# Patient Record
Sex: MALE | Race: BLACK OR AFRICAN AMERICAN | Employment: FULL TIME | URBAN - METROPOLITAN AREA
[De-identification: names, ages, dates, MRNs, and addresses within clinical notes are randomized per-mention and may not be internally consistent; named-entity substitution may affect disease eponyms.]

---

## 2017-02-03 RX ORDER — DILTIAZEM HYDROCHLORIDE 180 MG/1
CAPSULE, COATED, EXTENDED RELEASE ORAL
Qty: 90 CAP | Refills: 1 | Status: SHIPPED | OUTPATIENT
Start: 2017-02-03 | End: 2017-02-08 | Stop reason: SDUPTHER

## 2017-02-08 ENCOUNTER — TELEPHONE (OUTPATIENT)
Dept: INTERNAL MEDICINE CLINIC | Age: 67
End: 2017-02-08

## 2017-02-08 ENCOUNTER — HOSPITAL ENCOUNTER (INPATIENT)
Age: 67
LOS: 2 days | Discharge: HOME | DRG: 378 | End: 2017-02-11
Attending: EMERGENCY MEDICINE | Admitting: HOSPITALIST
Payer: COMMERCIAL

## 2017-02-08 DIAGNOSIS — K92.2 UPPER GI BLEED: Primary | ICD-10-CM

## 2017-02-08 DIAGNOSIS — K92.2 LOWER GI BLEED: ICD-10-CM

## 2017-02-08 LAB
ALBUMIN SERPL BCP-MCNC: 3.7 G/DL (ref 3.5–5)
ALBUMIN/GLOB SERPL: 0.9 {RATIO} (ref 1.1–2.2)
ALP SERPL-CCNC: 43 U/L (ref 45–117)
ALT SERPL-CCNC: 61 U/L (ref 12–78)
ANION GAP BLD CALC-SCNC: 6 MMOL/L (ref 5–15)
APTT PPP: 28.1 SEC (ref 22.1–32.5)
AST SERPL W P-5'-P-CCNC: 72 U/L (ref 15–37)
ATRIAL RATE: 112 BPM
BASOPHILS # BLD AUTO: 0 K/UL (ref 0–0.1)
BASOPHILS # BLD: 0 % (ref 0–1)
BILIRUB SERPL-MCNC: 0.5 MG/DL (ref 0.2–1)
BUN SERPL-MCNC: 26 MG/DL (ref 6–20)
BUN/CREAT SERPL: 29 (ref 12–20)
CALCIUM SERPL-MCNC: 9.1 MG/DL (ref 8.5–10.1)
CALCULATED P AXIS, ECG09: 49 DEGREES
CALCULATED R AXIS, ECG10: -21 DEGREES
CALCULATED T AXIS, ECG11: 35 DEGREES
CHLORIDE SERPL-SCNC: 102 MMOL/L (ref 97–108)
CO2 SERPL-SCNC: 30 MMOL/L (ref 21–32)
CREAT SERPL-MCNC: 0.91 MG/DL (ref 0.7–1.3)
DIAGNOSIS, 93000: NORMAL
EOSINOPHIL # BLD: 0.2 K/UL (ref 0–0.4)
EOSINOPHIL NFR BLD: 3 % (ref 0–7)
ERYTHROCYTE [DISTWIDTH] IN BLOOD BY AUTOMATED COUNT: 13 % (ref 11.5–14.5)
GLOBULIN SER CALC-MCNC: 4.2 G/DL (ref 2–4)
GLUCOSE BLD STRIP.AUTO-MCNC: 99 MG/DL (ref 65–100)
GLUCOSE SERPL-MCNC: 90 MG/DL (ref 65–100)
HCT VFR BLD AUTO: 39.9 % (ref 36.6–50.3)
HEMOCCULT STL QL: POSITIVE
HGB BLD-MCNC: 10.8 G/DL (ref 12.1–17)
HGB BLD-MCNC: 13.8 G/DL (ref 12.1–17)
HGB BLD-MCNC: 9.9 G/DL (ref 12.1–17)
INR PPP: 1.1 (ref 0.9–1.1)
LYMPHOCYTES # BLD AUTO: 27 % (ref 12–49)
LYMPHOCYTES # BLD: 1.3 K/UL (ref 0.8–3.5)
MCH RBC QN AUTO: 33.8 PG (ref 26–34)
MCHC RBC AUTO-ENTMCNC: 34.6 G/DL (ref 30–36.5)
MCV RBC AUTO: 97.8 FL (ref 80–99)
MONOCYTES # BLD: 0.6 K/UL (ref 0–1)
MONOCYTES NFR BLD AUTO: 13 % (ref 5–13)
NEUTS SEG # BLD: 2.8 K/UL (ref 1.8–8)
NEUTS SEG NFR BLD AUTO: 57 % (ref 32–75)
P-R INTERVAL, ECG05: 196 MS
PLATELET # BLD AUTO: 189 K/UL (ref 150–400)
POTASSIUM SERPL-SCNC: 4.2 MMOL/L (ref 3.5–5.1)
PROT SERPL-MCNC: 7.9 G/DL (ref 6.4–8.2)
PROTHROMBIN TIME: 11 SEC (ref 9–11.1)
Q-T INTERVAL, ECG07: 306 MS
QRS DURATION, ECG06: 82 MS
QTC CALCULATION (BEZET), ECG08: 417 MS
RBC # BLD AUTO: 4.08 M/UL (ref 4.1–5.7)
SERVICE CMNT-IMP: NORMAL
SODIUM SERPL-SCNC: 138 MMOL/L (ref 136–145)
THERAPEUTIC RANGE,PTTT: NORMAL SECS (ref 58–77)
TROPONIN I SERPL-MCNC: <0.04 NG/ML
VENTRICULAR RATE, ECG03: 112 BPM
WBC # BLD AUTO: 5 K/UL (ref 4.1–11.1)

## 2017-02-08 PROCEDURE — 85025 COMPLETE CBC W/AUTO DIFF WBC: CPT | Performed by: EMERGENCY MEDICINE

## 2017-02-08 PROCEDURE — 94762 N-INVAS EAR/PLS OXIMTRY CONT: CPT

## 2017-02-08 PROCEDURE — C9113 INJ PANTOPRAZOLE SODIUM, VIA: HCPCS | Performed by: EMERGENCY MEDICINE

## 2017-02-08 PROCEDURE — 74011250637 HC RX REV CODE- 250/637: Performed by: HOSPITALIST

## 2017-02-08 PROCEDURE — 74011000250 HC RX REV CODE- 250: Performed by: HOSPITALIST

## 2017-02-08 PROCEDURE — 74011250636 HC RX REV CODE- 250/636: Performed by: INTERNAL MEDICINE

## 2017-02-08 PROCEDURE — 74011250636 HC RX REV CODE- 250/636: Performed by: HOSPITALIST

## 2017-02-08 PROCEDURE — 96366 THER/PROPH/DIAG IV INF ADDON: CPT

## 2017-02-08 PROCEDURE — 99218 HC RM OBSERVATION: CPT

## 2017-02-08 PROCEDURE — 3E0G8GC INTRODUCTION OF OTHER THERAPEUTIC SUBSTANCE INTO UPPER GI, VIA NATURAL OR ARTIFICIAL OPENING ENDOSCOPIC: ICD-10-PCS | Performed by: INTERNAL MEDICINE

## 2017-02-08 PROCEDURE — 74011000258 HC RX REV CODE- 258: Performed by: EMERGENCY MEDICINE

## 2017-02-08 PROCEDURE — 96365 THER/PROPH/DIAG IV INF INIT: CPT

## 2017-02-08 PROCEDURE — 80053 COMPREHEN METABOLIC PANEL: CPT | Performed by: EMERGENCY MEDICINE

## 2017-02-08 PROCEDURE — 84484 ASSAY OF TROPONIN QUANT: CPT | Performed by: EMERGENCY MEDICINE

## 2017-02-08 PROCEDURE — 93005 ELECTROCARDIOGRAM TRACING: CPT

## 2017-02-08 PROCEDURE — 85610 PROTHROMBIN TIME: CPT | Performed by: EMERGENCY MEDICINE

## 2017-02-08 PROCEDURE — 74011000250 HC RX REV CODE- 250: Performed by: EMERGENCY MEDICINE

## 2017-02-08 PROCEDURE — 77030010936 HC CLP LIG BSC -C: Performed by: INTERNAL MEDICINE

## 2017-02-08 PROCEDURE — 82270 OCCULT BLOOD FECES: CPT

## 2017-02-08 PROCEDURE — 85018 HEMOGLOBIN: CPT | Performed by: HOSPITALIST

## 2017-02-08 PROCEDURE — 0W3P8ZZ CONTROL BLEEDING IN GASTROINTESTINAL TRACT, VIA NATURAL OR ARTIFICIAL OPENING ENDOSCOPIC: ICD-10-PCS | Performed by: INTERNAL MEDICINE

## 2017-02-08 PROCEDURE — 77030020268 HC MISC GENERAL SUPPLY: Performed by: INTERNAL MEDICINE

## 2017-02-08 PROCEDURE — 76040000019: Performed by: INTERNAL MEDICINE

## 2017-02-08 PROCEDURE — 96374 THER/PROPH/DIAG INJ IV PUSH: CPT

## 2017-02-08 PROCEDURE — 74011250636 HC RX REV CODE- 250/636: Performed by: EMERGENCY MEDICINE

## 2017-02-08 PROCEDURE — 99285 EMERGENCY DEPT VISIT HI MDM: CPT

## 2017-02-08 PROCEDURE — 82962 GLUCOSE BLOOD TEST: CPT

## 2017-02-08 PROCEDURE — 96361 HYDRATE IV INFUSION ADD-ON: CPT

## 2017-02-08 PROCEDURE — 77030003657 HC NDL SCLER BSC -B: Performed by: INTERNAL MEDICINE

## 2017-02-08 PROCEDURE — 36415 COLL VENOUS BLD VENIPUNCTURE: CPT | Performed by: HOSPITALIST

## 2017-02-08 PROCEDURE — 74011000258 HC RX REV CODE- 258: Performed by: HOSPITALIST

## 2017-02-08 PROCEDURE — 0DC78ZZ EXTIRPATION OF MATTER FROM STOMACH, PYLORUS, VIA NATURAL OR ARTIFICIAL OPENING ENDOSCOPIC: ICD-10-PCS | Performed by: INTERNAL MEDICINE

## 2017-02-08 PROCEDURE — 74011250636 HC RX REV CODE- 250/636: Performed by: NURSE PRACTITIONER

## 2017-02-08 PROCEDURE — 85730 THROMBOPLASTIN TIME PARTIAL: CPT | Performed by: EMERGENCY MEDICINE

## 2017-02-08 RX ORDER — SODIUM CHLORIDE 0.9 % (FLUSH) 0.9 %
5-10 SYRINGE (ML) INJECTION EVERY 8 HOURS
Status: ACTIVE | OUTPATIENT
Start: 2017-02-08 | End: 2017-02-08

## 2017-02-08 RX ORDER — LORAZEPAM 2 MG/ML
2 INJECTION INTRAMUSCULAR
Status: DISCONTINUED | OUTPATIENT
Start: 2017-02-08 | End: 2017-02-11 | Stop reason: HOSPADM

## 2017-02-08 RX ORDER — ERGOCALCIFEROL 1.25 MG/1
50000 CAPSULE ORAL
Status: CANCELLED | OUTPATIENT
Start: 2017-02-08

## 2017-02-08 RX ORDER — SODIUM CHLORIDE 0.9 % (FLUSH) 0.9 %
5-10 SYRINGE (ML) INJECTION AS NEEDED
Status: ACTIVE | OUTPATIENT
Start: 2017-02-08 | End: 2017-02-08

## 2017-02-08 RX ORDER — ONDANSETRON 2 MG/ML
4 INJECTION INTRAMUSCULAR; INTRAVENOUS
Status: DISCONTINUED | OUTPATIENT
Start: 2017-02-08 | End: 2017-02-11 | Stop reason: HOSPADM

## 2017-02-08 RX ORDER — ATROPINE SULFATE 0.1 MG/ML
0.5 INJECTION INTRAVENOUS
Status: DISCONTINUED | OUTPATIENT
Start: 2017-02-08 | End: 2017-02-08 | Stop reason: HOSPADM

## 2017-02-08 RX ORDER — MIDAZOLAM HYDROCHLORIDE 1 MG/ML
.25-1 INJECTION, SOLUTION INTRAMUSCULAR; INTRAVENOUS
Status: DISCONTINUED | OUTPATIENT
Start: 2017-02-08 | End: 2017-02-08 | Stop reason: HOSPADM

## 2017-02-08 RX ORDER — BUPRENORPHINE HYDROCHLORIDE AND NALOXONE HYDROCHLORIDE DIHYDRATE 8; 2 MG/1; MG/1
1.5 TABLET SUBLINGUAL DAILY
Status: DISCONTINUED | OUTPATIENT
Start: 2017-02-08 | End: 2017-02-11 | Stop reason: HOSPADM

## 2017-02-08 RX ORDER — SODIUM CHLORIDE 0.9 % (FLUSH) 0.9 %
5-10 SYRINGE (ML) INJECTION AS NEEDED
Status: DISCONTINUED | OUTPATIENT
Start: 2017-02-08 | End: 2017-02-11 | Stop reason: HOSPADM

## 2017-02-08 RX ORDER — SODIUM CHLORIDE 0.9 % (FLUSH) 0.9 %
5-10 SYRINGE (ML) INJECTION EVERY 8 HOURS
Status: DISCONTINUED | OUTPATIENT
Start: 2017-02-08 | End: 2017-02-08

## 2017-02-08 RX ORDER — SODIUM CHLORIDE 9 MG/ML
50 INJECTION, SOLUTION INTRAVENOUS CONTINUOUS
Status: DISPENSED | OUTPATIENT
Start: 2017-02-08 | End: 2017-02-08

## 2017-02-08 RX ORDER — DEXTROMETHORPHAN/PSEUDOEPHED 2.5-7.5/.8
1.2 DROPS ORAL
Status: DISCONTINUED | OUTPATIENT
Start: 2017-02-08 | End: 2017-02-08 | Stop reason: HOSPADM

## 2017-02-08 RX ORDER — FLUTICASONE PROPIONATE 50 MCG
2 SPRAY, SUSPENSION (ML) NASAL
Status: DISCONTINUED | OUTPATIENT
Start: 2017-02-08 | End: 2017-02-11 | Stop reason: HOSPADM

## 2017-02-08 RX ORDER — EPINEPHRINE 0.1 MG/ML
1 INJECTION INTRACARDIAC; INTRAVENOUS
Status: DISCONTINUED | OUTPATIENT
Start: 2017-02-08 | End: 2017-02-08 | Stop reason: HOSPADM

## 2017-02-08 RX ORDER — DILTIAZEM HYDROCHLORIDE 180 MG/1
180 CAPSULE, COATED, EXTENDED RELEASE ORAL DAILY
Status: DISCONTINUED | OUTPATIENT
Start: 2017-02-08 | End: 2017-02-11 | Stop reason: HOSPADM

## 2017-02-08 RX ORDER — SODIUM CHLORIDE 450 MG/100ML
75 INJECTION, SOLUTION INTRAVENOUS CONTINUOUS
Status: DISCONTINUED | OUTPATIENT
Start: 2017-02-08 | End: 2017-02-11

## 2017-02-08 RX ORDER — FENTANYL CITRATE 50 UG/ML
100 INJECTION, SOLUTION INTRAMUSCULAR; INTRAVENOUS
Status: DISCONTINUED | OUTPATIENT
Start: 2017-02-08 | End: 2017-02-08 | Stop reason: HOSPADM

## 2017-02-08 RX ORDER — FLUMAZENIL 0.1 MG/ML
0.2 INJECTION INTRAVENOUS
Status: DISCONTINUED | OUTPATIENT
Start: 2017-02-08 | End: 2017-02-08 | Stop reason: HOSPADM

## 2017-02-08 RX ORDER — FLUTICASONE PROPIONATE 50 MCG
2 SPRAY, SUSPENSION (ML) NASAL
COMMUNITY
End: 2019-03-18 | Stop reason: SDUPTHER

## 2017-02-08 RX ORDER — DIPHENHYDRAMINE HYDROCHLORIDE 50 MG/ML
50 INJECTION, SOLUTION INTRAMUSCULAR; INTRAVENOUS ONCE
Status: COMPLETED | OUTPATIENT
Start: 2017-02-08 | End: 2017-02-08

## 2017-02-08 RX ORDER — SODIUM CHLORIDE 0.9 % (FLUSH) 0.9 %
10 SYRINGE (ML) INJECTION EVERY 24 HOURS
Status: DISCONTINUED | OUTPATIENT
Start: 2017-02-08 | End: 2017-02-11 | Stop reason: HOSPADM

## 2017-02-08 RX ORDER — NALOXONE HYDROCHLORIDE 0.4 MG/ML
0.4 INJECTION, SOLUTION INTRAMUSCULAR; INTRAVENOUS; SUBCUTANEOUS
Status: DISCONTINUED | OUTPATIENT
Start: 2017-02-08 | End: 2017-02-08 | Stop reason: HOSPADM

## 2017-02-08 RX ADMIN — SODIUM CHLORIDE 8 MG/HR: 900 INJECTION, SOLUTION INTRAVENOUS at 15:18

## 2017-02-08 RX ADMIN — FOLIC ACID: 5 INJECTION, SOLUTION INTRAMUSCULAR; INTRAVENOUS; SUBCUTANEOUS at 13:56

## 2017-02-08 RX ADMIN — EPINEPHRINE 0.4 MG: 0.1 INJECTION INTRACARDIAC; INTRAVENOUS at 17:33

## 2017-02-08 RX ADMIN — SODIUM CHLORIDE 8 MG/HR: 900 INJECTION, SOLUTION INTRAVENOUS at 22:53

## 2017-02-08 RX ADMIN — MIDAZOLAM HYDROCHLORIDE 2 MG: 1 INJECTION, SOLUTION INTRAMUSCULAR; INTRAVENOUS at 17:21

## 2017-02-08 RX ADMIN — MIDAZOLAM HYDROCHLORIDE 1 MG: 1 INJECTION, SOLUTION INTRAMUSCULAR; INTRAVENOUS at 17:24

## 2017-02-08 RX ADMIN — BUPRENORPHINE AND NALOXONE 2 TABLET: 8; 2 TABLET SUBLINGUAL at 11:15

## 2017-02-08 RX ADMIN — ONDANSETRON 4 MG: 2 INJECTION INTRAMUSCULAR; INTRAVENOUS at 15:12

## 2017-02-08 RX ADMIN — SODIUM CHLORIDE 80 MG: 9 INJECTION, SOLUTION INTRAMUSCULAR; INTRAVENOUS; SUBCUTANEOUS at 06:04

## 2017-02-08 RX ADMIN — DILTIAZEM HYDROCHLORIDE 180 MG: 180 CAPSULE, COATED, EXTENDED RELEASE ORAL at 11:18

## 2017-02-08 RX ADMIN — Medication 10 ML: at 15:13

## 2017-02-08 RX ADMIN — SODIUM CHLORIDE 8 MG/HR: 900 INJECTION, SOLUTION INTRAVENOUS at 06:07

## 2017-02-08 RX ADMIN — SODIUM CHLORIDE 1000 ML: 900 INJECTION, SOLUTION INTRAVENOUS at 05:36

## 2017-02-08 RX ADMIN — FENTANYL CITRATE 50 MCG: 50 INJECTION, SOLUTION INTRAMUSCULAR; INTRAVENOUS at 17:21

## 2017-02-08 RX ADMIN — FENTANYL CITRATE 25 MCG: 50 INJECTION, SOLUTION INTRAMUSCULAR; INTRAVENOUS at 17:39

## 2017-02-08 RX ADMIN — MIDAZOLAM HYDROCHLORIDE 1 MG: 1 INJECTION, SOLUTION INTRAMUSCULAR; INTRAVENOUS at 18:34

## 2017-02-08 RX ADMIN — SODIUM CHLORIDE 75 ML/HR: 450 INJECTION, SOLUTION INTRAVENOUS at 10:39

## 2017-02-08 RX ADMIN — DIPHENHYDRAMINE HYDROCHLORIDE 50 MG: 50 INJECTION, SOLUTION INTRAMUSCULAR; INTRAVENOUS at 17:32

## 2017-02-08 RX ADMIN — FENTANYL CITRATE 25 MCG: 50 INJECTION, SOLUTION INTRAMUSCULAR; INTRAVENOUS at 17:25

## 2017-02-08 NOTE — H&P
[address]  174 [facility], [address] [place] [address]          Pre-procedure History and Physical       NAME:  Arturo Mccray. :   1950   MRN:   [id number]     CHIEF COMPLAINT/HPI: See procedure note    PMH:  Past Medical History   Diagnosis Date    Hepatitis C     Hepatitis C 2010    Hypertension     PUD (peptic ulcer disease)     Seizures ([address]      as a child-none since ~       PSH:  Past Surgical History   Procedure Laterality Date    Hx appendectomy      Hx heent       T&A    Hx gi       surgical repair of bleeding ulcer    Hx gi       colon resection    Hx orthopaedic   &      left knee    Hx knee arthroscopy       right knee       Allergies:  No Known Allergies    Home Medications:  Prior to Admission Medications   Prescriptions Last Dose Informant Patient Reported? Taking?   aspirin-acetaminophen-caffeine (EXCEDRIN ES) 250-250-65 mg per tablet   Yes Yes   Sig: Take 1-2 Tabs by mouth every eight (8) hours as needed for Headache.   buprenorphine-naloxone (SUBOXONE) 8-2 mg Subl 2017 at Unknown time  No Yes   Si Tabs by SubLINGual route daily. Patient taking differently: 1.5 Tabs by SubLINGual route daily. dilTIAZem CD (CARDIZEM CD) 180 mg ER capsule 2017 at Unknown time  No Yes   Sig: Take 1 Cap by mouth daily. fluticasone (FLONASE) 50 mcg/actuation nasal spray   Yes Yes   Si Sprays by Both Nostrils route daily as needed for Rhinitis or Allergies. Indications: ALLERGIC RHINITIS   multivitamin (ONE A DAY) tablet 2017 at Unknown time  Yes Yes   Sig: Take 1 Tab by mouth daily.       Facility-Administered Medications: None       Hospital Medications:  Current Facility-Administered Medications   Medication Dose Route Frequency    pantoprazole (PROTONIX) 40 mg in 0.9% sodium chloride (MBP/ADV) 50 mL  8 mg/hr IntraVENous CONTINUOUS    buprenorphine-naloxone (SUBOXONE) 8-2mg SL tablet  2 Tab SubLINGual DAILY    fluticasone (FLONASE) 50 mcg/actuation nasal spray 2 Spray  2 Spray Both Nostrils DAILY PRN    dilTIAZem CD (CARDIZEM CD) capsule 180 mg  180 mg Oral DAILY    sodium chloride (NS) flush 5-10 mL  5-10 mL IntraVENous PRN    0.9% sodium chloride 1,000 mL with mvi, adult no. 4 with vit K 10 mL, thiamine 976 mg, folic acid 1 mg infusion   IntraVENous Q24H    0.45% sodium chloride infusion  75 mL/hr IntraVENous CONTINUOUS    LORazepam (ATIVAN) injection 2 mg  2 mg IntraVENous Q1H PRN    sodium chloride (NS) flush 10 mL  10 mL IntraVENous Q24H    ondansetron (ZOFRAN) injection 4 mg  4 mg IntraVENous Q6H PRN    0.9% sodium chloride infusion  50 mL/hr IntraVENous CONTINUOUS    sodium chloride (NS) flush 5-10 mL  5-10 mL IntraVENous Q8H    sodium chloride (NS) flush 5-10 mL  5-10 mL IntraVENous PRN    midazolam (VERSED) injection 0.25-10 mg  0.25-10 mg IntraVENous Multiple    fentaNYL citrate (PF) injection 100 mcg  100 mcg IntraVENous Multiple    naloxone (NARCAN) injection 0.4 mg  0.4 mg IntraVENous Multiple    flumazenil (ROMAZICON) 0.1 mg/mL injection 0.2 mg  0.2 mg IntraVENous Multiple    benzocaine (HURRICANE) 20 % spray   Mucous Membrane ONCE    simethicone (MYLICON) 18PN/2.3YT oral drops 80 mg  1.2 mL Oral Multiple    diphenhydrAMINE (BENADRYL) injection 50 mg  50 mg IntraVENous ONCE    atropine injection 0.5 mg  0.5 mg IntraVENous ONCE PRN    EPINEPHrine (ADRENALIN) 0.1 mg/mL syringe 1 mg  1 mg Endoscopically ONCE PRN       Family History:  Family History   Problem Relation Age of Onset    Heart Disease Mother     Diabetes Father     Diabetes Sister     Diabetes Paternal Grandmother        Social History:  Social History   Substance Use Topics    Smoking status: Former Smoker    Smokeless tobacco: Never Used      Comment: quit May 2010    Alcohol use 0.5 oz/week     1 Cans of beer per week      Comment: occasional beer.           PHYSICAL EXAM PRIOR TO SEDATION:  General: Alert, in no acute distress Lungs:            CTA bilaterally  Heart:  Normal S1, S2    Abdomen: Soft, Non distended, Non tender. Normoactive bowel sounds. Assessment:   Stable for sedation administration.     Plan:   · Endoscopic procedure with sedation     Signed By: Lalo Monae MD     2/8/2017  5:20 PM

## 2017-02-08 NOTE — ED PROVIDER NOTES
HPI Comments: 71-year-old [facility] American male presents to the emergency department with palpitations and melena. Patient states that over the last 24 hours he has noticed blood in his room and dark stools several times. He also has vomited blood one time. He states that the vomit and the stools have been very dark almost black appearing over the past 12 hours. Patient states he has a history of stomach ulcers. Patient denies abdominal pain. He denies chest pain or shortness of breath, but he does feel palpitations. Patient reports that he feels his heart racing on and off. Patient admits to smoking cigarettes one half pack a day. He also drinks 4 beers per day. Patient is not on blood thinners. The history is provided by the patient. Past Medical History:   Diagnosis Date    Hepatitis C     Hepatitis C 8/27/2010    Hypertension     PUD (peptic ulcer disease)     Seizures ([address]      as a child-none since ~1976       Past Surgical History:   Procedure Laterality Date    Hx appendectomy      Hx heent       T&A    Hx gi       surgical repair of bleeding ulcer    Hx gi       colon resection    Hx orthopaedic  2003 & 2005     left knee    Hx knee arthroscopy  1996     right knee         Family History:   Problem Relation Age of Onset    Heart Disease Mother     Diabetes Father     Diabetes Sister     Diabetes Paternal Grandmother        Social History     Social History    Marital status: SINGLE     Spouse name: N/A    Number of children: N/A    Years of education: N/A     Occupational History    Not on file. Social History Main Topics    Smoking status: Former Smoker    Smokeless tobacco: Never Used      Comment: quit May 2010    Alcohol use 0.5 oz/week     1 Cans of beer per week      Comment: occasional beer.      Drug use: No      Comment: quit 2007    Sexual activity: Not Currently     Other Topics Concern    Not on file     Social History Narrative         ALLERGIES: Review of patient's allergies indicates no known allergies. Review of Systems   Constitutional: Negative for activity change and fever. HENT: Negative for congestion. Eyes: Negative for pain. Respiratory: Positive for chest tightness. Negative for cough and shortness of breath. Cardiovascular: Positive for palpitations. Negative for chest pain and leg swelling. Gastrointestinal: Positive for blood in stool, nausea and vomiting. Negative for abdominal pain. Endocrine: Negative for polyuria. Genitourinary: Negative for flank pain and hematuria. Musculoskeletal: Negative for gait problem, neck pain and neck stiffness. Skin: Negative for color change. Allergic/Immunologic: Negative for immunocompromised state. Neurological: Positive for light-headedness. Negative for speech difficulty and headaches. Hematological: Does not bruise/bleed easily. Psychiatric/Behavioral: Negative for confusion. All other systems reviewed and are negative. Vitals:    02/08/17 0511   BP: (!) 129/92   Pulse: (!) 115   Resp: 16   Temp: 99 °F (37.2 °C)   SpO2: 96%   Weight: 77.1 kg (170 lb)   Height: 5' 10\" (1.778 m)            Physical Exam   Constitutional: He is oriented to person, place, and time. He appears well-developed and well-nourished. No distress. No distress   HENT:   Head: Normocephalic and atraumatic. Right Ear: External ear normal.   Left Ear: External ear normal.   Eyes: EOM are normal. Pupils are equal, round, and reactive to light. Neck: Normal range of motion. Neck supple. No JVD present. No tracheal deviation present. Cardiovascular: Normal rate, regular rhythm and normal heart sounds. Exam reveals no gallop and no friction rub. No murmur heard. HR is 110 and regular   Pulmonary/Chest: Effort normal and breath sounds normal. No stridor. No respiratory distress. He has no wheezes. He has no rales. Abdominal: Soft. Bowel sounds are normal. He exhibits no distension.  There is no tenderness. There is no rebound and no guarding. No abd tenderness in all 4 quadrants   Genitourinary:   Genitourinary Comments: Dark stool, strongly heme positive for blood   Musculoskeletal: Normal range of motion. He exhibits no edema, tenderness or deformity. Neurological: He is alert and oriented to person, place, and time. He has normal reflexes. No cranial nerve deficit. He exhibits normal muscle tone. Coordination normal.   Skin: Skin is warm and dry. No rash noted. He is not diaphoretic. No erythema. Psychiatric: He has a normal mood and affect. His behavior is normal. Judgment and thought content normal.   Nursing note and vitals reviewed. MDM  Number of Diagnoses or Management Options  Diagnosis management comments: Patient has dark stools are heme positive. Type and cross. Will check H&H. Will also start Protonix drip since he is having coffee-ground emesis. Patient will need to be admitted. IVF are running. Amount and/or Complexity of Data Reviewed  Clinical lab tests: ordered and reviewed  Tests in the medicine section of CPT®: ordered and reviewed  Discussion of test results with the performing providers: yes  Decide to obtain previous medical records or to obtain history from someone other than the patient: yes  Obtain history from someone other than the patient: yes  Review and summarize past medical records: yes  Discuss the patient with other providers: yes  Independent visualization of images, tracings, or specimens: yes    Risk of Complications, Morbidity, and/or Mortality  Presenting problems: high  Diagnostic procedures: high  Management options: high      ED Course       Procedures    EKG: Sinus tachycardia, , normal axis, normal intervals, no ST elevations or depressions  Ondina Wolf MD    HGB is 13    Pt is on Protonix drip for upper and lower GI bleeds    Pt is stable at this time.     Admission pending for GI bleed    6:36 AM  CONSULT  I spoke to Dr Mika Servin who will admit

## 2017-02-08 NOTE — PROGRESS NOTES
Admission Medication Reconciliation:    Information obtained from:  Patient/Rx Query    Significant PMH/Disease States:   Past Medical History   Diagnosis Date    Hepatitis C     Hepatitis C 2010    Hypertension     PUD (peptic ulcer disease)     Se[address] [address]      as a child-none since ~     Chief Complaint for this Admission:  Updated PTA meds/reviewed patient's allergies. Allergies:  Review of patient's allergies indicates no known allergies. Prior to Admission Medications:   Prior to Admission Medications   Prescriptions Last Dose Informant Patient Reported? Taking?   aspirin-acetaminophen-caffeine (EXCEDRIN ES) 250-250-65 mg per tablet   Yes Yes   Sig: Take 1-2 Tabs by mouth every eight (8) hours as needed for Headache.   buprenorphine-naloxone (SUBOXONE) 8-2 mg Subl 2017 at Unknown time  No Yes   Si Tabs by SubLINGual route daily. Patient taking differently: 1.5 Tabs by SubLINGual route daily. dilTIAZem CD (CARDIZEM CD) 180 mg ER capsule 2017 at Unknown time  No Yes   Sig: Take 1 Cap by mouth daily. fluticasone (FLONASE) 50 mcg/actuation nasal spray   Yes Yes   Si Sprays by Both Nostrils route daily as needed for Rhinitis or Allergies. Indications: ALLERGIC RHINITIS   multivitamin (ONE A DAY) tablet 2017 at Unknown time  Yes Yes   Sig: Take 1 Tab by mouth daily. Facility-Administered Medications: None     Comments/Recommendations: Updated PTA meds/reviewed patient's allergies.   1)  Suboxone dose is now 1.5 tabs daily (vs 2 tabs)  2)  Removed duplicate diltiazem and viagra (patient no longer takes)  3)  Patient has not yet started ergocalciferol (removed)  4)  Added prn Excedrin for headaches  5)  Confirmed NKDA status

## 2017-02-08 NOTE — PROGRESS NOTES
Bedside and Verbal shift change report given to candice (oncoming nurse) by Lee Ivan Mini nurse). Report included the following information SBAR.

## 2017-02-08 NOTE — PROCEDURES
[address] [place] [address] Sayda Longoria M.D.  174 [facility], [address] [address]  [phone]               Esophagogastroduodenoscopy (EGD) Procedure Note    NAME: Rick Mobley :  1950  MRN:  [id number]    Indications:  Hematemesis; melena    : Berenice Fuentes MD    Referring Provider:  Winnie Whitfield MD    Medicine:  Benadryl 50mg IV; Fentanyl 100 mcg IV, Versed 4 mg IV      Procedure Details:  After informed consent was obtained with all risks and benefits of the procedure explained and preprocedure exam completed, the patient was placed in the left lateral decubitus position. Universal protocol for patient identification was performed and documented in the nursing notes. Throughout the procedure, the patient's blood pressure was monitored at least every five minutes; pulse, and oxygen saturations were monitored continuously. All vital signs were documented in the nursing notes. The endoscope was inserted into the mouth and advanced under direct vision to second portion of the duodenum. A careful inspection was made as the gastroscope was withdrawn, including a retroflexed view of the proximal stomach; findings and interventions are described below. Findings:   Esophagus: normal  Stomach: small hiatal hernia, unable to visualize the fundus and some of the gastric body due to old blood clots which could not be suctioned through the scope, pyloric 14 mm crater ulcer with adherent clot s/p clot removal with 1 mL epinephrine injection at 4 quadrants around the ulcer and placement of 4 hemoclips. Duodenum: normal    Interventions:    injection of 4 cc of epinephrine and 4 hemoclips    Specimens:   * No specimens in log *     EBL: None          Complications:     No immediate complications        Impression:  -Pyloric gastric ulcer with adherent clot s/p epinephrine injection and 4 hemoclips. Likely cause of hematemesis and melena. Recommendations:  -CLD  -PPI gtt  -Hgb q 8 hrs, transfuse PRN  -Check H. Pylori Ab, treat if positive  -Avoid ASA/NSAIDs  -If recurrent active bleeding, STAT IR consultation for possible embolization  -Pt will be admitted-tele floor if possible. Discussed with Dr. Cat Huff. Signed by:  Russ García MD         2/8/2017 6:01 PM

## 2017-02-08 NOTE — ED NOTES
Patient left department with transport for transportation inpatient bed. Patient's VS at the time of transfer were /85, 97% on RA, denies pain at this time, 98.6 orally, HR 91 sinus rhythm on the monitor. Patient was alert and oriented x 4 and denies further needs at time of transfer. .      TRANSFER - OUT REPORT:    Verbal report given to Esthela Ortega RN(name) on [employer].  being transferred to Observation(unit) for routine progression of care       Report consisted of patients Situation, Background, Assessment and   Recommendations(SBAR). Information from the following report(s) SBAR, Kardex, ED Summary, [facility] and Recent Results was reviewed with the receiving nurse. Opportunity for questions and clarification was provided.

## 2017-02-08 NOTE — PROGRESS NOTES
Patient given conscious sedation for EGD with Dr Frank Guerra. TRANSFER - OUT REPORT:    Verbal report given to Pierce LAGUNAS RN(name) on [employer].  being transferred to [facility](unit) for routine post - op       Report consisted of patients Situation, Background, Assessment and   Recommendations(SBAR). Information from the following report(s) SBAR, Procedure Summary, MAR and Recent Results was reviewed with the receiving nurse. Lines:   Peripheral IV 02/08/17 Right Antecubital (Active)   Site Assessment Clean, dry, & intact 2/8/2017  4:55 PM   Phlebitis Assessment 0 2/8/2017  4:55 PM   Infiltration Assessment 0 2/8/2017  4:55 PM   Dressing Status Clean, dry, & intact 2/8/2017  4:55 PM   Dressing Type Tape;Transparent 2/8/2017  4:55 PM   Hub Color/Line Status Pink 2/8/2017  4:55 PM   Action Taken Open ports on tubing capped 2/8/2017  8:52 AM   Alcohol Cap Used Yes 2/8/2017  8:52 AM        Opportunity for questions and clarification was provided.       Patient transported with:   O2 @ 3 liters  Registered Nurse

## 2017-02-08 NOTE — PROGRESS NOTES
CM met with patient to discuss discharge planning. Patient is  and lives in a two story home with his wife. Wifes name is Fatemeh Barnes [phone]. There is 1 step to inside and 13 to bedroom. He does not use any DME equipment. Patient last saw his PCP in Dec. 2016. I called the office and spoke with Jose Atwood to let them know that patient is being admitted to [facility] [place]. He gets his prescriptions from [facility] on [address]  Patient arrived to ER by car and will go home by car when discharged. Per MD patient hashistory of peptic ulcer disease,alcohol abuse, chronic hepatitis C, comes over here because of black stools. Patient does not have an advanced directive and does not wish to see pastoral care at this time. Baljinder Alfaro RN CRM  Care Management Interventions  PCP Verified by CM: Yes (Dr. Anisa Gamble)  Last Visit to PCP: 12/13/16  Mode of Transport at Discharge:  Other (see comment) (Car)  Transition of Care Consult (CM Consult): Discharge Planning  MyChart Signup: No  Discharge Durable Medical Equipment: No  Physical Therapy Consult: No  Occupational Therapy Consult: No  Speech Therapy Consult: No  Current Support Network: Lives with Spouse (2 story home with 1 step to inside and 13 to upstairs.)  Confirm Follow Up Transport: Family  Plan discussed with Pt/Family/Caregiver: Yes (Patient)  Discharge Location  Discharge Placement: Home

## 2017-02-08 NOTE — ED TRIAGE NOTES
Patient presents to the emergency department reporting palpitations and melena. Patient noticed both sometime over the night. Patient has a history of ulcers. Patient denies any lethargy or melena prior to tonight. Patient denies pain. Patient reports having one episode of vomiting and it was the same color as the stool. Patient denies nausea now.

## 2017-02-08 NOTE — H&P
295 [facility], [address]   HISTORY AND PHYSICAL       Name:  Millie Perez   MR#:  [id number]   :  1950   Account #:  [de-identified]        Date of Adm:  2017       ADMITTING ATTENDING: Jimy Loco MD    PRIMARY CARE PHYSICIAN: Ruben Johnson MD    CHIEF COMPLAINT: Black stools, vomiting since yesterday. HISTORY OF PRESENT ILLNESS: This is a 60-year-old [facility]   American male with a past medical history of peptic ulcer disease,   alcohol abuse, chronic hepatitis C, comes over here because of black   stools. He claims that yesterday morning he started having black stools   and since he has a history of peptic ulcer disease he knows what it   meant but anyway he thought of going to his job. After he returned   from his job, he had slight pain in his lower abdomen and then he went   to move his bowels. Again he had black stools. This morning, when he   woke up, again he was having black stools so at that time he decided   to come to the hospital. When he was getting ready to come to the ER,   he vomited once and he saw some clotted blood and whatever he was   trying to eat or drink. He claims that he drinks about 2 to 4 beers per   day and he was not taking any NSAIDs or any blood thinners recently. He also notes that since yesterday he was noting that he was having   some palpitations as well. No chest pain, shortness of breath,   headache or dizziness. REVIEW OF SYSTEMS: Ten review of systems were done; they were   all negative except for above. PAST MEDICAL HISTORY   1. Chronic hepatitis C.   2. Hypertension. 3. Peptic ulcer disease. PAST SURGERIES: Colon resection in , appendectomy, surgical   repair of bleeding ulcer. FAMILY HISTORY: Significant for coronary artery disease. SOCIAL HISTORY: The patient drinks about 2 to 4 beers every day. The patient claims that he is not smoking and does not use drugs as   well.  He used to do IV drugs in the past but now is on Suboxone   treatment. ALLERGIES: THE PATIENT IS NOT ALLERGIC TO ANY   MEDICATIONS. HOME MEDICATIONS   The patient is on the following medications:   1. Suboxone 8/2 mg 1-1/2 tablets sublingually daily. 2. Cardizem- mg p.o. daily. 3. Flonase 50 mcg 2 sprays both nostrils daily. 4. Multivitamin 1 p.o. daily. 5. Viagra 100 mg as needed. PHYSICAL EXAMINATION   VITAL SIGNS: At the time the patient was seen, the patient's vitals   were as follows: Blood pressure 117/85, pulse 91, respiratory rate 16,   saturating 97% on room air. GENERAL: Not in acute distress, comfortable lying in bed. HEENT: Head normocephalic, atraumatic. EYES: PERRLA, EOMI. EARS: Bilateral hearing normal. No growth. NOSE: No polyps, no bleeding. MOUTH: Decent oral hygiene. NECK: Supple, no JVD, no thyromegaly. RESPIRATORY: Clear to auscultation bilaterally. No adventitious   breath sounds. CARDIOVASCULAR: S1, S2 normal. No murmurs, rubs, or gallops. GASTROINTESTINAL: Bowel sounds present. Soft, nontender,   nondistended. NEUROLOGICAL: Cranial nerves 2 through 12 intact. Motor 5/5   bilaterally upper limbs and lower limbs. Sensory normal.   PSYCHIATRIC: Mood and affect appropriate. Alert, awake, oriented   x3. LABORATORY AND RADIOLOGICAL RESULTS: WBC 5.2,   hemoglobin 13.8, hematocrit 39, and a platelet count of 902. INR 1.1,   PT 11.0. Sodium 138, potassium 4.2, chloride 102, bicarbonate 30,   BUN 26, creatinine 0.91. ALT 61, AST 72. EKG was done which shows sinus tachycardia, no ST-T wave changes   suggestive of ischemia. ASSESSMENT AND PLAN: This is a 62-year-old [facility] American   male with a past medical history of peptic ulcer disease who comes   over here with melena, as well as hematemesis. 1. Hematemesis likely secondary to upper GI bleed. Will admit the   patient under observation (the patient has already been informed of the   patient's status). Will put the patient on Protonix and the ER physician   has already spoken to Dr. . I feel that the patient would require an   EGD, but I would leave it up to the gastroenterologist. If and when the   patient is cleared from the GI standpoint, we should be able to   discharge the patient either today or tomorrow. 2. Hypertension. We will continue the patient's home medications. 3. Alcohol abuse. I have counseled the patient extensively. We will put   the patient on CIWA protocol. I spent about 45 minutes in taking care of the patient.         Brenna Lr MD      PG / BA   D:  02/08/2017   08:16   T:  02/08/2017   08:43   Job #:  [id number]

## 2017-02-08 NOTE — CONSULTS
[address] [address]        GASTROENTEROLOGY CONSULTATION NOTE  Arley ScruggsBRAIN  [phone] office  [phone] NP in-hospital cell phone M-F until 4:30  After 5pm or on weekends, please call  for physician on call        NAME:  Staci Kay. :   1950   MRN:   [id number]       Referring Physician: Migdalia Ray NP    Consult Date: 2017 3:30 PM    Chief Complaint: epigastric pain     History of Present Illness:  Patient is a 74 y.o. who is seen in consultation at the request of Migdalia Ray NP for heme positive stool. Pt PMH as below. Pt presented to ER after multiple black bowel movements and epigastric pain. One episode of vomiting prior to arrival (coffee-ground). Pt admits to 3-5 beers/day. Occasional NSAID use. Epigastric pain described as burning. Some improvement since PPI. Had 3 more black stools today. Pt denies any dizziness, dyspnea. Pt colon resection was about , he states from a \"stricture. \" EGD/last GI bleed around . I have reviewed the emergency room note, hospital admission note, notes by all other physicians who have seen the patient during this hospitalization to date. I have reviewed the problem list and the reason for this hospitalization. I have reviewed the allergies and the medications the patient was taking at home prior to this hospitalization.     PMH:  Past Medical History   Diagnosis Date    Hepatitis C     Hepatitis C 2010    Hypertension     PUD (peptic ulcer disease)     Seizures ([facility] [address]      as a child-none since ~       PSH:  Past Surgical History   Procedure Laterality Date    Hx appendectomy      Hx heent       T&A    Hx gi       surgical repair of bleeding ulcer    Hx gi       colon resection    Hx orthopaedic   &      left knee    Hx knee arthroscopy       right knee       Allergies:  No Known Allergies    Home Medications:  Prior to Admission Medications   Prescriptions Last Dose Informant Patient Reported? Taking?   aspirin-acetaminophen-caffeine (EXCEDRIN ES) 250-250-65 mg per tablet   Yes Yes   Sig: Take 1-2 Tabs by mouth every eight (8) hours as needed for Headache.   buprenorphine-naloxone (SUBOXONE) 8-2 mg Subl 2017 at Unknown time  No Yes   Si Tabs by SubLINGual route daily. Patient taking differently: 1.5 Tabs by SubLINGual route daily. dilTIAZem CD (CARDIZEM CD) 180 mg ER capsule 2017 at Unknown time  No Yes   Sig: Take 1 Cap by mouth daily. fluticasone (FLONASE) 50 mcg/actuation nasal spray   Yes Yes   Si Sprays by Both Nostrils route daily as needed for Rhinitis or Allergies. Indications: ALLERGIC RHINITIS   multivitamin (ONE A DAY) tablet 2017 at Unknown time  Yes Yes   Sig: Take 1 Tab by mouth daily. Facility-Administered Medications: None       Hospital Medications:  Current Facility-Administered Medications   Medication Dose Route Frequency    pantoprazole (PROTONIX) 40 mg in 0.9% sodium chloride (MBP/ADV) 50 mL  8 mg/hr IntraVENous CONTINUOUS    buprenorphine-naloxone (SUBOXONE) 8-2mg SL tablet  2 Tab SubLINGual DAILY    fluticasone (FLONASE) 50 mcg/actuation nasal spray 2 Spray  2 Spray Both Nostrils DAILY PRN    dilTIAZem CD (CARDIZEM CD) capsule 180 mg  180 mg Oral DAILY    sodium chloride (NS) flush 5-10 mL  5-10 mL IntraVENous PRN    0.9% sodium chloride 1,000 mL with mvi, adult no. 4 with vit K 10 mL, thiamine 956 mg, folic acid 1 mg infusion   IntraVENous Q24H    0.45% sodium chloride infusion  75 mL/hr IntraVENous CONTINUOUS    LORazepam (ATIVAN) injection 2 mg  2 mg IntraVENous Q1H PRN    sodium chloride (NS) flush 10 mL  10 mL IntraVENous Q24H    ondansetron (ZOFRAN) injection 4 mg  4 mg IntraVENous Q6H PRN       Social History:  Social History   Substance Use Topics    Smoking status: Former Smoker    Smokeless tobacco: Never Used      Comment: quit May 2010    Alcohol use 0.5 oz/week 1 Cans of beer per week      Comment: occasional beer.         Family History:  Family History   Problem Relation Age of Onset    Heart Disease Mother     Diabetes Father     Diabetes Sister     Diabetes Paternal Grandmother        Review of Systems:  Constitutional: negative fever, negative chills, negative weight loss  Eyes:   negative visual changes  ENT:   negative sore throat, tongue or lip swelling  Respiratory:  negative cough, negative dyspnea  Cards:  + chest \"fullness\", negative palpitations, lower extremity edema  GI:   See HPI  :  negative for frequency, dysuria  Integument:  negative for rash and pruritus  Heme:  negative for easy bruising and gum/nose bleeding  Musculoskel: negative for myalgias, back pain and muscle weakness  Neuro: negative for headaches, dizziness, vertigo  Psych:  negative for feelings of anxiety, depression     Objective:   Patient Vitals for the past 8 hrs:   BP Temp Pulse Resp SpO2   02/08/17 1451 98/71 98.6 °F (37 °C) (!) 104 16 97 %   02/08/17 1159 96/68 - 99 16 100 %   02/08/17 1117 104/71 - (!) 106 16 98 %   02/08/17 0852 100/61 98.9 °F (37.2 °C) 88 16 94 %   02/08/17 0800 117/85 98.6 °F (37 °C) 91 16 97 %     02/08 0701 - 02/08 1900  In: 27.5 [I.V.:27.5]  Out: -        EXAM:     CONST:  Pleasant male in bed with family members   NEURO:  alert and oriented x 3   HEENT: EOMI, no scleral icterus   LUNGS: clear to ausculation, (-) wheeze   CARD:  regular rate and rhythm, S1 S2   ABD:  soft, no tenderness, no rebound, bowel sounds (+) all 4 quadrants, no masses, non distended   EXT:  no edema, warm   PSYCH: full, not anxious     Data Review     Recent Labs      02/08/17   0534   WBC  5.0   HGB  13.8   HCT  39.9   PLT  189     Recent Labs      02/08/17 0534   NA  138   K  4.2   CL  102   CO2  30   BUN  26*   CREA  0.91   GLU  90   CA  9.1     Recent Labs      02/08/17   0534   SGOT  72*   AP  43*   TP  7.9   ALB  3.7   GLOB  4.2*     Recent Labs      02/08/17   0534 INR  1.1   PTP  11.0   APTT  28.1          Assessment:   · Likely upper GI bleed: black stool, likely hematemesis with hemoccult positive stool. Patient Active Problem List   Diagnosis Code    Stricture of colon ([facility] K56.69    Hypertension I10    Hepatitis C B19.20    Opiate dependence ([facility] F11.20    Cervical pain (neck) M54.2    Vitamin D deficiency E55.9     Plan:   · EGD when spot available  · NPO until decision. If tomorrow, can have clears until midnight. · Agree with BID PPI  · Trend H/H  · Transfuse as necessary  · Thank you for allowing me to participate in care of [employer]. Signed By: Frederick Lee. Maria D Box NP     2/8/2017  3:30 PM       GI attending note:    Gen: NAD; Cardiac: nml S1, S2; Pulm: CTA B; Abd: normoactive BS, nt, nd, no rebound/guarding. Vitals, labs, and imaging reviewed. Agree with the history, physical, and plan of the NP. EGD today to evaluate for PUD. Thank you for this consultation.     Dr. Israel Cadena

## 2017-02-08 NOTE — Clinical Note
Patient Class[de-identified] Observation [319] Type of Bed: Clinical Observation Unit [36] Reason for Observation: Gi bleed Admitting Physician: Marcelino Alatorre [[id number]] Attending Physician: Farooq Tucker Diagnosis: GI bleed

## 2017-02-08 NOTE — IP AVS SNAPSHOT
[address] 
[phone] Patient: Sherin Castaneda. MRN: [id number] You are allergic to the following No active allergies Recent Documentation Height Weight BMI Smoking Status 1.778 m 77.1 kg 24.39 kg/m2 Former Smoker Unresulted Labs Order Current Status SAMPLE TO BLOOD BANK In process Emergency Contacts Name Discharge Info Relation Home Work Mobile Sasha Larson  Other Relative [6] [phone] About your hospitalization You were admitted on:  February 8, 2017 You last received care in the:  [facility] 5 OBSERVATION You were discharged on:  February 11, 2017 Unit phone number:  [phone] Why you were hospitalized Your primary diagnosis was:  Not on File Your diagnoses also included:  Peptic Ulcer With Hemorrhage Providers Seen During Your Hospitalizations Provider Role Specialty Primary office phone Aria Levy MD Attending Provider Emergency Medicine [phone] Isaias Cummings MD Attending Provider Internal Medicine [phone] Chai Whitmore MD Attending Provider Internal Medicine [phone] Your Primary Care Physician (PCP) Primary Care Physician Office Phone Office Fax [address] [phone] Follow-up Information Follow up With Details Comments Contact Info Tania Clark MD In 3 days Hospital follow up [address] 
[phone] Jakie Severe., MD  Call to schedule a follow up in 2-3 weeks  [address] 
[phone] Current Discharge Medication List  
  
START taking these medications Dose & Instructions Dispensing Information Comments Morning Noon Evening Bedtime  
 bismuth subsalicylate 543 mg Tab Your next dose is: Today, Tomorrow Other:  _________ Dose:  2 Tab Take 2 Tabs by mouth four (4) times daily for 10 days. Quantity:  80 Tab Refills:  0  
     
   
   
   
  
 doxycycline 100 mg tablet Commonly known as:  ADOXA Your next dose is: Today, Tomorrow Other:  _________ Dose:  100 mg Take 1 Tab by mouth two (2) times a day for 10 days. Indications: h pyolri Quantity:  20 Tab Refills:  0  
     
   
   
   
  
 metroNIDAZOLE 500 mg tablet Commonly known as:  FLAGYL Your next dose is: Today, Tomorrow Other:  _________ Dose:  500 mg Take 1 Tab by mouth three (3) times daily for 10 days. Indications: HELICOBACTER PYLORI GASTRITIS Quantity:  30 Tab Refills:  0  
     
   
   
   
  
 pantoprazole 40 mg tablet Commonly known as:  PROTONIX Your next dose is: Today, Tomorrow Other:  _________ Dose:  40 mg Take 1 Tab by mouth Before breakfast and dinner. Script on chart from GI 40 mg twice daily for a total of 10 days then decrease to 40 mg daily Quantity:  60 Tab Refills:  0  
     
   
   
   
  
 sucralfate 100 mg/mL suspension Commonly known as:  [place] Bach Your next dose is: Today, Tomorrow Other:  _________ Dose:  1 g Take 10 mL by mouth Before breakfast, lunch, dinner and at bedtime for 14 days. Indications: Duodenal Ulcer Quantity:  560 mL Refills:  0 CONTINUE these medications which have CHANGED Dose & Instructions Dispensing Information Comments Morning Noon Evening Bedtime  
 buprenorphine-naloxone 8-2 mg Subl Commonly known as:  SUBOXONE What changed:  how much to take Your next dose is: Today, Tomorrow Other:  _________ Dose:  2 Tab  
2 Tabs by SubLINGual route daily. Quantity:  60 Tab Refills:  2  
     
   
   
   
  
 dilTIAZem  mg ER capsule Commonly known as:  CARDIZEM CD What changed:  Another medication with the same name was removed. Continue taking this medication, and follow the directions you see here. Your next dose is: Today, Tomorrow Other:  _________ Dose:  180 mg Take 1 Cap by mouth daily. Quantity:  90 Cap Refills:  1 FLONASE 50 mcg/actuation nasal spray Generic drug:  fluticasone What changed:  Another medication with the same name was removed. Continue taking this medication, and follow the directions you see here. Your next dose is: Today, Tomorrow Other:  _________ Dose:  2 Spray 2 Sprays by Both Nostrils route daily as needed for Rhinitis or Allergies. Indications: ALLERGIC RHINITIS Refills:  0 CONTINUE these medications which have NOT CHANGED Dose & Instructions Dispensing Information Comments Morning Noon Evening Bedtime  
 aspirin-acetaminophen-caffeine 250-250-65 mg per tablet Commonly known as:  EXCEDRIN ES Your next dose is: Today, Tomorrow Other:  _________ Dose:  1-2 Tab Take 1-2 Tabs by mouth every eight (8) hours as needed for Headache. Refills:  0  
     
   
   
   
  
 multivitamin tablet Commonly known as:  ONE A DAY Your next dose is: Today, Tomorrow Other:  _________ Dose:  1 Tab Take 1 Tab by mouth daily. Refills:  0 Where to Get Your Medications Information on where to get these meds will be given to you by the nurse or doctor. ! Ask your nurse or doctor about these medications  
  bismuth subsalicylate 140 mg Tab  
 doxycycline 100 mg tablet  
 metroNIDAZOLE 500 mg tablet  
 pantoprazole 40 mg tablet  
 sucralfate 100 mg/mL suspension Discharge Instructions Discharge Instructions PATIENT ID: Arturo Mccray. MRN: [id number] YOB: 1950 DATE OF ADMISSION: 2/8/2017  5:13 AM   
DATE OF DISCHARGE: 2/11/2017 PRIMARY CARE PROVIDER: Osiel Ayoub MD  
 
ATTENDING PHYSICIAN: Laura Vaz* DISCHARGING PROVIDER: Geni Deluca NP To contact this individual call [phone] and ask the  to page. If unavailable ask to be transferred the Adult Hospitalist Department. DISCHARGE DIAGNOSES Gi bleed, Gastric Ulcer CONSULTATIONS: IP CONSULT TO GASTROENTEROLOGY 
IP CONSULT TO GASTROENTEROLOGY 
IP CONSULT TO HOSPITALIST 
 
PROCEDURES/SURGERIES: Procedure(s): ESOPHAGOGASTRODUODENOSCOPY (EGD) RESOLUTION CLIP INJECTION PENDING TEST RESULTS:  
At the time of discharge the following test results are still pending: none FOLLOW UP APPOINTMENTS:  
Follow-up Information Follow up With Details Comments Contact Info Osiel Ayoub MD In 3 days Hospital follow up [address] [place] [address] 
[phone] Gissel Owen MD  Call to schedule a follow up in 2-3 weeks  [address] [place] [address] [address] [facility] [phone] ADDITIONAL CARE RECOMMENDATIONS:  
1. We have prescribed you the following new medications: 
-Carafate and Protonix for your stomach ulcer. See below for more information to include common side effects.  
-Flagyl, Doxycycline, and Bismuth for the H.Pylori infection. See below for more information to include common side effects. 2. Make sure to call to schedule a follow up with your GI doctor Dr Gatito Ayala in 2-3 weeks. You will need to follow up with him as you will need a repeat EGD 3. Avoid alcohol use. 4. Avoid NSAID medications such as Ibuprofen, Aspirin, Aleve, Naproxen. 5. Follow up with your pcp on Monday to have your labs rechecked (hemoglobin). 6. If you have any blood in your stool or increase in the darkness of your stool please return to the ER. DIET: Gi lite diet ACTIVITY: as tolerated WOUND CARE: none EQUIPMENT needed: none DISCHARGE MEDICATIONS: 
Doxycycline (By mouth) Doxycycline (cfw-d-XYK-kleen) Treats and prevents infections. Also used to prevent malaria and treat rosacea or severe acne. This medicine is a tetracycline antibiotic. Brand Name(s):Acticlate, Adoxa, Adoxa Lucas 1/150, Avidoxy, Avidoxy DK, BenzoDox 30 Kit, BenzoDox 60 Kit, Doryx, Monodox, Morgidox [id number], Morgidox [id number] Kit, Morgidox [id number], Morgidox 8x755GF Kit, NutriDox Convenience, [address] There may be other brand names for this medicine. When This Medicine Should Not Be Used: This medicine is not right for everyone. Do not use it if you had an allergic reaction to doxycycline or another tetracycline antibiotic, or if you are pregnant or breastfeeding. How to Use This Medicine:  
Capsule, Delayed Release Capsule, Long Acting Capsule, Liquid, Tablet, Delayed Release Tablet · Your doctor will tell you how much medicine to use. Do not use more than directed. · Ask your pharmacist or doctor if you need to take this medicine with or without food. Some forms can be taken with food or milk, but others must be taken on an empty stomach. · Tablets: You may take this medicine with food or milk to avoid stomach irritation. To break a tablet, hold the tablet between your thumb and index fingers close to the appropriate scored line. Then, apply enough pressure to snap the tablet segments apart. Do not use the tablet if it does not break on the scored lines. · Delayed-release tablets: You may also take this medicine by sprinkling the broken tablets onto room-temperature applesauce. Swallow this mixture right away; do not chew. Do not store the mixture for later use. · Oracea® capsules: This medicine must be taken on an empty stomach, at least 1 hour before or 2 hours after a meal. 
· Capsule: Swallow whole. Do not break, crush, chew, or open it. · Oral liquid: Shake the bottle well just before each use.  Measure the oral liquid medicine with a marked measuring spoon, oral syringe, or medicine cup. · Take all of the medicine in your prescription to clear up your infection, even if you feel better after the first few doses. · Drink plenty of fluids to avoid throat problems, if you take the capsule or tablet form. · Malaria prevention: Start taking the medicine 1 or 2 days before you travel. Take the medicine every day during your trip. Keep taking it for 4 weeks after you return. However, do not use the medicine for longer than 4 months. · Do not use this medicine for more than 9 months if you are using it for rosacea. · Use only the brand of medicine your doctor prescribed. Other brands may not work the same way. · Read and follow the patient instructions that come with this medicine. Talk to your doctor or pharmacist if you have any questions. · Missed dose: Take a dose as soon as you remember. If it is almost time for your next dose, wait until then and take a regular dose. Do not take extra medicine to make up for a missed dose. · Store the medicine in a closed container at room temperature, away from heat, moisture, and direct light. Do not freeze the oral liquid. Drugs and Foods to Avoid: Ask your doctor or pharmacist before using any other medicine, including over-the-counter medicines, vitamins, and herbal products. · Some foods and medicines can affect how doxycycline works. Tell your doctor if you are using any of the following: ¨ Bismuth subsalicylate, isotretinoin or other acne medicines, acitretin or other medicine to treat psoriasis ¨ Penicillin antibiotic, birth control pills, medicine for seizures (such as carbamazepine, phenobarbital, phenytoin), stomach medicine, a blood thinner (such as warfarin), or any medicine that contains aluminum, calcium, or iron (such an antacid or vitamin supplement) Warnings While Using This Medicine: · This medicine may cause birth defects if either partner is using it during conception or pregnancy. Tell your doctor right away if you or your partner becomes pregnant. Birth control pills may not work as well when used with this medicine. Use a second form of birth control to keep from getting pregnant. · Tell your doctor if you have kidney disease, liver disease, asthma, or an allergy to sulfites. Tell your doctor if you had surgery on your stomach, or if you have a history of yeast infections. · This medicine may cause the following problems: ¨ Permanent change in tooth color (in children younger than 6years old) ¨ Increased pressure inside the head ¨ Yeast infection ¨ Immune system problems · This medicine can cause diarrhea. Call your doctor if the diarrhea becomes severe, does not stop, or is bloody. Do not take any medicine to stop diarrhea until you have talked to your doctor. Diarrhea can occur 2 months or more after you stop taking this medicine. · This medicine may make your skin more sensitive to sunlight. Wear sunscreen. Do not use sunlamps or tanning beds. · Tell any doctor or dentist who treats you that you are using this medicine. This medicine may affect certain medical test results. · Call your doctor if your symptoms do not improve or if they get worse. · Keep all medicine out of the reach of children. Never share your medicine with anyone. Possible Side Effects While Using This Medicine:  
Call your doctor right away if you notice any of these side effects: · Allergic reaction: Itching or hives, swelling in your face or hands, swelling or tingling in your mouth or throat, chest tightness, trouble breathing · Blistering, peeling, red skin rash · Burning, pain, or irritation in your upper stomach or throat · Diarrhea that may contain blood · Fever, chills, cough, runny or stuffy nose, sore throat, and body aches · Joint pain, fever, rash, and unusual tiredness or weakness · Severe headache, dizziness, or vision changes If you notice these less serious side effects, talk with your doctor: · Darkening of your skin, scars, teeth, or gums · Sores or white patches on your lips, mouth, or throat If you notice other side effects that you think are caused by this medicine, tell your doctor. Call your doctor for medical advice about side effects. You may report side effects to FDA at 7-364-SCR-7661 © 2016 [address] is for End User's use only and may not be sold, redistributed or otherwise used for commercial purposes. The above information is an  only. It is not intended as medical advice for individual conditions or treatments. Talk to your doctor, nurse or pharmacist before following any medical regimen to see if it is safe and effective for you. Metronidazole (By mouth) Metronidazole (met-esmer-LOIDA-da-zole) Treats bacterial infections. Brand Name(s):Flagyl, Flagyl 375, Flagyl ER There may be other brand names for this medicine. When This Medicine Should Not Be Used: This medicine is not right for everyone. Do not use if you had an allergic reaction to metronidazole or similar medicines. Do not use this medicine to treat trichomoniasis if you are in the first 3 months of pregnancy. How to Use This Medicine:  
Capsule, Tablet, Long Acting Tablet · Take this medicine as directed, and take it at the same time each day. · Capsule or Tablet: Take with food or milk to avoid stomach upset. · Extended-release tablet: Take it on an empty stomach, 1 hour before or 2 hours after a meal. 
· Swallow the extended-release tablet whole. Do not crush, break, or chew it. · Take all of the medicine in your prescription to clear up your infection, even if you feel better after the first few doses. · Missed dose: Take a dose as soon as you remember. If it is almost time for your next dose, wait until then and take a regular dose.  Do not take extra medicine to make up for a missed dose. · Store the medicine in a closed container at room temperature, away from heat, moisture, and direct light. Drugs and Foods to Avoid: Ask your doctor or pharmacist before using any other medicine, including over-the-counter medicines, vitamins, and herbal products. · Do not use this medicine if you have taken disulfiram within the last 2 weeks. Do not drink alcohol or use medicine that contains alcohol or propylene glycol while using this medicine and for at least 3 days after you have finished metronidazole treatment. · Some foods and medicines can affect how metronidazole works. Tell your doctor if you are using busulfan, cimetidine, lithium, phenobarbital, phenytoin, or warfarin or another blood thinner. Warnings While Using This Medicine: · Tell your doctor if you are pregnant or breastfeeding, or if you have kidney disease, liver disease, blood or bone marrow problems, oral thrush, yeast infection, or a history of seizures. · This medicine may cause the following problems: 
¨ Brain or nervous system problems, including seizures, meningitis, or vision problems · Trichomoniasis treatment:  Your doctor may want to also treat your sexual partner, even if he or she has no symptoms. Also, you may want to use a condom during sexual intercourse. These measures will help keep you from getting the infection back again from your partner. If you have any questions, ask your doctor. · Call your doctor if your symptoms do not improve or if they get worse. · Your doctor will do lab tests at regular visits to check on the effects of this medicine. Keep all appointments. · Tell any doctor or dentist who treats you that you are using this medicine. This medicine may affect certain medical test results. · Keep all medicine out of the reach of children. Never share your medicine with anyone. Possible Side Effects While Using This Medicine: Call your doctor right away if you notice any of these side effects: · Allergic reaction: Itching or hives, swelling in your face or hands, swelling or tingling in your mouth or throat, chest tightness, trouble breathing · Confusion, drowsiness, fever, headache, loss of appetite, nausea or vomiting, stiff neck or back · Dizziness, problems with muscle control, clumsiness, shakiness, trouble talking · Fever, chills, cough, sore throat, and body aches · Numbness, tingling, or burning pain in your hands, arms, legs, or feet · Seizures If you notice these less serious side effects, talk with your doctor: · Mild nausea · Unusual or unpleasant taste in your mouth If you notice other side effects that you think are caused by this medicine, tell your doctor. Call your doctor for medical advice about side effects. You may report side effects to FDA at 6-996-FDA-9881 © 2016 [address] is for End User's use only and may not be sold, redistributed or otherwise used for commercial purposes. The above information is an  only. It is not intended as medical advice for individual conditions or treatments. Talk to your doctor, nurse or pharmacist before following any medical regimen to see if it is safe and effective for you. Pantoprazole (By mouth) Pantoprazole (pan-TOE-pra-zole) Treats gastroesophageal reflux disease (GERD), a damaged esophagus, and high levels of stomach acid. This medicine is a proton pump inhibitor (PPI). Brand Name(s):Protonix There may be other brand names for this medicine. When This Medicine Should Not Be Used: This medicine is not right for everyone. Do not use it if you had an allergic reaction to pantoprazole. How to Use This Medicine:  
Packet, Tablet, Delayed Release Tablet, Long Acting Tablet · Your doctor will tell you how much medicine to use. Do not use more than directed.  Take the medicine at least 30 minutes before a meal. 
 · Delayed-release tablet: Swallow the tablet whole. Do not crush, break, or chew it. · Delayed-release packet: ¨ To prepare with applesauce: § Mix the packet contents with 1 teaspoon of applesauce. Do not mix with water, or other liquids or food. Do not divide the packet contents to make smaller doses. § Swallow the mixture within 10 minutes after you mix it. Do not chew or crush the granules. § Sip some water after you take the mixture to make sure you swallow all of the medicine. ¨ To prepare with apple juice: § Mix the packet contents with 1 teaspoon of apple juice in a small cup. Do not divide the packet contents to make smaller doses. § Stir for 5 seconds and drink the mixture immediately. Do not chew or crush the granules. § To make sure you get all of the medicine, add more apple juice to the cup. Drink it immediately. ¨ To prepare for a feeding tube: § Pour the packet contents in a 2-ounce (60 milliliter [mL]) catheter-tip syringe. § Add 10 mL of apple juice to the syringe. Add the mixture to the tube. Gently tap or shake the barrel of the syringe to help empty it. § Add 10 mL of apple juice to the syringe and put it in the tube. Do this at least 2 times. There should be no granules left in the syringe. · This medicine should come with a Medication Guide. Ask your pharmacist for a copy if you do not have one. · Missed dose: Take a dose as soon as you remember. If it is almost time for your next dose, wait until then and take a regular dose. Do not take extra medicine to make up for a missed dose. · Store the medicine in a closed container at room temperature, away from heat, moisture, and direct light. Drugs and Foods to Avoid: Ask your doctor or pharmacist before using any other medicine, including over-the-counter medicines, vitamins, and herbal products. · Some medicines can affect how pantoprazole works. Tell your doctor if you are using any of the following: ¨ Ampicillin, atazanavir, digoxin, erlotinib, ketoconazole, methotrexate, mycophenolate mofetil, or nelfinavir ¨ Blood thinner (such as warfarin) ¨ Diuretic (water pill) ¨ Iron supplements Warnings While Using This Medicine: · Tell your doctor if you are pregnant or breastfeeding, or if you have liver disease or osteoporosis. · This medicine may cause the following problems: ¨ Kidney problems ¨ Low vitamin B12 or magnesium levels ¨ Increased risk of broken bones in the hip, wrist, or spine · This medicine can cause diarrhea. Call your doctor if the diarrhea becomes severe, does not stop, or is bloody. Do not take any medicine to stop diarrhea until you have talked to your doctor. Diarrhea can occur 2 months or more after you stop taking this medicine. · Tell any doctor or dentist who treats you that you are using this medicine. This medicine may affect certain medical test results. · Your doctor will check your progress and the effects of this medicine at regular visits. Keep all appointments. · Keep all medicine out of the reach of children. Never share your medicine with anyone. Possible Side Effects While Using This Medicine:  
Call your doctor right away if you notice any of these side effects: · Allergic reaction: Itching or hives, swelling in your face or hands, swelling or tingling in your mouth or throat, chest tightness, trouble breathing · Blistering, peeling, red skin rash · Fever, joint pain, swelling in your body, unusual weight gain, change in how much or how often you urinate · Seizures, dizziness, uneven heartbeat, muscle cramps or twitching · Severe diarrhea, stomach cramps, fever · Stomach pain, nausea, vomiting, weight loss If you notice these less serious side effects, talk with your doctor: · Mild diarrhea If you notice other side effects that you think are caused by this medicine, tell your doctor. Call your doctor for medical advice about side effects.  You may report side effects to FDA at 4-704-FDA-0326 © 2016 [address] is for End User's use only and may not be sold, redistributed or otherwise used for commercial purposes. The above information is an  only. It is not intended as medical advice for individual conditions or treatments. Talk to your doctor, nurse or pharmacist before following any medical regimen to see if it is safe and effective for you. Sucralfate (By mouth) Sucralfate (zdw-IUZL-nagj) Treats ulcers. Brand Name(s):Carafate There may be other brand names for this medicine. When This Medicine Should Not Be Used: You should not use this medicine if you have had an allergic reaction to it. How to Use This Medicine:  
Tablet, Liquid · Your doctor will tell you how much to take and how often. · Do not stop taking the medicine unless your doctor tells you to, even if you feel better. · Take your medicine on an empty stomach. Swallow the tablet with a glass of water. · Unless your doctor tells you otherwise, take the medicine 1 hour before meals and at bedtime. · Measure the oral liquid medicine using a measuring spoon or medicine cup. · Shake the oral liquid medicine well before using. If a dose is missed: · Take your medicine as soon as you remember that you missed your dose. · If it is nearly time for your next dose, wait until then to take your medicine and skip the missed dose. · You should not use two doses at one time. How to Store and Dispose of This Medicine:  
· Keep the medicine at room temperature, away from heat, light, and moisture. Do not freeze the oral liquid. · Keep all medicine out of the reach of children. Drugs and Foods to Avoid: Ask your doctor or pharmacist before using any other medicine, including over-the-counter medicines, vitamins, and herbal products. · Antacids may be taken one-half hour before or after taking sucralfate. · You should not take other medicines within 2 hours before or after taking sucralfate. If you need help deciding the best times to take your other medicines, ask your doctor or pharmacist. 
Warnings While Using This Medicine: · If you are pregnant or breastfeeding, talk to your doctor before taking this medicine. · Check with your doctor before taking if you have kidney problems or are on dialysis. Possible Side Effects While Using This Medicine:  
Call your doctor right away if you notice any of these side effects: 
· Rash, hives, or itching · Swelling of the face or mouth If you notice these less serious side effects, talk with your doctor: · Constipation · Dry mouth · Mild stomach cramps, diarrhea · Upset stomach, gas · Dizziness If you notice other side effects that you think are caused by this medicine, tell your doctor. Call your doctor for medical advice about side effects. You may report side effects to FDA at 5-191-FDA-1512 © 2016 [address] is for End User's use only and may not be sold, redistributed or otherwise used for commercial purposes. The above information is an  only. It is not intended as medical advice for individual conditions or treatments. Talk to your doctor, nurse or pharmacist before following any medical regimen to see if it is safe and effective for you. See Medication Reconciliation Form · It is important that you take the medication exactly as they are prescribed. · Keep your medication in the bottles provided by the pharmacist and keep a list of the medication names, dosages, and times to be taken in your wallet. · Do not take other medications without consulting your doctor. NOTIFY YOUR PHYSICIAN FOR ANY OF THE FOLLOWING:  
Fever over 101 degrees for 24 hours. Chest pain, shortness of breath, fever, chills, nausea, vomiting, diarrhea, change in mentation, falling, weakness, bleeding.  Severe pain or pain not relieved by medications. Or, any other signs or symptoms that you may have questions about. DISPOSITION: 
 X Home With: 
 OT  PT  [place]  RN  
  
 SNF/Inpatient Rehab/LTAC Independent/assisted living Hospice Other: PROBLEM LIST Updated: Yes X Signed:  
Shadia Easton NP 
2/11/2017 
12:01 PM 
 
 
  
Gastrointestinal Bleeding: Care Instructions Your Care Instructions The digestive or gastrointestinal tract goes from the mouth to the anus. It is often called the GI tract. Bleeding can happen anywhere in the GI tract. It may be caused by an ulcer, an infection, or cancer. It may also be caused by medicines such as aspirin or ibuprofen. Light bleeding may not cause any symptoms at first. But if you continue to bleed for a while, you may feel very weak or tired. Sudden, heavy bleeding means you need to see a doctor right away. This kind of bleeding can be very dangerous. But it can usually be cured or controlled. The doctor may do some tests to find the cause of your bleeding. Follow-up care is a key part of your treatment and safety. Be sure to make and go to all appointments, and call your doctor if you are having problems. It's also a good idea to know your test results and keep a list of the medicines you take. How can you care for yourself at home? · Be safe with medicines. Take your medicines exactly as prescribed. Call your doctor if you think you are having a problem with your medicine. You will get more details on the specific medicines your doctor prescribes. · Do not take aspirin or other anti-inflammatory medicines, such as naproxen (Aleve) or ibuprofen (Advil, Motrin), without talking to your doctor first. Ask your doctor if it is okay to use acetaminophen (Tylenol). · Do not drink alcohol. · The bleeding may make you lose iron. So it's important to eat foods that have a lot of iron. These include red meat, shellfish, poultry, and eggs. They also include beans, raisins, whole-grain breads, and leafy green vegetables. If you want help planning meals, you can make an appointment with a dietitian. When should you call for help? Call 911 anytime you think you may need emergency care. For example, call if: 
· You have sudden, severe belly pain. · You vomit blood or what looks like coffee grounds. · You passed out (lost consciousness). · Your stools are maroon or very bloody. Call your doctor now or seek immediate medical care if: 
· You are dizzy or lightheaded, or you feel like you may faint. · Your stools are black and look like tar, or they have streaks of blood. · You have belly pain. · You vomit or have nausea. · You have trouble swallowing, or it hurts when you swallow. Watch closely for changes in your health, and be sure to contact your doctor if: 
· You do not get better as expected. Where can you learn more? Go to http://murali-alcira.info/. Enter H593 in the search box to learn more about \"Gastrointestinal Bleeding: Care Instructions. \" Current as of: May 27, 2016 Content Version: 11.1 © 6601-3828 [employer]. Care instructions adapted under license by [employer] (which disclaims liability or warranty for this information). If you have questions about a medical condition or this instruction, always ask your healthcare professional. [place] [address] any warranty or liability for your use of this information. Discharge Orders None [employer] Announcement We are excited to announce that we are making your provider's discharge notes available to you in Epuramat. You will see these notes when they are completed and signed by the physician that discharged you from your recent hospital stay.   If you have any questions or concerns about any information you see in Epuramat, please call the [place] Department where you were seen or reach out to your Primary Care Provider for more information about your plan of care. Introducing [facility]! [employer] introduces Viewbix patient portal. Now you can access parts of your medical record, email your doctor's office, and request medication refills online. 1. In your internet browser, go to [email or web address] 2. Click on the First Time User? Click Here link in the Sign In box. You will see the New Member Sign Up page. 3. Enter your Viewbix Access Code exactly as it appears below. You will not need to use this code after youve completed the sign-up process. If you do not sign up before the expiration date, you must request a new code. · Viewbix Access Code: [email or web address] Expires: 3/13/2017 12:03 PM 
 
4. Enter the last four digits of your Social Security Number (xxxx) and Date of Birth (mm/dd/yyyy) as indicated and click Submit. You will be taken to the next sign-up page. 5. Create a Viewbix ID. This will be your Viewbix login ID and cannot be changed, so think of one that is secure and easy to remember. 6. Create a Viewbix password. You can change your password at any time. 7. Enter your Password Reset Question and Answer. This can be used at a later time if you forget your password. 8. Enter your e-mail address. You will receive e-mail notification when new information is available in [address] 9. Click Sign Up. You can now view and download portions of your medical record. 10. Click the Download Summary menu link to download a portable copy of your medical information. If you have questions, please visit the Frequently Asked Questions section of the Viewbix website. Remember, Viewbix is NOT to be used for urgent needs. For medical emergencies, dial 911. Now available from your iPhone and Android! General Information Please provide this summary of care documentation to your next provider. Patient Signature:  ____________________________________________________________ Date:  ____________________________________________________________  
  
[facility] Provider Signature:  ____________________________________________________________ Date:  ____________________________________________________________

## 2017-02-09 PROBLEM — K27.4 PEPTIC ULCER WITH HEMORRHAGE: Status: ACTIVE | Noted: 2017-02-09

## 2017-02-09 LAB
ANION GAP BLD CALC-SCNC: 7 MMOL/L (ref 5–15)
BUN SERPL-MCNC: 45 MG/DL (ref 6–20)
BUN/CREAT SERPL: 46 (ref 12–20)
CALCIUM SERPL-MCNC: 8.1 MG/DL (ref 8.5–10.1)
CHLORIDE SERPL-SCNC: 112 MMOL/L (ref 97–108)
CO2 SERPL-SCNC: 27 MMOL/L (ref 21–32)
CREAT SERPL-MCNC: 0.97 MG/DL (ref 0.7–1.3)
ERYTHROCYTE [DISTWIDTH] IN BLOOD BY AUTOMATED COUNT: 13.4 % (ref 11.5–14.5)
ERYTHROCYTE [DISTWIDTH] IN BLOOD BY AUTOMATED COUNT: 13.7 % (ref 11.5–14.5)
GLUCOSE SERPL-MCNC: 103 MG/DL (ref 65–100)
HCT VFR BLD AUTO: 27.9 % (ref 36.6–50.3)
HCT VFR BLD AUTO: 28.7 % (ref 36.6–50.3)
HGB BLD-MCNC: 9.2 G/DL (ref 12.1–17)
HGB BLD-MCNC: 9.3 G/DL (ref 12.1–17)
MCH RBC QN AUTO: 32.5 PG (ref 26–34)
MCH RBC QN AUTO: 32.9 PG (ref 26–34)
MCHC RBC AUTO-ENTMCNC: 32.4 G/DL (ref 30–36.5)
MCHC RBC AUTO-ENTMCNC: 33 G/DL (ref 30–36.5)
MCV RBC AUTO: 100.3 FL (ref 80–99)
MCV RBC AUTO: 99.6 FL (ref 80–99)
PLATELET # BLD AUTO: 148 K/UL (ref 150–400)
PLATELET # BLD AUTO: 154 K/UL (ref 150–400)
POTASSIUM SERPL-SCNC: 4.3 MMOL/L (ref 3.5–5.1)
RBC # BLD AUTO: 2.8 M/UL (ref 4.1–5.7)
RBC # BLD AUTO: 2.86 M/UL (ref 4.1–5.7)
SODIUM SERPL-SCNC: 146 MMOL/L (ref 136–145)
WBC # BLD AUTO: 8.1 K/UL (ref 4.1–11.1)
WBC # BLD AUTO: 8.8 K/UL (ref 4.1–11.1)

## 2017-02-09 PROCEDURE — 74011000258 HC RX REV CODE- 258: Performed by: EMERGENCY MEDICINE

## 2017-02-09 PROCEDURE — 85027 COMPLETE CBC AUTOMATED: CPT | Performed by: HOSPITALIST

## 2017-02-09 PROCEDURE — 74011000258 HC RX REV CODE- 258: Performed by: HOSPITALIST

## 2017-02-09 PROCEDURE — 65270000029 HC RM PRIVATE

## 2017-02-09 PROCEDURE — 74011250636 HC RX REV CODE- 250/636: Performed by: EMERGENCY MEDICINE

## 2017-02-09 PROCEDURE — 99218 HC RM OBSERVATION: CPT

## 2017-02-09 PROCEDURE — 36415 COLL VENOUS BLD VENIPUNCTURE: CPT | Performed by: HOSPITALIST

## 2017-02-09 PROCEDURE — 74011250636 HC RX REV CODE- 250/636: Performed by: HOSPITALIST

## 2017-02-09 PROCEDURE — 74011250637 HC RX REV CODE- 250/637: Performed by: HOSPITALIST

## 2017-02-09 PROCEDURE — C9113 INJ PANTOPRAZOLE SODIUM, VIA: HCPCS | Performed by: EMERGENCY MEDICINE

## 2017-02-09 PROCEDURE — 74011000250 HC RX REV CODE- 250: Performed by: HOSPITALIST

## 2017-02-09 PROCEDURE — 80048 BASIC METABOLIC PNL TOTAL CA: CPT | Performed by: HOSPITALIST

## 2017-02-09 PROCEDURE — 86677 HELICOBACTER PYLORI ANTIBODY: CPT | Performed by: INTERNAL MEDICINE

## 2017-02-09 RX ADMIN — FOLIC ACID: 5 INJECTION, SOLUTION INTRAMUSCULAR; INTRAVENOUS; SUBCUTANEOUS at 12:13

## 2017-02-09 RX ADMIN — SODIUM CHLORIDE 75 ML/HR: 450 INJECTION, SOLUTION INTRAVENOUS at 05:46

## 2017-02-09 RX ADMIN — SODIUM CHLORIDE 8 MG/HR: 900 INJECTION, SOLUTION INTRAVENOUS at 04:03

## 2017-02-09 RX ADMIN — BUPRENORPHINE AND NALOXONE 2 TABLET: 8; 2 TABLET SUBLINGUAL at 10:48

## 2017-02-09 RX ADMIN — SODIUM CHLORIDE 8 MG/HR: 900 INJECTION, SOLUTION INTRAVENOUS at 09:29

## 2017-02-09 RX ADMIN — SODIUM CHLORIDE 8 MG/HR: 900 INJECTION, SOLUTION INTRAVENOUS at 22:03

## 2017-02-09 RX ADMIN — DILTIAZEM HYDROCHLORIDE 180 MG: 180 CAPSULE, COATED, EXTENDED RELEASE ORAL at 10:48

## 2017-02-09 RX ADMIN — SODIUM CHLORIDE 8 MG/HR: 900 INJECTION, SOLUTION INTRAVENOUS at 15:55

## 2017-02-09 NOTE — PROGRESS NOTES
Observation Progress Note  Isidoro Collado, RUY        NAME:  Raelyn Dakins. :  1950  MRN:  [id number]  Date of Service:  2017  8:52 AM    Assessment/Plan:       GI bleed  - s/p EGD with bleeding peptic ulcer  - continue protonix drip  - continue liquid diet  - trend hemoglobin every 8 hours    Hypertension  - home meds    Chronic Hep C  - supportive measures    EtOH abuse  - CIWA protocol if needed, monitor for now    Dispo: continue care in observation, follow up hemoglobins       Subjective:     HPI Summary:  73 yo male with melena and hematemesis. Admitted for GI bleed, scoped by GI with bleeding peptic ulcer that was hemostatic with clips and epinephrine. Review of Systems:  Patient denies head ache, fever, chills, chest pain, shortness of breath, abdominal pain, nausea, vomiting, diarrhea, and dysuria.    + melena overnight      Objective:     VITALS:   VS reviewed since presentation. Most recent are:  Visit Vitals    /71 (BP 1 Location: Right arm, BP Patient Position: At rest)    Pulse 82    Temp 97.7 °F (36.5 °C)    Resp 16    Ht 5' 10\" (1.778 m)    Wt 77.1 kg (170 lb)    SpO2 98%    BMI 24.39 kg/m2       Intake/Output Summary (Last 24 hours) at 17 08  Last data filed at 17 0813   Gross per 24 hour   Intake          2270.17 ml   Output                1 ml   Net          2269.17 ml        PHYSICAL EXAM:  General: No acute distress, cooperative, pleasant   EENT: EOMI. Anicteric sclerae. Oral mucous moist, oropharynx benign  Resp: CTA bilaterally. No wheezing/rhonchi/rales. No accessory muscle use  CV: Regular rhythm, normal rate, no murmurs, gallops, rubs  GI: Soft, non distended, non tender. normoactive bowel sounds,   Extremities: No edema, warm, 2+ pulses throughout  Neurologic: Moves all extremities. AAOx3, CN II-XII grossly intact  Psych: Good insight. Not anxious nor agitated.   Skin: Good Turgor, no rashes or ulcers          Lab Data Personally Reviewed:  x YES  NO     Medications list Personally Reviewed:  x YES  NO     Imaging/Procedures Reviewed:  x YES  NO       _______________________________________________________________________  Care Plan discussed with:  Patient/Family, Nurse and Other MD Milind    Total time spent coordinating care:  20 minutes    Alexys Curiel NP

## 2017-02-09 NOTE — PROGRESS NOTES
[address]  [address] [facility], [address]       GI PROGRESS NOTE  [facility]imi Redd, [facility]  [phone] office  [phone] NP in-hospital cell phone M-F until 4:30  After 5pm or on weekends, please call  for physician on call      NAME: Radha West. :  1950   MRN:  [id number]       Subjective:     Pt states he is still a little groggy. Feels better today. Only small amounts/spots of black on stools. Objective:     VITALS:   Last 24hrs VS reviewed since prior progress note. Most recent are:  Visit Vitals    BP 96/62    Pulse 84    Temp 97.7 °F (36.5 °C)    Resp 16    Ht 5' 10\" (1.778 m)    Wt 77.1 kg (170 lb)    SpO2 97%    BMI 24.39 kg/m2       PHYSICAL EXAM:  General: Cooperative, no acute distress    Neurologic:  Alert and oriented X 3. HEENT: PERRL, EOMI. Lungs:  CTA bilaterally. No wheezing  Heart:  S1 S2, regular rhythm, no murmur   Abdomen: Soft, non distended, non tender. +Bowel sounds  Extremities: No edema  Psych:   Good insight. Not anxious or agitated.     Lab Data Reviewed:     Recent Results (from the past 24 hour(s))   HEMOGLOBIN    Collection Time: 17  3:48 PM   Result Value Ref Range    HGB 10.8 (L) 12.1 - 17.0 g/dL   GLUCOSE, POC    Collection Time: 17  5:40 PM   Result Value Ref Range    Glucose (POC) 99 65 - 100 mg/dL    Performed by Megan Amos    HEMOGLOBIN    Collection Time: 17 10:00 PM   Result Value Ref Range    HGB 9.9 (L) 12.1 - 17.0 g/dL   CBC W/O DIFF    Collection Time: 17  5:49 AM   Result Value Ref Range    WBC 8.8 4.1 - 11.1 K/uL    RBC 2.80 (L) 4.10 - 5.70 M/uL    HGB 9.2 (L) 12.1 - 17.0 g/dL    HCT 27.9 (L) 36.6 - 50.3 %    MCV 99.6 (H) 80.0 - 99.0 FL    MCH 32.9 26.0 - 34.0 PG    MCHC 33.0 30.0 - 36.5 g/dL    RDW 13.4 11.5 - 14.5 %    PLATELET 676 (L) 941 - 325 K/uL   METABOLIC PANEL, BASIC    Collection Time: 17  5:49 AM   Result Value Ref Range    Sodium 146 (H) 136 - 145 mmol/L Potassium 4.3 3.5 - 5.1 mmol/L    Chloride 112 (H) 97 - 108 mmol/L    CO2 27 21 - 32 mmol/L    Anion gap 7 5 - 15 mmol/L    Glucose 103 (H) 65 - 100 mg/dL    BUN 45 (H) 6 - 20 MG/DL    Creatinine 0.97 0.70 - 1.30 MG/DL    BUN/Creatinine ratio 46 (H) 12 - 20      GFR est AA >60 >60 ml/min/1.73m2    GFR est non-AA >60 >60 ml/min/1.73m2    Calcium 8.1 (L) 8.5 - 10.1 MG/DL         ________________________________________________________________________       Assessment:   · Large gastric ulcer: cause of GI bleed. Hgb appears stable. Patient Active Problem List   Diagnosis Code    Stricture of colon ([facility] [address] K56.69    Hypertension I10    Hepatitis C B19.20    Opiate dependence ([facility] [address] F11.20    Cervical pain (neck) M54.2    Vitamin D deficiency E55.9     Plan:   · Advised on alcohol cessation  · Continue PPI drip. Will continue after discharge  · If pt re-bleeds, stat consult IR for possible embolization. · Will need repeat outpt EGD to ensure healing  · Serial H/H  · Transfuse if needed  · Pt advised to let nursing know immediately if it looks or feels like he is bleeding again. Signed By: Odalys Dia. Pili Hayes NP     2/9/2017  11:02 AM         GI attending note:    I personally examined the patient. Agree with the note of the NP.  with stigmata of recent hemorrhage. Hopefully bleeding has stopped. Advanced to FLD. Hgb q12hrs, transfuse PRN. If he continues to do well. Possible discharge tomorrow. H. Pylori pending.      Dr. Vivian Hair

## 2017-02-09 NOTE — PROGRESS NOTES
Patient is resting in bed he is still drowsy from anesthesia  Given for his EGD today. His head of the bed is at 45 degrees and must remain that way throughout the night. He denies having any pain or SOB at this time. 2200 black stool per patient    8265 patient up to bathroom VSS he denies having any pain or SOB at this time. 1944 Patient up to toilet black stool second one tonight. Patient denies having any pain at this time. 0730 Bedside and Verbal shift change report given to Yomaira Martin RN  (oncoming nurse) by Zbigniew Ramírez RN  (offgoing nurse). Report included the following information SBAR, Procedure Summary and MAR.

## 2017-02-10 LAB
ANION GAP BLD CALC-SCNC: 5 MMOL/L (ref 5–15)
BASOPHILS # BLD AUTO: 0 K/UL (ref 0–0.1)
BASOPHILS # BLD: 0 % (ref 0–1)
BUN SERPL-MCNC: 24 MG/DL (ref 6–20)
BUN/CREAT SERPL: 25 (ref 12–20)
CALCIUM SERPL-MCNC: 7.8 MG/DL (ref 8.5–10.1)
CHLORIDE SERPL-SCNC: 108 MMOL/L (ref 97–108)
CO2 SERPL-SCNC: 29 MMOL/L (ref 21–32)
CREAT SERPL-MCNC: 0.96 MG/DL (ref 0.7–1.3)
EOSINOPHIL # BLD: 0.1 K/UL (ref 0–0.4)
EOSINOPHIL NFR BLD: 1 % (ref 0–7)
ERYTHROCYTE [DISTWIDTH] IN BLOOD BY AUTOMATED COUNT: 13.5 % (ref 11.5–14.5)
GLUCOSE SERPL-MCNC: 96 MG/DL (ref 65–100)
H PYLORI IGG SER IA-ACNC: 2.7 U/ML (ref 0–0.8)
H PYLORI IGM SER-ACNC: <9 UNITS (ref 0–8.9)
HCT VFR BLD AUTO: 23.6 % (ref 36.6–50.3)
HCT VFR BLD AUTO: 26 % (ref 36.6–50.3)
HGB BLD-MCNC: 7.9 G/DL (ref 12.1–17)
HGB BLD-MCNC: 8 G/DL (ref 12.1–17)
HGB BLD-MCNC: 8.6 G/DL (ref 12.1–17)
LYMPHOCYTES # BLD AUTO: 31 % (ref 12–49)
LYMPHOCYTES # BLD: 2.5 K/UL (ref 0.8–3.5)
MCH RBC QN AUTO: 33.2 PG (ref 26–34)
MCHC RBC AUTO-ENTMCNC: 33.1 G/DL (ref 30–36.5)
MCV RBC AUTO: 100.4 FL (ref 80–99)
MONOCYTES # BLD: 0.6 K/UL (ref 0–1)
MONOCYTES NFR BLD AUTO: 7 % (ref 5–13)
NEUTS SEG # BLD: 4.9 K/UL (ref 1.8–8)
NEUTS SEG NFR BLD AUTO: 61 % (ref 32–75)
PLATELET # BLD AUTO: 150 K/UL (ref 150–400)
POTASSIUM SERPL-SCNC: 4.4 MMOL/L (ref 3.5–5.1)
RBC # BLD AUTO: 2.59 M/UL (ref 4.1–5.7)
SODIUM SERPL-SCNC: 142 MMOL/L (ref 136–145)
WBC # BLD AUTO: 8.1 K/UL (ref 4.1–11.1)

## 2017-02-10 PROCEDURE — 74011250637 HC RX REV CODE- 250/637: Performed by: NURSE PRACTITIONER

## 2017-02-10 PROCEDURE — 74011000258 HC RX REV CODE- 258: Performed by: EMERGENCY MEDICINE

## 2017-02-10 PROCEDURE — 85025 COMPLETE CBC W/AUTO DIFF WBC: CPT | Performed by: NURSE PRACTITIONER

## 2017-02-10 PROCEDURE — 74011250636 HC RX REV CODE- 250/636: Performed by: NURSE PRACTITIONER

## 2017-02-10 PROCEDURE — 85014 HEMATOCRIT: CPT | Performed by: INTERNAL MEDICINE

## 2017-02-10 PROCEDURE — 74011000258 HC RX REV CODE- 258: Performed by: HOSPITALIST

## 2017-02-10 PROCEDURE — 80048 BASIC METABOLIC PNL TOTAL CA: CPT | Performed by: NURSE PRACTITIONER

## 2017-02-10 PROCEDURE — 74011000250 HC RX REV CODE- 250: Performed by: HOSPITALIST

## 2017-02-10 PROCEDURE — 74011000250 HC RX REV CODE- 250: Performed by: NURSE PRACTITIONER

## 2017-02-10 PROCEDURE — 85018 HEMOGLOBIN: CPT | Performed by: NURSE PRACTITIONER

## 2017-02-10 PROCEDURE — 74011250637 HC RX REV CODE- 250/637: Performed by: HOSPITALIST

## 2017-02-10 PROCEDURE — C9113 INJ PANTOPRAZOLE SODIUM, VIA: HCPCS | Performed by: EMERGENCY MEDICINE

## 2017-02-10 PROCEDURE — 74011250636 HC RX REV CODE- 250/636: Performed by: HOSPITALIST

## 2017-02-10 PROCEDURE — 36415 COLL VENOUS BLD VENIPUNCTURE: CPT | Performed by: NURSE PRACTITIONER

## 2017-02-10 PROCEDURE — 74011250636 HC RX REV CODE- 250/636: Performed by: EMERGENCY MEDICINE

## 2017-02-10 PROCEDURE — 65270000029 HC RM PRIVATE

## 2017-02-10 RX ORDER — BUTALBITAL, ACETAMINOPHEN AND CAFFEINE 50; 325; 40 MG/1; MG/1; MG/1
1 TABLET ORAL
Status: DISCONTINUED | OUTPATIENT
Start: 2017-02-10 | End: 2017-02-11 | Stop reason: HOSPADM

## 2017-02-10 RX ORDER — PANTOPRAZOLE SODIUM 40 MG/1
40 TABLET, DELAYED RELEASE ORAL
Status: DISCONTINUED | OUTPATIENT
Start: 2017-02-10 | End: 2017-02-11 | Stop reason: HOSPADM

## 2017-02-10 RX ORDER — SUCRALFATE 1 G/10ML
1 SUSPENSION ORAL
Status: DISCONTINUED | OUTPATIENT
Start: 2017-02-10 | End: 2017-02-11 | Stop reason: HOSPADM

## 2017-02-10 RX ORDER — ACETAMINOPHEN 325 MG/1
650 TABLET ORAL
Status: DISCONTINUED | OUTPATIENT
Start: 2017-02-10 | End: 2017-02-11 | Stop reason: HOSPADM

## 2017-02-10 RX ORDER — PANTOPRAZOLE SODIUM 40 MG/1
40 TABLET, DELAYED RELEASE ORAL
Qty: 60 TAB | Refills: 0 | Status: SHIPPED
Start: 2017-02-10 | End: 2017-02-11

## 2017-02-10 RX ORDER — SUCRALFATE 1 G/10ML
1 SUSPENSION ORAL
Qty: 560 ML | Refills: 0 | Status: SHIPPED | OUTPATIENT
Start: 2017-02-10 | End: 2017-02-24

## 2017-02-10 RX ADMIN — SODIUM CHLORIDE 8 MG/HR: 900 INJECTION, SOLUTION INTRAVENOUS at 03:30

## 2017-02-10 RX ADMIN — BUTALBITAL, ACETAMINOPHEN AND CAFFEINE 1 TABLET: 50; 325; 40 TABLET ORAL at 09:14

## 2017-02-10 RX ADMIN — Medication 10 ML: at 09:12

## 2017-02-10 RX ADMIN — PANTOPRAZOLE SODIUM 40 MG: 40 TABLET, DELAYED RELEASE ORAL at 17:51

## 2017-02-10 RX ADMIN — SODIUM CHLORIDE 8 MG/HR: 900 INJECTION, SOLUTION INTRAVENOUS at 09:08

## 2017-02-10 RX ADMIN — Medication 10 ML: at 15:19

## 2017-02-10 RX ADMIN — SUCRALFATE 1 G: 1 SUSPENSION ORAL at 17:51

## 2017-02-10 RX ADMIN — SUCRALFATE 1 G: 1 SUSPENSION ORAL at 21:03

## 2017-02-10 RX ADMIN — SUCRALFATE 1 G: 1 SUSPENSION ORAL at 12:26

## 2017-02-10 RX ADMIN — SODIUM CHLORIDE 75 ML/HR: 450 INJECTION, SOLUTION INTRAVENOUS at 04:57

## 2017-02-10 RX ADMIN — BUPRENORPHINE AND NALOXONE 1 TABLET: 8; 2 TABLET SUBLINGUAL at 10:30

## 2017-02-10 RX ADMIN — FOLIC ACID: 5 INJECTION, SOLUTION INTRAMUSCULAR; INTRAVENOUS; SUBCUTANEOUS at 10:34

## 2017-02-10 RX ADMIN — FLUTICASONE PROPIONATE 2 SPRAY: 50 SPRAY, METERED NASAL at 09:46

## 2017-02-10 RX ADMIN — ACETAMINOPHEN 650 MG: 325 TABLET, FILM COATED ORAL at 20:19

## 2017-02-10 RX ADMIN — ACETAMINOPHEN 650 MG: 325 TABLET, FILM COATED ORAL at 07:53

## 2017-02-10 RX ADMIN — DILTIAZEM HYDROCHLORIDE 180 MG: 180 CAPSULE, COATED, EXTENDED RELEASE ORAL at 10:30

## 2017-02-10 RX ADMIN — SODIUM CHLORIDE 10 MG: 9 INJECTION INTRAMUSCULAR; INTRAVENOUS; SUBCUTANEOUS at 09:12

## 2017-02-10 NOTE — PROGRESS NOTES
[address]  [address], [address]       GI PROGRESS NOTE  [facility]  [phone]    NAME: Radha West. :  1950   MRN:  [id number]       Subjective:     Has a headache, no abdominal pain or signs of bleeding overnight    Objective:     VITALS:   Last 24hrs VS reviewed since prior progress note. Most recent are:  Visit Vitals    /77 (BP 1 Location: Right arm, BP Patient Position: At rest)    Pulse 89    Temp 99.3 °F (37.4 °C)    Resp 18    Ht 5' 10\" (1.778 m)    Wt 77.1 kg (170 lb)    SpO2 100%    BMI 24.39 kg/m2       PHYSICAL EXAM:  General: Cooperative, no acute distress    Neurologic:  Alert and oriented X 3. HEENT: PERRLA, EOMI. Lungs:  CTA Bilaterally. No Wheezing  Heart:  s1 s2, Regular  rhythm,  No murmur   Abdomen: Soft, Non distended, Non tender.  +Bowel sounds  Extremities: No edema  Psych:   Good insight. Not anxious nor agitated. Lab Data Reviewed:     Recent Results (from the past 24 hour(s))   CBC W/O DIFF    Collection Time: 17 12:13 PM   Result Value Ref Range    WBC 8.1 4.1 - 11.1 K/uL    RBC 2.86 (L) 4.10 - 5.70 M/uL    HGB 9.3 (L) 12.1 - 17.0 g/dL    HCT 28.7 (L) 36.6 - 50.3 %    .3 (H) 80.0 - 99.0 FL    MCH 32.5 26.0 - 34.0 PG    MCHC 32.4 30.0 - 36.5 g/dL    RDW 13.7 11.5 - 14.5 %    PLATELET 119 797 - 773 K/uL   CBC WITH AUTOMATED DIFF    Collection Time: 02/10/17 12:39 AM   Result Value Ref Range    WBC 8.1 4.1 - 11.1 K/uL    RBC 2.59 (L) 4.10 - 5.70 M/uL    HGB 8.6 (L) 12.1 - 17.0 g/dL    HCT 26.0 (L) 36.6 - 50.3 %    .4 (H) 80.0 - 99.0 FL    MCH 33.2 26.0 - 34.0 PG    MCHC 33.1 30.0 - 36.5 g/dL    RDW 13.5 11.5 - 14.5 %    PLATELET 611 302 - 839 K/uL    NEUTROPHILS 61 32 - 75 %    LYMPHOCYTES 31 12 - 49 %    MONOCYTES 7 5 - 13 %    EOSINOPHILS 1 0 - 7 %    BASOPHILS 0 0 - 1 %    ABS. NEUTROPHILS 4.9 1.8 - 8.0 K/UL    ABS. LYMPHOCYTES 2.5 0.8 - 3.5 K/UL    ABS.  MONOCYTES 0.6 0.0 - 1.0 K/UL ABS. EOSINOPHILS 0.1 0.0 - 0.4 K/UL    ABS. BASOPHILS 0.0 0.0 - 0.1 K/UL   METABOLIC PANEL, BASIC    Collection Time: 02/10/17 12:39 AM   Result Value Ref Range    Sodium 142 136 - 145 mmol/L    Potassium 4.4 3.5 - 5.1 mmol/L    Chloride 108 97 - 108 mmol/L    CO2 29 21 - 32 mmol/L    Anion gap 5 5 - 15 mmol/L    Glucose 96 65 - 100 mg/dL    BUN 24 (H) 6 - 20 MG/DL    Creatinine 0.96 0.70 - 1.30 MG/DL    BUN/Creatinine ratio 25 (H) 12 - 20      GFR est AA >60 >60 ml/min/1.73m2    GFR est non-AA >60 >60 ml/min/1.73m2    Calcium 7.8 (L) 8.5 - 10.1 MG/DL   HEMOGLOBIN    Collection Time: 02/10/17 10:38 AM   Result Value Ref Range    HGB 7.9 (L) 12.1 - 17.0 g/dL         ________________________________________________________________________       Assessment:   · Large pyloric Ulcer s/p clip placement and epinephrine injections. Hgb trends as follows: 9.3, 8.6, 7.9 - no gross signs of bleeding overnight. BUN decreasing as well. 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 He [facility] sees [facility] Cle[facility]  · He should be discharged on BID ppi x 1 week and then daily for 2 months and Carafate for 14 days       Signed By: Geneva Rosado NP     2/10/2017  11:04 AM       GI attending note:    I personally examined the patient. Agree with the note of the NP. Hemodynamically stable but Hgb trending down. Ulcer is high risk for re-bleeding. Have ordered for Hgb check this afternoon-if continued trending down-order for CTA abdomen with IV contrast-GI bleed protocol.  If active bleeding seen, would recommend IR management. Plan discussed with Dr. Verenice Reddy. As an outpatient, pt should be treated for H. Pylori. Quad therapy: bismuth 525 mg four times daily, metronidazole 500 mg four times daily, doxycycline 100 mg twice daily, PPI 40 mg twice daily for a total of 10 days. Afterwards, pt can stay on PPI once daily. Based on clinical course will determine if patient should be discharged today. Weekend coverage to follow if not discharged.     Dr. Sylvie Astudillo

## 2017-02-10 NOTE — PROGRESS NOTES
Observation Progress Note  Sherren Craven, NP        NAME:  Horace Diaz. :  1950  MRN:  [id number]  Date of Service:  2/10/2017  11:53 AM    Assessment/Plan:       GI bleed with large gastric ulcer  - s/p EGD with bleeding peptic ulcer  - continue protonix drip-> switched to po BID  - advance diet to GI lite diet  - trend hgb this am dropped to 7.9, will recheck hgb at 1600 if greater than 8 can discharge per GI   -Gi following- will need f/u OP with Dr Medardo Summers at [address] with eventual repeat EGD to eval healing.   -Carafate    Hypertension  - home meds    Chronic Hep C  - supportive measures    EtOH abuse  - CIWA protocol   -cessation education provided    Headache  -not relieved by Tylenol   -give one time dose Compazine and prn Fioricet     Dispo: if hgb >8.0 on repeat hgb can discharge home tonight       Subjective:     HPI Summary:  75 yo male with melena and hematemesis. Admitted for GI bleed, scoped by GI with bleeding peptic ulcer that was hemostatic with clips and epinephrine. Review of Systems:  Patient denies fever, chills, chest pain, shortness of breath, abdominal pain, nausea, vomiting, diarrhea, and dysuria.    + melena however notes his BMs are a lighter brown. Denies any bright red blood   +headache, Denies vision changes, slurred speech or weakness. Objective:     VITALS:   VS reviewed since presentation. Most recent are:  Visit Vitals    /77 (BP 1 Location: Right arm, BP Patient Position: At rest)    Pulse 89    Temp 99.3 °F (37.4 °C)    Resp 18    Ht 5' 10\" (1.778 m)    Wt 77.1 kg (170 lb)    SpO2 100%    BMI 24.39 kg/m2       Intake/Output Summary (Last 24 hours) at 02/10/17 1146  Last data filed at 02/10/17 0801   Gross per 24 hour   Intake          1828.74 ml   Output                0 ml   Net          1828.74 ml        PHYSICAL EXAM:  General: No acute distress, cooperative, pleasant   EENT: EOMI. Anicteric sclerae.  Oral mucous moist, oropharynx benign  Resp: CTA bilaterally. No wheezing/rhonchi/rales. No accessory muscle use  CV: Regular rhythm, normal rate, no murmurs, gallops, rubs  GI: Soft, non distended, non tender. normoactive bowel sounds,   Extremities: No edema, warm, 2+ pulses throughout  Neurologic: Moves all extremities. AAOx3, CN II-XII grossly intact  Psych: Good insight. Not anxious nor agitated.   Skin: Good Turgor, no rashes or ulcers          Lab Data Personally Reviewed:  x YES  NO     Medications list Personally Reviewed:  x YES  NO     Imaging/Procedures Reviewed:  x YES  NO       _______________________________________________________________________  Care Plan discussed with:  Patient/Family, Nurse and Other MD Milind, Cash Tanner NP    Total time spent coordinating care:  20 minutes    Raquel Alejandro NP

## 2017-02-10 NOTE — DISCHARGE SUMMARY
Discharge Summary       PATIENT ID: Keisha Alfaro. MRN: [id number]   YOB: 1950    DATE OF ADMISSION: 2/8/2017  5:13 AM    DATE OF DISCHARGE: 2/10/2017   PRIMARY CARE PROVIDER: Ej Trimble MD     ATTENDING PHYSICIAN: Nilsa Hale MD  DISCHARGING PROVIDER: Shadia Easton NP    To contact this individual call [phone] and ask the  to page. If unavailable ask to be transferred the Adult Hospitalist Department. CONSULTATIONS: IP CONSULT TO GASTROENTEROLOGY  IP CONSULT TO GASTROENTEROLOGY  IP CONSULT TO HOSPITALIST    PROCEDURES/SURGERIES: Procedure(s):  ESOPHAGOGASTRODUODENOSCOPY (EGD)  RESOLUTION CLIP  INJECTION    ADMITTING DIAGNOSES & HOSPITAL COURSE:   Dx: Gi bleed with large gastric ulcer     73 yo male with melena and hematemesis. Admitted for GI bleed, scoped by GI with bleeding peptic ulcer that was hemostatic with clips and epinephrine.     Hgb stable, tolerating po diet. Pt stable for discharge home.      DISCHARGE DIAGNOSES / PLAN:      GI bleed with large gastric ulcer  - s/p EGD with bleeding peptic ulcer (see full report below)  - continue protonix drip-> switched to po BID  - advance diet to GI lite diet  - trend hgb this am dropped to 7.9 repeat remained stable at 8.4  -Gi following- will need f/u OP with Dr Portia Dial at [facility] with eventual repeat EGD to eval healing.   -tx for h pylori with quad therapy: bismuth 525 mg four times daily, metronidazole 500 mg four times daily, doxycycline 100 mg twice daily, PPI 40 mg twice daily for a total of 10 days.   -Carafate x14 days  -stable for discharge, f/u with pcp to have hgb rechecked     Hypertension  - home meds     Chronic Hep C  - supportive measures     EtOH abuse  - CIWA protocol   -cessation education provided     Headache-resolved  -not relieved by Tylenol   -give one time dose Compazine and prn Dell Mccloud MD Physician Addendum Gastroenterology Procedures Date of Service: 17 1755     Pre-procedure Diagnoses:     Melena [K92.1]     Hematemesis, presence of nausea not specified [K92.0]         Post-procedure Diagnoses:     Gastric ulcer, unspecified chronicity [K25.9]         Procedures:     MA ESOPHAGEAL IMPLANT INJECTION VIA EGD [0133T (CPT®)]             []Hide copied text  []Hover for attribution information  [address] Mazin Finnegan M.D.  [place] [id number], [facility]  [phone]          Esophagogastroduodenoscopy (EGD) Procedure Note     NAME: Jesus Suero. :  1950  MRN:  [id number]     Indications:  Hematemesis; melena     : Mary Jo Pena MD     Referring Provider: Jose Lincoln MD     Medicine: Benadryl 50mg IV; Fentanyl 100 mcg IV, Versed 4 mg IV      Procedure Details:  After informed consent was obtained with all risks and benefits of the procedure explained and preprocedure exam completed, the patient was placed in the left lateral decubitus position. Universal protocol for patient identification was performed and documented in the nursing notes. Throughout the procedure, the patient's blood pressure was monitored at least every five minutes; pulse, and oxygen saturations were monitored continuously. All vital signs were documented in the nursing notes. The endoscope was inserted into the mouth and advanced under direct vision to second portion of the duodenum. A careful inspection was made as the gastroscope was withdrawn, including a retroflexed view of the proximal stomach; findings and interventions are described below.      Findings:   Esophagus: normal  Stomach: small hiatal hernia, unable to visualize the fundus and some of the gastric body due to old blood clots which could not be suctioned through the scope, pyloric 14 mm crater ulcer with adherent clot s/p clot removal with 1 mL epinephrine injection at 4 quadrants around the ulcer and placement of 4 hemoclips.   Duodenum: normal     Interventions:   injection of 4 cc of epinephrine and 4 hemoclips     Specimens:   * No specimens in log *      EBL: None      Complications:   No immediate complications      Impression:  -Pyloric gastric ulcer with adherent clot s/p epinephrine injection and 4 hemoclips. Likely cause of hematemesis and melena. PENDING TEST RESULTS:   At the time of discharge the following test results are still pending:none    FOLLOW UP APPOINTMENTS:    Follow-up Information     Follow up With Details Comments Whitney Kaye MD In 3 days Hospital follow up [address]      Bozena Dobbins MD  Call to schedule a follow up in 2-3 weeks  [facility]. [facility] 87:   1. We have prescribed you the following new medications:  -Carafate and Protonix for your stomach ulcer. See below for more information to include common side effects.   -Flagyl, Doxycycline, and Bismuth for the H.Pylori infection. See below for more information to include common side effects. 2. Make sure to call to schedule a follow up with your GI doctor Dr Florida Gabriel in 2-3 weeks. You will need to follow up with him as you will need a repeat EGD  3. Avoid alcohol use. 4. Avoid NSAID medications such as Ibuprofen, Aspirin, Aleve, Naproxen. 5. Follow up with your pcp on Monday to have your labs rechecked (hemoglobin). 6. If you have any blood in your stool or increase in the darkness of your stool please return to the ER. DIET: Gi lite diet     ACTIVITY: as tolerated    WOUND CARE: none    EQUIPMENT needed: none      DISCHARGE MEDICATIONS:  Current Discharge Medication List      START taking these medications    Details   pantoprazole (PROTONIX) 40 mg tablet Take 1 Tab by mouth Before breakfast and dinner.  Script on chart from GI  40 mg twice daily for a total of 10 days then decrease to 40 mg daily  Qty: 60 Tab, Refills: 0      metroNIDAZOLE (FLAGYL) 500 mg tablet Take 1 Tab by mouth three (3) times daily for 10 days. Indications: HELICOBACTER PYLORI GASTRITIS  Qty: 30 Tab, Refills: 0      bismuth subsalicylate 382 mg tab Take 2 Tabs by mouth four (4) times daily for 10 days. Qty: 80 Tab, Refills: 0      doxycycline (ADOXA) 100 mg tablet Take 1 Tab by mouth two (2) times a day for 10 days. Indications: h pyolri  Qty: 20 Tab, Refills: 0      sucralfate (CARAFATE) 100 mg/mL suspension Take 10 mL by mouth Before breakfast, lunch, dinner and at bedtime for 14 days. Indications: Duodenal Ulcer  Qty: 560 mL, Refills: 0         CONTINUE these medications which have NOT CHANGED    Details   fluticasone (FLONASE) 50 mcg/actuation nasal spray 2 Sprays by Both Nostrils route daily as needed for Rhinitis or Allergies. Indications: ALLERGIC RHINITIS      aspirin-acetaminophen-caffeine (EXCEDRIN ES) 250-250-65 mg per tablet Take 1-2 Tabs by mouth every eight (8) hours as needed for Headache. dilTIAZem CD (CARDIZEM CD) 180 mg ER capsule Take 1 Cap by mouth daily. Qty: 90 Cap, Refills: 1    Associated Diagnoses: Essential hypertension      buprenorphine-naloxone (SUBOXONE) 8-2 mg Subl 2 Tabs by SubLINGual route daily. Qty: 60 Tab, Refills: 2      multivitamin (ONE A DAY) tablet Take 1 Tab by mouth daily. NOTIFY YOUR PHYSICIAN FOR ANY OF THE FOLLOWING:   Fever over 101 degrees for 24 hours. Chest pain, shortness of breath, fever, chills, nausea, vomiting, diarrhea, change in mentation, falling, weakness, bleeding. Severe pain or pain not relieved by medications. Or, any other signs or symptoms that you may have questions about.     DISPOSITION:  X  Home With:   OT  PT  HH  RN       Long term SNF/Inpatient Rehab    Independent/assisted living    Hospice    Other:       PATIENT CONDITION AT DISCHARGE:     Functional status    Poor     Deconditioned    X Independent      Cognition   X  Lucid Forgetful     Dementia      Catheters/lines (plus indication)    [place]     PICC     PEG    X None      Code status   X  Full code     DNR      PHYSICAL EXAMINATION AT DISCHARGE:  General: No acute distress, cooperative, pleasant   EENT: EOMI. Anicteric sclerae. Oral mucous moist, oropharynx benign  Resp: CTA bilaterally. No wheezing/rhonchi/rales. No accessory muscle use  CV: Regular rhythm, normal rate, no murmurs, gallops, rubs  GI: Soft, non distended, non tender. normoactive bowel sounds,   Extremities: No edema, warm, 2+ pulses throughout  Neurologic: Moves all extremities. AAOx3, CN II-XII grossly intact  Psych: Good insight. Not anxious nor agitated.   Skin: Good Turgor, no rashes or ulcers      CHRONIC MEDICAL DIAGNOSES:  Problem List as of 2/11/2017  Date Reviewed: 2/10/2017          Codes Class Noted - Resolved    Peptic ulcer with hemorrhage ICD-10-CM: K27.4  ICD-9-CM: 533.40  2/9/2017 - Present        Vitamin D deficiency ICD-10-CM: E55.9  ICD-9-CM: 268.9  5/19/2015 - Present        Cervical pain (neck) ICD-10-CM: M54.2  ICD-9-CM: 723.1  3/13/2015 - Present        Opiate dependence ([facility] [address] ICD-10-CM: F11.20  ICD-9-CM: 304.00  8/3/2011 - Present        Stricture of colon ([facility] [address] ICD-10-CM: K56.69  ICD-9-CM: 560.9  8/27/2010 - Present        Hypertension ICD-10-CM: I10  ICD-9-CM: 401.9  8/27/2010 - Present        Hepatitis C ICD-10-CM: B19.20  ICD-9-CM: 070.70  8/27/2010 - Present              Greater than 30 minutes were spent with the patient on counseling and coordination of care    Signed:   Jordan Ga NP  2/11/2017  11:54 AM

## 2017-02-11 VITALS
HEART RATE: 97 BPM | WEIGHT: 170 LBS | DIASTOLIC BLOOD PRESSURE: 61 MMHG | HEIGHT: 70 IN | TEMPERATURE: 98.2 F | RESPIRATION RATE: 16 BRPM | OXYGEN SATURATION: 95 % | SYSTOLIC BLOOD PRESSURE: 95 MMHG | BODY MASS INDEX: 24.34 KG/M2

## 2017-02-11 LAB
BASOPHILS # BLD AUTO: 0 K/UL (ref 0–0.1)
BASOPHILS # BLD: 0 % (ref 0–1)
EOSINOPHIL # BLD: 0.2 K/UL (ref 0–0.4)
EOSINOPHIL NFR BLD: 3 % (ref 0–7)
ERYTHROCYTE [DISTWIDTH] IN BLOOD BY AUTOMATED COUNT: 13.1 % (ref 11.5–14.5)
HCT VFR BLD AUTO: 25.2 % (ref 36.6–50.3)
HGB BLD-MCNC: 8.4 G/DL (ref 12.1–17)
LYMPHOCYTES # BLD AUTO: 49 % (ref 12–49)
LYMPHOCYTES # BLD: 2.7 K/UL (ref 0.8–3.5)
MCH RBC QN AUTO: 32.7 PG (ref 26–34)
MCHC RBC AUTO-ENTMCNC: 33.3 G/DL (ref 30–36.5)
MCV RBC AUTO: 98.1 FL (ref 80–99)
MONOCYTES # BLD: 0.4 K/UL (ref 0–1)
MONOCYTES NFR BLD AUTO: 8 % (ref 5–13)
NEUTS SEG # BLD: 2.2 K/UL (ref 1.8–8)
NEUTS SEG NFR BLD AUTO: 40 % (ref 32–75)
PLATELET # BLD AUTO: 152 K/UL (ref 150–400)
RBC # BLD AUTO: 2.57 M/UL (ref 4.1–5.7)
WBC # BLD AUTO: 5.5 K/UL (ref 4.1–11.1)

## 2017-02-11 PROCEDURE — 85025 COMPLETE CBC W/AUTO DIFF WBC: CPT | Performed by: NURSE PRACTITIONER

## 2017-02-11 PROCEDURE — 74011250637 HC RX REV CODE- 250/637: Performed by: NURSE PRACTITIONER

## 2017-02-11 PROCEDURE — 36415 COLL VENOUS BLD VENIPUNCTURE: CPT | Performed by: NURSE PRACTITIONER

## 2017-02-11 PROCEDURE — 74011250637 HC RX REV CODE- 250/637: Performed by: HOSPITALIST

## 2017-02-11 PROCEDURE — 74011000258 HC RX REV CODE- 258: Performed by: HOSPITALIST

## 2017-02-11 RX ORDER — PANTOPRAZOLE SODIUM 40 MG/1
40 TABLET, DELAYED RELEASE ORAL
Qty: 60 TAB | Refills: 0 | Status: SHIPPED
Start: 2017-02-11 | End: 2017-12-08 | Stop reason: ALTCHOICE

## 2017-02-11 RX ORDER — DOXYCYCLINE 100 MG/1
100 TABLET ORAL 2 TIMES DAILY
Qty: 20 TAB | Refills: 0 | Status: SHIPPED | OUTPATIENT
Start: 2017-02-11 | End: 2017-02-21

## 2017-02-11 RX ORDER — METRONIDAZOLE 500 MG/1
500 TABLET ORAL 3 TIMES DAILY
Qty: 30 TAB | Refills: 0 | Status: SHIPPED | OUTPATIENT
Start: 2017-02-11 | End: 2017-02-21

## 2017-02-11 RX ADMIN — BUPRENORPHINE AND NALOXONE 2 TABLET: 8; 2 TABLET SUBLINGUAL at 08:06

## 2017-02-11 RX ADMIN — DILTIAZEM HYDROCHLORIDE 180 MG: 180 CAPSULE, COATED, EXTENDED RELEASE ORAL at 08:06

## 2017-02-11 RX ADMIN — PANTOPRAZOLE SODIUM 40 MG: 40 TABLET, DELAYED RELEASE ORAL at 07:01

## 2017-02-11 RX ADMIN — ACETAMINOPHEN 650 MG: 325 TABLET, FILM COATED ORAL at 05:35

## 2017-02-11 RX ADMIN — SODIUM CHLORIDE 75 ML/HR: 450 INJECTION, SOLUTION INTRAVENOUS at 02:43

## 2017-02-11 RX ADMIN — SUCRALFATE 1 G: 1 SUSPENSION ORAL at 07:01

## 2017-02-11 NOTE — DISCHARGE INSTRUCTIONS
Discharge Instructions       PATIENT ID: Skip Santacruz. MRN: [id number]   YOB: 1950    DATE OF ADMISSION: 2/8/2017  5:13 AM    DATE OF DISCHARGE: 2/11/2017    PRIMARY CARE PROVIDER: Remedios Berumen MD     ATTENDING PHYSICIAN: Natalia Vaz*  DISCHARGING PROVIDER: Anaid Randall NP    To contact this individual call [phone] and ask the  to page. If unavailable ask to be transferred the Adult Hospitalist Department. DISCHARGE DIAGNOSES Gi bleed, Gastric Ulcer    CONSULTATIONS: IP CONSULT TO GASTROENTEROLOGY  IP CONSULT TO GASTROENTEROLOGY  IP CONSULT TO HOSPITALIST    PROCEDURES/SURGERIES: Procedure(s):  ESOPHAGOGASTRODUODENOSCOPY (EGD)  RESOLUTION CLIP  INJECTION    PENDING TEST RESULTS:   At the time of discharge the following test results are still pending: none    FOLLOW UP APPOINTMENTS:   Follow-up Information     Follow up With Details Comments Contact Info    Remedios Berumen MD In 3 days Hospital follow up [place] [id number]      Bozena Dobbins MD  Call to schedule a follow up in 2-3 weeks  [place] [address]. [id number]:   1. We have prescribed you the following new medications:  -Carafate and Protonix for your stomach ulcer. See below for more information to include common side effects.   -Flagyl, Doxycycline, and Bismuth for the H.Pylori infection. See below for more information to include common side effects. 2. Make sure to call to schedule a follow up with your GI doctor Dr [place] in 2-3 weeks. You will need to follow up with him as you will need a repeat EGD  3. Avoid alcohol use. 4. Avoid NSAID medications such as Ibuprofen, Aspirin, Aleve, Naproxen. 5. Follow up with your pcp on Monday to have your labs rechecked (hemoglobin).    6. If you have any blood in your stool or increase in the darkness of your stool please return to the ER. DIET: Gi lite diet     ACTIVITY: as tolerated    WOUND CARE: none    EQUIPMENT needed: none    DISCHARGE MEDICATIONS:  Doxycycline (By mouth)   Doxycycline ([id number]-RNT-[id number])  Treats and prevents infections. Also used to prevent malaria and treat rosacea or severe acne. This medicine is a tetracycline antibiotic. Brand Name(s):Acticlate, Adoxa, Adoxa Lucas 1/150, Avidoxy, Avidoxy DK, BenzoDox 30 Kit, BenzoDox 60 Kit, Doryx, Monodox, Morgidox 3E217QL, Morgidox 7u133SL Kit, Morgidox 2E806AN, Morgidox 6l995RY Kit, NutriDox Convenience, Oracea   There may be other brand names for this medicine. When This Medicine Should Not Be Used: This medicine is not right for everyone. Do not use it if you had an allergic reaction to doxycycline or another tetracycline antibiotic, or if you are pregnant or breastfeeding. How to Use This Medicine:   Capsule, Delayed Release Capsule, Long Acting Capsule, Liquid, Tablet, Delayed Release Tablet  · Your doctor will tell you how much medicine to use. Do not use more than directed. · Ask your pharmacist or doctor if you need to take this medicine with or without food. Some forms can be taken with food or milk, but others must be taken on an empty stomach. · Tablets: You may take this medicine with food or milk to avoid stomach irritation. To break a tablet, hold the tablet between your thumb and index fingers close to the appropriate scored line. Then, apply enough pressure to snap the tablet segments apart. Do not use the tablet if it does not break on the scored lines. · Delayed-release tablets: You may also take this medicine by sprinkling the broken tablets onto room-temperature applesauce. Swallow this mixture right away; do not chew. Do not store the mixture for later use. · Oracea® capsules: This medicine must be taken on an empty stomach, at least 1 hour before or 2 hours after a meal.  · Capsule: Swallow whole.  Do not break, crush, chew, or open it.  · Oral liquid: Shake the bottle well just before each use. Measure the oral liquid medicine with a marked measuring spoon, oral syringe, or medicine cup. · Take all of the medicine in your prescription to clear up your infection, even if you feel better after the first few doses. · Drink plenty of fluids to avoid throat problems, if you take the capsule or tablet form. · Malaria prevention: Start taking the medicine 1 or 2 days before you travel. Take the medicine every day during your trip. Keep taking it for 4 weeks after you return. However, do not use the medicine for longer than 4 months. · Do not use this medicine for more than 9 months if you are using it for rosacea. · Use only the brand of medicine your doctor prescribed. Other brands may not work the same way. · Read and follow the patient instructions that come with this medicine. Talk to your doctor or pharmacist if you have any questions. · Missed dose: Take a dose as soon as you remember. If it is almost time for your next dose, wait until then and take a regular dose. Do not take extra medicine to make up for a missed dose. · Store the medicine in a closed container at room temperature, away from heat, moisture, and direct light. Do not freeze the oral liquid. Drugs and Foods to Avoid:   Ask your doctor or pharmacist before using any other medicine, including over-the-counter medicines, vitamins, and herbal products. · Some foods and medicines can affect how doxycycline works.  Tell your doctor if you are using any of the following:  ¨ Bismuth subsalicylate, isotretinoin or other acne medicines, acitretin or other medicine to treat psoriasis  ¨ Penicillin antibiotic, birth control pills, medicine for seizures (such as carbamazepine, phenobarbital, phenytoin), stomach medicine, a blood thinner (such as warfarin), or any medicine that contains aluminum, calcium, or iron (such an antacid or vitamin supplement)  Warnings While Using This Medicine:   · This medicine may cause birth defects if either partner is using it during conception or pregnancy. Tell your doctor right away if you or your partner becomes pregnant. Birth control pills may not work as well when used with this medicine. Use a second form of birth control to keep from getting pregnant. · Tell your doctor if you have kidney disease, liver disease, asthma, or an allergy to sulfites. Tell your doctor if you had surgery on your stomach, or if you have a history of yeast infections. · This medicine may cause the following problems:  ¨ Permanent change in tooth color (in children younger than 6years old)  ¨ Increased pressure inside the head  ¨ Yeast infection  ¨ Immune system problems  · This medicine can cause diarrhea. Call your doctor if the diarrhea becomes severe, does not stop, or is bloody. Do not take any medicine to stop diarrhea until you have talked to your doctor. Diarrhea can occur 2 months or more after you stop taking this medicine. · This medicine may make your skin more sensitive to sunlight. Wear sunscreen. Do not use sunlamps or tanning beds. · Tell any doctor or dentist who treats you that you are using this medicine. This medicine may affect certain medical test results. · Call your doctor if your symptoms do not improve or if they get worse. · Keep all medicine out of the reach of children. Never share your medicine with anyone.   Possible Side Effects While Using This Medicine:   Call your doctor right away if you notice any of these side effects:  · Allergic reaction: Itching or hives, swelling in your face or hands, swelling or tingling in your mouth or throat, chest tightness, trouble breathing  · Blistering, peeling, red skin rash  · Burning, pain, or irritation in your upper stomach or throat  · Diarrhea that may contain blood  · Fever, chills, cough, runny or stuffy nose, sore throat, and body aches  · Joint pain, fever, rash, and unusual tiredness or weakness  · Severe headache, dizziness, or vision changes  If you notice these less serious side effects, talk with your doctor:   · Darkening of your skin, scars, teeth, or gums  · Sores or white patches on your lips, mouth, or throat  If you notice other side effects that you think are caused by this medicine, tell your doctor. Call your doctor for medical advice about side effects. You may report side effects to FDA at 7-898-YWV-6986  © 2016 [address] is for End User's use only and may not be sold, redistributed or otherwise used for commercial purposes. The above information is an  only. It is not intended as medical advice for individual conditions or treatments. Talk to your doctor, nurse or pharmacist before following any medical regimen to see if it is safe and effective for you. Metronidazole (By mouth)   Metronidazole (met-esmer-LOIDA-da-zole)  Treats bacterial infections. Brand Name(s):Flagyl, Flagyl 375, Flagyl ER   There may be other brand names for this medicine. When This Medicine Should Not Be Used: This medicine is not right for everyone. Do not use if you had an allergic reaction to metronidazole or similar medicines. Do not use this medicine to treat trichomoniasis if you are in the first 3 months of pregnancy. How to Use This Medicine:   Capsule, Tablet, Long Acting Tablet  · Take this medicine as directed, and take it at the same time each day. · Capsule or Tablet: Take with food or milk to avoid stomach upset. · Extended-release tablet: Take it on an empty stomach, 1 hour before or 2 hours after a meal.  · Swallow the extended-release tablet whole. Do not crush, break, or chew it. · Take all of the medicine in your prescription to clear up your infection, even if you feel better after the first few doses. · Missed dose: Take a dose as soon as you remember. If it is almost time for your next dose, wait until then and take a regular dose. Do not take extra medicine to make up for a missed dose. · Store the medicine in a closed container at room temperature, away from heat, moisture, and direct light. Drugs and Foods to Avoid:   Ask your doctor or pharmacist before using any other medicine, including over-the-counter medicines, vitamins, and herbal products. · Do not use this medicine if you have taken disulfiram within the last 2 weeks. Do not drink alcohol or use medicine that contains alcohol or propylene glycol while using this medicine and for at least 3 days after you have finished metronidazole treatment. · Some foods and medicines can affect how metronidazole works. Tell your doctor if you are using busulfan, cimetidine, lithium, phenobarbital, phenytoin, or warfarin or another blood thinner. Warnings While Using This Medicine:   · Tell your doctor if you are pregnant or breastfeeding, or if you have kidney disease, liver disease, blood or bone marrow problems, oral thrush, yeast infection, or a history of seizures. · This medicine may cause the following problems:  ¨ Brain or nervous system problems, including seizures, meningitis, or vision problems  · Trichomoniasis treatment:  Your doctor may want to also treat your sexual partner, even if he or she has no symptoms. Also, you may want to use a condom during sexual intercourse. These measures will help keep you from getting the infection back again from your partner. If you have any questions, ask your doctor. · Call your doctor if your symptoms do not improve or if they get worse. · Your doctor will do lab tests at regular visits to check on the effects of this medicine. Keep all appointments. · Tell any doctor or dentist who treats you that you are using this medicine. This medicine may affect certain medical test results. · Keep all medicine out of the reach of children. Never share your medicine with anyone.   Possible Side Effects While Using This Medicine:   Call your doctor right away if you notice any of these side effects:  · Allergic reaction: Itching or hives, swelling in your face or hands, swelling or tingling in your mouth or throat, chest tightness, trouble breathing  · Confusion, drowsiness, fever, headache, loss of appetite, nausea or vomiting, stiff neck or back  · Dizziness, problems with muscle control, clumsiness, shakiness, trouble talking  · Fever, chills, cough, sore throat, and body aches  · Numbness, tingling, or burning pain in your hands, arms, legs, or feet  · Seizures  If you notice these less serious side effects, talk with your doctor:   · Mild nausea  · Unusual or unpleasant taste in your mouth  If you notice other side effects that you think are caused by this medicine, tell your doctor. Call your doctor for medical advice about side effects. You may report side effects to FDA at 8-879-FDA-7392  © 2016 [address] is for End User's use only and may not be sold, redistributed or otherwise used for commercial purposes. The above information is an  only. It is not intended as medical advice for individual conditions or treatments. Talk to your doctor, nurse or pharmacist before following any medical regimen to see if it is safe and effective for you. Pantoprazole (By mouth)   Pantoprazole (pan-TOE-pra-zole)  Treats gastroesophageal reflux disease (GERD), a damaged esophagus, and high levels of stomach acid. This medicine is a proton pump inhibitor (PPI). Brand Name(s):Protonix   There may be other brand names for this medicine. When This Medicine Should Not Be Used: This medicine is not right for everyone. Do not use it if you had an allergic reaction to pantoprazole. How to Use This Medicine:   Packet, Tablet, Delayed Release Tablet, Long Acting Tablet  · Your doctor will tell you how much medicine to use. Do not use more than directed.  Take the medicine at least 30 minutes before a meal.  · Delayed-release tablet: Swallow the tablet whole. Do not crush, break, or chew it. · Delayed-release packet:   ¨ To prepare with applesauce:   § Mix the packet contents with 1 teaspoon of applesauce. Do not mix with water, or other liquids or food. Do not divide the packet contents to make smaller doses. § Swallow the mixture within 10 minutes after you mix it. Do not chew or crush the granules. § Sip some water after you take the mixture to make sure you swallow all of the medicine. ¨ To prepare with apple juice:   § Mix the packet contents with 1 teaspoon of apple juice in a small cup. Do not divide the packet contents to make smaller doses. § Stir for 5 seconds and drink the mixture immediately. Do not chew or crush the granules. § To make sure you get all of the medicine, add more apple juice to the cup. Drink it immediately. ¨ To prepare for a feeding tube:   § Pour the packet contents in a 2-ounce (60 milliliter [mL]) catheter-tip syringe. § Add 10 mL of apple juice to the syringe. Add the mixture to the tube. Gently tap or shake the barrel of the syringe to help empty it. § Add 10 mL of apple juice to the syringe and put it in the tube. Do this at least 2 times. There should be no granules left in the syringe. · This medicine should come with a Medication Guide. Ask your pharmacist for a copy if you do not have one. · Missed dose: Take a dose as soon as you remember. If it is almost time for your next dose, wait until then and take a regular dose. Do not take extra medicine to make up for a missed dose. · Store the medicine in a closed container at room temperature, away from heat, moisture, and direct light. Drugs and Foods to Avoid:   Ask your doctor or pharmacist before using any other medicine, including over-the-counter medicines, vitamins, and herbal products. · Some medicines can affect how pantoprazole works.  Tell your doctor if you are using any of the following: ¨ Ampicillin, atazanavir, digoxin, erlotinib, ketoconazole, methotrexate, mycophenolate mofetil, or nelfinavir  ¨ Blood thinner (such as warfarin)  ¨ Diuretic (water pill)  ¨ Iron supplements  Warnings While Using This Medicine:   · Tell your doctor if you are pregnant or breastfeeding, or if you have liver disease or osteoporosis. · This medicine may cause the following problems:  ¨ Kidney problems  ¨ Low vitamin B12 or magnesium levels  ¨ Increased risk of broken bones in the hip, wrist, or spine  · This medicine can cause diarrhea. Call your doctor if the diarrhea becomes severe, does not stop, or is bloody. Do not take any medicine to stop diarrhea until you have talked to your doctor. Diarrhea can occur 2 months or more after you stop taking this medicine. · Tell any doctor or dentist who treats you that you are using this medicine. This medicine may affect certain medical test results. · Your doctor will check your progress and the effects of this medicine at regular visits. Keep all appointments. · Keep all medicine out of the reach of children. Never share your medicine with anyone. Possible Side Effects While Using This Medicine:   Call your doctor right away if you notice any of these side effects:  · Allergic reaction: Itching or hives, swelling in your face or hands, swelling or tingling in your mouth or throat, chest tightness, trouble breathing  · Blistering, peeling, red skin rash  · Fever, joint pain, swelling in your body, unusual weight gain, change in how much or how often you urinate  · Seizures, dizziness, uneven heartbeat, muscle cramps or twitching  · Severe diarrhea, stomach cramps, fever  · Stomach pain, nausea, vomiting, weight loss  If you notice these less serious side effects, talk with your doctor:   · Mild diarrhea  If you notice other side effects that you think are caused by this medicine, tell your doctor. Call your doctor for medical advice about side effects.  You may report side effects to FDA at 7-462-FDA-9447  © 2016 [address] is for End User's use only and may not be sold, redistributed or otherwise used for commercial purposes. The above information is an  only. It is not intended as medical advice for individual conditions or treatments. Talk to your doctor, nurse or pharmacist before following any medical regimen to see if it is safe and effective for you. Sucralfate (By mouth)   Sucralfate (qfx-GZJJ-qfse)  Treats ulcers. Brand Name(s):Carafate   There may be other brand names for this medicine. When This Medicine Should Not Be Used: You should not use this medicine if you have had an allergic reaction to it. How to Use This Medicine:   Tablet, Liquid  · Your doctor will tell you how much to take and how often. · Do not stop taking the medicine unless your doctor tells you to, even if you feel better. · Take your medicine on an empty stomach. Swallow the tablet with a glass of water. · Unless your doctor tells you otherwise, take the medicine 1 hour before meals and at bedtime. · Measure the oral liquid medicine using a measuring spoon or medicine cup. · Shake the oral liquid medicine well before using. If a dose is missed:   · Take your medicine as soon as you remember that you missed your dose. · If it is nearly time for your next dose, wait until then to take your medicine and skip the missed dose. · You should not use two doses at one time. How to Store and Dispose of This Medicine:   · Keep the medicine at room temperature, away from heat, light, and moisture. Do not freeze the oral liquid. · Keep all medicine out of the reach of children. Drugs and Foods to Avoid:   Ask your doctor or pharmacist before using any other medicine, including over-the-counter medicines, vitamins, and herbal products. · Antacids may be taken one-half hour before or after taking sucralfate.   · You should not take other medicines within 2 hours before or after taking sucralfate. If you need help deciding the best times to take your other medicines, ask your doctor or pharmacist.  Warnings While Using This Medicine:   · If you are pregnant or breastfeeding, talk to your doctor before taking this medicine. · Check with your doctor before taking if you have kidney problems or are on dialysis. Possible Side Effects While Using This Medicine:   Call your doctor right away if you notice any of these side effects:  · Rash, hives, or itching  · Swelling of the face or mouth  If you notice these less serious side effects, talk with your doctor:   · Constipation  · Dry mouth  · Mild stomach cramps, diarrhea  · Upset stomach, gas  · Dizziness  If you notice other side effects that you think are caused by this medicine, tell your doctor. Call your doctor for medical advice about side effects. You may report side effects to FDA at 1-501-FDA-9192  © 2016 [address] is for End User's use only and may not be sold, redistributed or otherwise used for commercial purposes. The above information is an  only. It is not intended as medical advice for individual conditions or treatments. Talk to your doctor, nurse or pharmacist before following any medical regimen to see if it is safe and effective for you. See Medication Reconciliation Form    · It is important that you take the medication exactly as they are prescribed. · Keep your medication in the bottles provided by the pharmacist and keep a list of the medication names, dosages, and times to be taken in your wallet. · Do not take other medications without consulting your doctor. NOTIFY YOUR PHYSICIAN FOR ANY OF THE FOLLOWING:   Fever over 101 degrees for 24 hours. Chest pain, shortness of breath, fever, chills, nausea, vomiting, diarrhea, change in mentation, falling, weakness, bleeding.  Severe pain or pain not relieved by medications. Or, any other signs or symptoms that you may have questions about. DISPOSITION:   X Home With:   OT  PT  HH  RN       SNF/Inpatient Rehab/LTAC    Independent/assisted living    Hospice    Other:     PROBLEM LIST Updated: Yes X       Signed:   John Mejia NP  2/11/2017  12:01 PM         Gastrointestinal Bleeding: Care Instructions  Your Care Instructions    The digestive or gastrointestinal tract goes from the mouth to the anus. It is often called the GI tract. Bleeding can happen anywhere in the GI tract. It may be caused by an ulcer, an infection, or cancer. It may also be caused by medicines such as aspirin or ibuprofen. Light bleeding may not cause any symptoms at first. But if you continue to bleed for a while, you may feel very weak or tired. Sudden, heavy bleeding means you need to see a doctor right away. This kind of bleeding can be very dangerous. But it can usually be cured or controlled. The doctor may do some tests to find the cause of your bleeding. Follow-up care is a key part of your treatment and safety. Be sure to make and go to all appointments, and call your doctor if you are having problems. It's also a good idea to know your test results and keep a list of the medicines you take. How can you care for yourself at home? · Be safe with medicines. Take your medicines exactly as prescribed. Call your doctor if you think you are having a problem with your medicine. You will get more details on the specific medicines your doctor prescribes. · Do not take aspirin or other anti-inflammatory medicines, such as naproxen (Aleve) or ibuprofen (Advil, Motrin), without talking to your doctor first. Ask your doctor if it is okay to use acetaminophen (Tylenol). · Do not drink alcohol. · The bleeding may make you lose iron. So it's important to eat foods that have a lot of iron. These include red meat, shellfish, poultry, and eggs.  They also include beans, raisins, whole-grain breads, and leafy green vegetables. If you want help planning meals, you can make an appointment with a dietitian. When should you call for help? Call 911 anytime you think you may need emergency care. For example, call if:  · You have sudden, severe belly pain. · You vomit blood or what looks like coffee grounds. · You passed out (lost consciousness). · Your stools are maroon or very bloody. Call your doctor now or seek immediate medical care if:  · You are dizzy or lightheaded, or you feel like you may faint. · Your stools are black and look like tar, or they have streaks of blood. · You have belly pain. · You vomit or have nausea. · You have trouble swallowing, or it hurts when you swallow. Watch closely for changes in your health, and be sure to contact your doctor if:  · You do not get better as expected. Where can you learn more? Go to http://murali-alcira.info/. Enter E829 in the search box to learn more about \"Gastrointestinal Bleeding: Care Instructions. \"  Current as of: May 27, 2016  Content Version: 11.1  © 7236-8420 [employer]. Care instructions adapted under license by [employer] (which disclaims liability or warranty for this information). If you have questions about a medical condition or this instruction, always ask your healthcare professional. [address] any warranty or liability for your use of this information.

## 2017-02-11 NOTE — PROGRESS NOTES
Patient is resting in bed he denies having any pain or discomfort at this time. Will continue to monitor patient for changes in his condition. 0000 patient is resting comfortable in bed no complaints at this time. 0430 patient is resting in bed no complaints at this time blood drawn and sent to lab.    0630 patient resting in bed no complaints at this time. 0730 Bedside and Verbal shift change report given to Amari Berg RN  (oncoming nurse) by Pastor Jessica RN (offgoing nurse). Report included the following information SBAR, MAR, Recent Results and Med Rec Status.

## 2017-02-13 ENCOUNTER — OFFICE VISIT (OUTPATIENT)
Dept: INTERNAL MEDICINE CLINIC | Age: 67
End: 2017-02-13

## 2017-02-13 VITALS
HEIGHT: 70 IN | HEART RATE: 74 BPM | SYSTOLIC BLOOD PRESSURE: 99 MMHG | BODY MASS INDEX: 24.2 KG/M2 | RESPIRATION RATE: 18 BRPM | DIASTOLIC BLOOD PRESSURE: 67 MMHG | TEMPERATURE: 96.2 F | WEIGHT: 169 LBS | OXYGEN SATURATION: 99 %

## 2017-02-13 DIAGNOSIS — I10 ESSENTIAL HYPERTENSION: ICD-10-CM

## 2017-02-13 DIAGNOSIS — K27.4 PEPTIC ULCER WITH HEMORRHAGE: Primary | ICD-10-CM

## 2017-02-13 NOTE — LETTER
NOTIFICATION OF RETURN TO WORK / SCHOOL 
 
2/13/2017 11:43 AM 
 
Mr. Giuseppe [phone] [id number] [address]ormick To Whom It May Concern: 
 
Stephanie Claire. was under the care of [facility] He will be able to return to work/school on 2/17/2017 with regular duties and/or activities . If there are questions or concerns please have the patient contact our office.  
 
Sincerely, 
 
 
Samia Arellano MD

## 2017-02-13 NOTE — MR AVS SNAPSHOT
Visit Information Date & Time Provider Department Dept. Phone Encounter #  
 2/13/2017 11:30 AM Ruben Tuttle MD [facility] Internal Medicine [phone] [id number] Follow-up Instructions Return if symptoms worsen or fail to improve. Upcoming Health Maintenance Date Due  
 GLAUCOMA SCREENING Q2Y 1/31/2017 Pneumococcal 65+ Low/Medium Risk (1 of 2 - PCV13) 1/31/2017 MEDICARE YEARLY EXAM 1/31/2017 COLONOSCOPY 7/19/2017* DTaP/Tdap/Td series (2 - Td) 5/18/2025 *Topic was postponed. The date shown is not the original due date. Allergies as of 2/13/2017  Review Complete On: 2/13/2017 By: Winnie Whitfield MD  
 No Known Allergies Current Immunizations  Reviewed on 12/13/2016 Name Date Influenza High Dose Vaccine PF 12/13/2016 Influenza Vaccine 11/20/2015, 11/14/2014, 1/11/2013 Influenza Vaccine Split 10/28/2011 Tdap 5/18/2015 Not reviewed this visit You Were Diagnosed With   
  
 Codes Comments Peptic ulcer with hemorrhage    -  Primary ICD-10-CM: K27.4 ICD-9-CM: 533.40 Vitals BP Pulse Temp Resp Height(growth percentile) Weight(growth percentile) 99/67 (BP 1 Location: Left arm, BP Patient Position: Sitting) 74 96.2 °F (35.7 °C) (Oral) 18 5' 10\" (1.778 m) 169 lb (76.7 kg) SpO2 BMI Smoking Status 99% 24.25 kg/m2 Former Smoker Vitals History BMI and BSA Data Body Mass Index Body Surface Area  
 24.25 kg/m 2 1.95 m 2 Preferred Pharmacy Pharmacy Name Phone [id number] [place], [place] - [address] [place] [address] AT [address] [place] [address] [phone] Your Updated Medication List  
  
   
This list is accurate as of: 2/13/17 11:44 AM.  Always use your most recent med list.  
  
  
  
  
 bismuth subsalicylate 456 mg Tab Take 2 Tabs by mouth four (4) times daily for 10 days. buprenorphine-naloxone 8-2 mg Subl Commonly known as:  SUBOXONE  
2 Tabs by SubLINGual route daily. dilTIAZem  mg ER capsule Commonly known as:  CARDIZEM CD Take 1 Cap by mouth daily. doxycycline 100 mg tablet Commonly known as:  ADOXA Take 1 Tab by mouth two (2) times a day for 10 days. Indications: h pyolri FLONASE 50 mcg/actuation nasal spray Generic drug:  fluticasone 2 Sprays by Both Nostrils route daily as needed for Rhinitis or Allergies. Indications: ALLERGIC RHINITIS  
  
 metroNIDAZOLE 500 mg tablet Commonly known as:  FLAGYL Take 1 Tab by mouth three (3) times daily for 10 days. Indications: HELICOBACTER PYLORI GASTRITIS  
  
 multivitamin tablet Commonly known as:  ONE A DAY Take 1 Tab by mouth daily. pantoprazole 40 mg tablet Commonly known as:  PROTONIX Take 1 Tab by mouth Before breakfast and dinner. Script on chart from GI 40 mg twice daily for a total of 10 days then decrease to 40 mg daily  
  
 sucralfate 100 mg/mL suspension Commonly known as:  Flori Welsh Take 10 mL by mouth Before breakfast, lunch, dinner and at bedtime for 14 days. Indications: Duodenal Ulcer We Performed the Following CBC WITH AUTOMATED DIFF [73263 CPT(R)] Follow-up Instructions Return if symptoms worsen or fail to improve. Introducing \[facility]\"" & HEALTH SERVICES! Esme King introduces [employer] patient portal. Now you can access parts of your medical record, email your doctor's office, and request medication refills online. 1. In your internet browser, go to [email or web address] 2. Click on the First Time User? Click Here link in the Sign In box. You will see the New Member Sign Up page. 3. Enter your [employer] Access Code exactly as it appears below. You will not need to use this code after youve completed the sign-up process. If you do not sign up before the expiration date, you must request a new code. · [employer] Access Code: [email or web address] Expires: 3/13/2017 12:03 PM 
 
 4. Enter the last four digits of your Social Security Number (xxxx) and Date of Birth (mm/dd/yyyy) as indicated and click Submit. You will be taken to the next sign-up page. 5. Create a [employer] ID. This will be your [employer] login ID and cannot be changed, so think of one that is secure and easy to remember. 6. Create a [employer] password. You can change your password at any time. 7. Enter your Password Reset Question and Answer. This can be used at a later time if you forget your password. 8. Enter your e-mail address. You will receive e-mail notification when new information is available in [address] 9. Click Sign Up. You can now view and download portions of your medical record. 10. Click the Download Summary menu link to download a portable copy of your medical information. If you have questions, please visit the Frequently Asked Questions section of the [employer] website. Remember, [employer] is NOT to be used for urgent needs. For medical emergencies, dial 911. Now available from your iPhone and Android! Please provide this summary of care documentation to your next provider. Your primary care clinician is listed as Ruben Johnson. If you have any questions after today's visit, please call [phone].

## 2017-02-13 NOTE — PROGRESS NOTES
Subjective:     Chief Complaint   Patient presents with   [facility] Follow Up        He  is a 74y.o. year old male with h/o HTN who presents today for his recent hospital admission. with  He was admitted at [facility] for three days from 2/8- 2/10/2017 for UGI bleeding, low hemoglobin. EGD showed a bleeding peptic ulcer. Bleeding peptic ulcer was treated with hemostatic with clip and epinephrine. He was sent home on 2/10/2017 with quadirple therapy and sucralfate. He states that he is not having any hematochezia, hematemesis or abdominal pain. stool is still slightly dark but more lighter today. Has been taking his med regularly. He also reports that since the diagnosis he did not touch any alcohol and no plan on continuing. CAGE question answer was 0. He feels slight dizzy time to time otherwise feels fine. Component      Latest Ref Rng & Units 2/11/2017 2/10/2017           5:39 AM 12:39 AM   RBC      4.10 - 5.70 M/uL 2.57 (L) 2.59 (L)   HGB      12.1 - 17.0 g/dL 8.4 (L) 8.6 (L)   HCT      36.6 - 50.3 % 25.2 (L) 26.0 (L)     I reviewed hospital records . A comprehensive review of systems was negative except for that written in the HPI.   Objective:     Vitals:    02/13/17 1125   BP: 99/67   Pulse: 74   Resp: 18   Temp: 96.2 °F (35.7 °C)   TempSrc: Oral   SpO2: 99%   Weight: 169 lb (76.7 kg)   Height: 5' 10\" (1.778 m)       Physical Examination: General appearance - alert, well appearing, and in no distress, oriented to person, place, and time and normal appearing weight  Mental status - alert, oriented to person, place, and time, normal mood, behavior, speech, dress, motor activity, and thought processes  Chest - clear to auscultation, no wheezes, rales or rhonchi, symmetric air entry  Heart - normal rate, regular rhythm, normal S1, S2, no murmurs, rubs, clicks or gallops  Abdomen - soft, nontender, nondistended, no masses or organomegaly  Neurological - alert, oriented, normal speech, no focal findings or movement disorder noted    No Known Allergies   Social History     Social History    Marital status: SINGLE     Spouse name: N/A    Number of children: N/A    Years of education: N/A     Social History Main Topics    Smoking status: Former Smoker    Smokeless tobacco: Never Used      Comment: quit May 2010    Alcohol use 0.5 oz/week     1 Cans of beer per week      Comment: occasional beer.  Drug use: No      Comment: quit 2007    Sexual activity: Not Currently     Other Topics Concern    None     Social History Narrative      Family History   Problem Relation Age of Onset    Heart Disease Mother     Diabetes Father     Diabetes Sister     Diabetes Paternal Grandmother       Past Surgical History   Procedure Laterality Date    Hx appendectomy      Hx heent       T&A    Hx gi       surgical repair of bleeding ulcer    Hx gi       colon resection    Hx orthopaedic  2003 & 2005     left knee    Hx knee arthroscopy  1996     right knee      Past Medical History   Diagnosis Date    Hepatitis C     Hepatitis C 8/27/2010    Hypertension     PUD (peptic ulcer disease)     Seizures ([facility] [address]      as a child-none since ~1976      Current Outpatient Prescriptions   Medication Sig Dispense Refill    pantoprazole (PROTONIX) 40 mg tablet Take 1 Tab by mouth Before breakfast and dinner. Script on chart from GI  40 mg twice daily for a total of 10 days then decrease to 40 mg daily 60 Tab 0    metroNIDAZOLE (FLAGYL) 500 mg tablet Take 1 Tab by mouth three (3) times daily for 10 days. Indications: HELICOBACTER PYLORI GASTRITIS 30 Tab 0    bismuth subsalicylate 668 mg tab Take 2 Tabs by mouth four (4) times daily for 10 days. 80 Tab 0    doxycycline (ADOXA) 100 mg tablet Take 1 Tab by mouth two (2) times a day for 10 days. Indications: h pyolri 20 Tab 0    sucralfate (CARAFATE) 100 mg/mL suspension Take 10 mL by mouth Before breakfast, lunch, dinner and at bedtime for 14 days.  Indications: Duodenal Ulcer 560 mL 0    fluticasone (FLONASE) 50 mcg/actuation nasal spray 2 Sprays by Both Nostrils route daily as needed for Rhinitis or Allergies. Indications: ALLERGIC RHINITIS      dilTIAZem CD (CARDIZEM CD) 180 mg ER capsule Take 1 Cap by mouth daily. 90 Cap 1    buprenorphine-naloxone (SUBOXONE) 8-2 mg Subl 2 Tabs by SubLINGual route daily. (Patient taking differently: 1.5 Tabs by SubLINGual route daily.) 60 Tab 2    multivitamin (ONE A DAY) tablet Take 1 Tab by mouth daily. Assessment/ Plan:   Luis Gordon was seen today for hospital follow up. Diagnoses and all orders for this visit:    Peptic ulcer with hemorrhage  -    Continue quadruple therapy and sucralfate  -     CBC WITH AUTOMATED DIFF  -     Avoid NSAID, alcohol.   -    Make appointment with GI specialist Dr. Mary MELENDEZ. Essential hypertension         -   BP is low. He already took the morning dose of diltiazem . Patient is asymptomatic currently. Advised to check BP in the morning and skip taking BP med if BP is symptomatically low. He can restart taking the med according the number. Medication risks/benefits/costs/interactions/alternatives discussed with patient. Advised patient to call back or return to office if symptoms worsen/change/persist. If patient cannot reach us or should anything more severe/urgent arise he/she should proceed directly to the nearest emergency department. Discussed expected course/resolution/complications of diagnosis in detail with patient. Patient given a written after visit summary which includes her diagnoses, current medications and vitals. Patient expressed understanding with the diagnosis and plan. Follow-up Disposition:  Return if symptoms worsen or fail to improve.

## 2017-02-14 ENCOUNTER — TELEPHONE (OUTPATIENT)
Dept: INTERNAL MEDICINE CLINIC | Age: 67
End: 2017-02-14

## 2017-02-14 LAB
BASOPHILS # BLD AUTO: 0 X10E3/UL (ref 0–0.2)
BASOPHILS NFR BLD AUTO: 1 %
EOSINOPHIL # BLD AUTO: 0.2 X10E3/UL (ref 0–0.4)
EOSINOPHIL NFR BLD AUTO: 5 %
ERYTHROCYTE [DISTWIDTH] IN BLOOD BY AUTOMATED COUNT: 14 % (ref 12.3–15.4)
HCT VFR BLD AUTO: 24.3 % (ref 37.5–51)
HGB BLD-MCNC: 8.3 G/DL (ref 12.6–17.7)
IMM GRANULOCYTES # BLD: 0 X10E3/UL (ref 0–0.1)
IMM GRANULOCYTES NFR BLD: 0 %
LYMPHOCYTES # BLD AUTO: 1.6 X10E3/UL (ref 0.7–3.1)
LYMPHOCYTES NFR BLD AUTO: 44 %
MCH RBC QN AUTO: 32.8 PG (ref 26.6–33)
MCHC RBC AUTO-ENTMCNC: 34.2 G/DL (ref 31.5–35.7)
MCV RBC AUTO: 96 FL (ref 79–97)
MONOCYTES # BLD AUTO: 0.7 X10E3/UL (ref 0.1–0.9)
MONOCYTES NFR BLD AUTO: 19 %
NEUTROPHILS # BLD AUTO: 1.1 X10E3/UL (ref 1.4–7)
NEUTROPHILS NFR BLD AUTO: 31 %
PLATELET # BLD AUTO: 213 X10E3/UL (ref 150–379)
RBC # BLD AUTO: 2.53 X10E6/UL (ref 4.14–5.8)
WBC # BLD AUTO: 3.5 X10E3/UL (ref 3.4–10.8)

## 2017-02-14 NOTE — TELEPHONE ENCOUNTER
Pt called and stated that he forgot to talk to Dr. Danuta Tesfaye about how he was in the hospital from Wed.-Sat., he was told he had sleep apnia and he was told to have that checked out. Would like to talk to Dr. Danuta Tesfaye about that.

## 2017-02-15 NOTE — PROGRESS NOTES
Spoke with patient today and dicussed about the Hg level. He states that his stool is not dark as before, getting more lighter. No abdominal pain. Still feels sluggish. He did not check his BP as I have advised in last visit. BP was very low in that visit. I did ask him to skip taking his BP med if he continue to see very low BP with sluggish ness. He says he will check BP and skip taking Diltiazem if he needs to. He says otherwise he has been feeling fine. No soa, palpitation  He has an appointment with GI on 2/21/2017 for follow up EGD. I strongly advised to got to ER if he notice anything worsen. Candance Huger

## 2017-02-15 NOTE — TELEPHONE ENCOUNTER
I spoke with patient. He reports that while he was in the hospital he was told that he might have sleep apnea and he need to have a check up on that. His wife notice some apneic episode other that he usually does not snore that much. He will come tomorrow to get the sleep study referral.     He will also bring the short term disability form to fill out.

## 2017-04-18 ENCOUNTER — OFFICE VISIT (OUTPATIENT)
Dept: INTERNAL MEDICINE CLINIC | Age: 67
End: 2017-04-18

## 2017-04-18 VITALS
TEMPERATURE: 96.5 F | BODY MASS INDEX: 24.48 KG/M2 | SYSTOLIC BLOOD PRESSURE: 116 MMHG | OXYGEN SATURATION: 97 % | RESPIRATION RATE: 17 BRPM | DIASTOLIC BLOOD PRESSURE: 72 MMHG | HEIGHT: 70 IN | WEIGHT: 171 LBS | HEART RATE: 69 BPM

## 2017-04-18 DIAGNOSIS — D64.9 ANEMIA, UNSPECIFIED TYPE: ICD-10-CM

## 2017-04-18 DIAGNOSIS — S61.219A LACERATION OF FINGER, INITIAL ENCOUNTER: Primary | ICD-10-CM

## 2017-04-18 RX ORDER — ERGOCALCIFEROL 1.25 MG/1
CAPSULE ORAL
Refills: 5 | COMMUNITY
Start: 2017-04-02 | End: 2017-10-16 | Stop reason: SDUPTHER

## 2017-04-18 NOTE — PROGRESS NOTES
Subjective:     Chief Complaint   Patient presents with    Hand Laceration     cut right hand Sunday while cleaning grill        He  is a 74y.o. year old male with h/o HTN, peptic ulcer, hep C  who presents with a concern about a cut in his right hand 3 days ago while he was cleaning grill. It bled initially then stopped. Denies any pain, redness or oozing. He wanted to make sure that he does not need any stitches. He also mentioned that he had his endoscopy on 3/27/2017, found to have H.pylori. He has not talk with GI specialist DR. Jody House yet about this. He was treated for peptic ulcer and hemorrhage in 2/2017. Last Hg was 8.3. He denies any abdominal pain, dark stool or any blood in stool. Has been taking Protonix daily. Pertinent items are noted in HPI. Objective:     Vitals:    04/18/17 1009   BP: 116/72   Pulse: 69   Resp: 17   Temp: 96.5 °F (35.8 °C)   TempSrc: Oral   SpO2: 97%   Weight: 171 lb (77.6 kg)   Height: 5' 10\" (1.778 m)       Physical Examination: General appearance - alert, well appearing, and in no distress, oriented to person, place, and time and normal appearing weight  Mental status - alert, oriented to person, place, and time, normal mood, behavior, speech, dress, motor activity, and thought processes  Skin - there is a 1 inch superficial clean cut linear aligned laceration in his palmar aspect of right thumb. No redness, non tender, no oozing. No Known Allergies   Social History     Social History    Marital status: SINGLE     Spouse name: N/A    Number of children: N/A    Years of education: N/A     Social History Main Topics    Smoking status: Former Smoker    Smokeless tobacco: Never Used      Comment: quit May 2010    Alcohol use 0.5 oz/week     1 Cans of beer per week      Comment: occasional beer.      Drug use: No      Comment: quit 2007    Sexual activity: Not Currently     Other Topics Concern    None     Social History Narrative      Family History Problem Relation Age of Onset    Heart Disease Mother     Diabetes Father     Diabetes Sister     Diabetes Paternal Grandmother       Past Surgical History:   Procedure Laterality Date    HX APPENDECTOMY      HX GI      surgical repair of bleeding ulcer    HX GI      colon resection    HX HEENT      T&A    HX KNEE ARTHROSCOPY  1996    right knee    HX ORTHOPAEDIC  2003 & 2005    left knee      Past Medical History:   Diagnosis Date    Hepatitis C     Hepatitis C 8/27/2010    Hypertension     PUD (peptic ulcer disease)     Seizures ([address]     as a child-none since ~1976      Current Outpatient Prescriptions   Medication Sig Dispense Refill    ergocalciferol (ERGOCALCIFEROL) 50,000 unit capsule TAKE 1 CAPSULE BY MOUTH ONCE A WEEK  5    pantoprazole (PROTONIX) 40 mg tablet Take 1 Tab by mouth Before breakfast and dinner. Script on chart from GI  40 mg twice daily for a total of 10 days then decrease to 40 mg daily (Patient taking differently: Take 40 mg by mouth daily. Script on chart from GI  40 mg twice daily for a total of 10 days then decrease to 40 mg daily) 60 Tab 0    fluticasone (FLONASE) 50 mcg/actuation nasal spray 2 Sprays by Both Nostrils route daily as needed for Rhinitis or Allergies. Indications: ALLERGIC RHINITIS      dilTIAZem CD (CARDIZEM CD) 180 mg ER capsule Take 1 Cap by mouth daily. 90 Cap 1    buprenorphine-naloxone (SUBOXONE) 8-2 mg Subl 2 Tabs by SubLINGual route daily. (Patient taking differently: 1.5 Tabs by SubLINGual route daily.) 60 Tab 2    multivitamin (ONE A DAY) tablet Take 1 Tab by mouth daily. Assessment/ Plan:   Alyson Charles was seen today for hand laceration. Diagnoses and all orders for this visit:    Laceration of finger, initial encounter       - assured that he does not need any stitches. Advised to keep it clean and dry. Can use Neosporin.  Discussed about warning signs of infection and when to seek medical attanetion  Anemia, unspecified type  - CBC WITH AUTOMATED DIFF  -     Strongly advised him call his GI for the appropriate treatment for H.Pylori. Medication risks/benefits/costs/interactions/alternatives discussed with patient. Advised patient to call back or return to office if symptoms worsen/change/persist. If patient cannot reach us or should anything more severe/urgent arise he/she should proceed directly to the nearest emergency department. Discussed expected course/resolution/complications of diagnosis in detail with patient. Patient given a written after visit summary which includes her diagnoses, current medications and vitals. Patient expressed understanding with the diagnosis and plan. Follow-up Disposition:  Return if symptoms worsen or fail to improve.

## 2017-04-18 NOTE — PATIENT INSTRUCTIONS
Cuts: Care Instructions  Your Care Instructions  A cut can happen anywhere on your body. Stitches, staples, skin adhesives, or pieces of tape called Steri-Strips are sometimes used to keep the edges of a cut together and help it heal. Steri-Strips can be used by themselves or with stitches or staples. Sometimes cuts are left open. If the cut went deep and through the skin, the doctor may have closed the cut in two layers. A deeper layer of stitches brings the deep part of the cut together. These stitches will dissolve and don't need to be removed. The upper layer closure, which could be stitches, staples, Steri-Strips, or adhesive, is what you see on the cut. A cut is often covered by a bandage. The doctor has checked you carefully, but problems can develop later. If you notice any problems or new symptoms, get medical treatment right away. Follow-up care is a key part of your treatment and safety. Be sure to make and go to all appointments, and call your doctor if you are having problems. It's also a good idea to know your test results and keep a list of the medicines you take. How can you care for yourself at home? If a cut is open or closed  · Prop up the sore area on a pillow anytime you sit or lie down during the next 3 days. Try to keep it above the level of your heart. This will help reduce swelling. · Keep the cut dry for the first 24 to 48 hours. After this, you can shower if your doctor okays it. Pat the cut dry. · Don't soak the cut, such as in a bathtub. Your doctor will tell you when it's safe to get the cut wet. · After the first 24 to 48 hours, clean the cut with soap and water 2 times a day unless your doctor gives you different instructions. ¨ Don't use hydrogen peroxide or alcohol, which can slow healing. ¨ You may cover the cut with a thin layer of petroleum jelly and a nonstick bandage.   ¨ If the doctor put a bandage over the cut, put on a new bandage after cleaning the cut or if the bandage gets wet or dirty. · Avoid any activity that could cause your cut to reopen. · Be safe with medicines. Read and follow all instructions on the label. ¨ If the doctor gave you a prescription medicine for pain, take it as prescribed. ¨ If you are not taking a prescription pain medicine, ask your doctor if you can take an over-the-counter medicine. If the cut is closed with stitches, staples, or Steri-Strips  · Follow the above instructions for open or closed cuts. · Do not remove the stitches or staples on your own. Your doctor will tell you when to come back to have the stitches or staples removed. · Leave Steri-Strips on until they fall off. If the cut is closed with a skin adhesive  · Follow the above instructions for open or closed cuts. · Leave the skin adhesive on your skin until it falls off on its own. This may take 5 to 10 days. · Do not scratch, rub, or pick at the adhesive. · Do not put the sticky part of a bandage directly on the adhesive. · Do not put any kind of ointment, cream, or lotion over the area. This can make the adhesive fall off too soon. Do not use hydrogen peroxide or alcohol, which can slow healing. When should you call for help? Call your doctor now or seek immediate medical care if:  · You have new pain, or your pain gets worse. · The skin near the cut is cold or pale or changes color. · You have tingling, weakness, or numbness near the cut. · The cut starts to bleed, and blood soaks through the bandage. Oozing small amounts of blood is normal.  · You have trouble moving the area near the cut. · You have symptoms of infection, such as:  ¨ Increased pain, swelling, warmth, or redness around the cut. ¨ Red streaks leading from the cut. ¨ Pus draining from the cut. ¨ A fever. Watch closely for changes in your health, and be sure to contact your doctor if:  · The cut reopens. · You do not get better as expected. Where can you learn more?   Go to http://murali-alcira.info/. Enter M735 in the search box to learn more about \"Cuts: Care Instructions. \"  Current as of: May 27, 2016  Content Version: 11.2  © 2701-0054 [employer]. Care instructions adapted under license by [employer] (which disclaims liability or warranty for this information). If you have questions about a medical condition or this instruction, always ask your healthcare professional. [place] [address] any warranty or liability for your use of this information.

## 2017-04-18 NOTE — MR AVS SNAPSHOT
Visit Information Date & Time Provider Department Dept. Phone Encounter #  
 4/18/2017 10:00 AM Ruben Thapa MD [facility] [phone] [id number] Follow-up Instructions Return if symptoms worsen or fail to improve. Upcoming Health Maintenance Date Due  
 GLAUCOMA SCREENING Q2Y 1/31/2017 Pneumococcal 65+ Low/Medium Risk (1 of 2 - PCV13) 1/31/2017 MEDICARE YEARLY EXAM 1/31/2017 COLONOSCOPY 7/19/2017* DTaP/Tdap/Td series (2 - Td) 5/18/2025 *Topic was postponed. The date shown is not the original due date. Allergies as of 4/18/2017  Review Complete On: 4/18/2017 By: Kathleen Acuna LPN No Known Allergies Current Immunizations  Reviewed on 12/13/2016 Name Date Influenza High Dose Vaccine PF 12/13/2016 Influenza Vaccine 11/20/2015, 11/14/2014, 1/11/2013 Influenza Vaccine Split 10/28/2011 Tdap 5/18/2015 Not reviewed this visit You Were Diagnosed With   
  
 Codes Comments Anemia, unspecified type    -  Primary ICD-10-CM: D64.9 ICD-9-CM: 285.9 Laceration of finger, initial encounter     ICD-10-CM: Z37.517F 
ICD-9-CM: 044. 0 Vitals BP Pulse Temp Resp Height(growth percentile) Weight(growth percentile) 116/72 (BP 1 Location: Left arm, BP Patient Position: Sitting) 69 96.5 °F (35.8 °C) (Oral) 17 5' 10\" (1.778 m) 171 lb (77.6 kg) SpO2 BMI Smoking Status 97% 24.54 kg/m2 Former Smoker Vitals History BMI and BSA Data Body Mass Index Body Surface Area 24.54 kg/m 2 1.96 m 2 Preferred Pharmacy Pharmacy Name Phone [facility]/PH[facility]ACY [facility][id number] [address] [facility] AT [address] [place] [address] [phone] Your Updated Medication List  
  
   
This list is accurate as of: 4/18/17 10:41 AM.  Always use your most recent med list.  
  
  
  
  
 buprenorphine-naloxone 8-2 mg Subl Commonly known as:  SUBOXONE  
2 Tabs by SubLINGual route daily. dilTIAZem  mg ER capsule Commonly known as:  CARDIZEM CD Take 1 Cap by mouth daily. ergocalciferol 50,000 unit capsule Commonly known as:  ERGOCALCIFEROL  
TAKE 1 CAPSULE BY MOUTH ONCE A WEEK FLONASE 50 mcg/actuation nasal spray Generic drug:  fluticasone 2 Sprays by Both Nostrils route daily as needed for Rhinitis or Allergies. Indications: ALLERGIC RHINITIS  
  
 multivitamin tablet Commonly known as:  ONE A DAY Take 1 Tab by mouth daily. pantoprazole 40 mg tablet Commonly known as:  PROTONIX Take 1 Tab by mouth Before breakfast and dinner. Script on chart from GI 40 mg twice daily for a total of 10 days then decrease to 40 mg daily We Performed the Following CBC WITH AUTOMATED DIFF [98575 CPT(R)] Follow-up Instructions Return if symptoms worsen or fail to improve. Patient Instructions Cuts: Care Instructions Your Care Instructions A cut can happen anywhere on your body. Stitches, staples, skin adhesives, or pieces of tape called Steri-Strips are sometimes used to keep the edges of a cut together and help it heal. Steri-Strips can be used by themselves or with stitches or staples. Sometimes cuts are left open. If the cut went deep and through the skin, the doctor may have closed the cut in two layers. A deeper layer of stitches brings the deep part of the cut together. These stitches will dissolve and don't need to be removed. The upper layer closure, which could be stitches, staples, Steri-Strips, or adhesive, is what you see on the cut. A cut is often covered by a bandage. The doctor has checked you carefully, but problems can develop later. If you notice any problems or new symptoms, get medical treatment right away. Follow-up care is a key part of your treatment and safety.  Be sure to make and go to all appointments, and call your doctor if you are having problems. It's also a good idea to know your test results and keep a list of the medicines you take. How can you care for yourself at home? If a cut is open or closed · Prop up the sore area on a pillow anytime you sit or lie down during the next 3 days. Try to keep it above the level of your heart. This will help reduce swelling. · Keep the cut dry for the first 24 to 48 hours. After this, you can shower if your doctor okays it. Pat the cut dry. · Don't soak the cut, such as in a bathtub. Your doctor will tell you when it's safe to get the cut wet. · After the first 24 to 48 hours, clean the cut with soap and water 2 times a day unless your doctor gives you different instructions. ¨ Don't use hydrogen peroxide or alcohol, which can slow healing. ¨ You may cover the cut with a thin layer of petroleum jelly and a nonstick bandage. ¨ If the doctor put a bandage over the cut, put on a new bandage after cleaning the cut or if the bandage gets wet or dirty. · Avoid any activity that could cause your cut to reopen. · Be safe with medicines. Read and follow all instructions on the label. ¨ If the doctor gave you a prescription medicine for pain, take it as prescribed. ¨ If you are not taking a prescription pain medicine, ask your doctor if you can take an over-the-counter medicine. If the cut is closed with stitches, staples, or Steri-Strips · Follow the above instructions for open or closed cuts. · Do not remove the stitches or staples on your own. Your doctor will tell you when to come back to have the stitches or staples removed. · Leave Steri-Strips on until they fall off. If the cut is closed with a skin adhesive · Follow the above instructions for open or closed cuts. · Leave the skin adhesive on your skin until it falls off on its own. This may take 5 to 10 days. · Do not scratch, rub, or pick at the adhesive. · Do not put the sticky part of a bandage directly on the adhesive. · Do not put any kind of ointment, cream, or lotion over the area. This can make the adhesive fall off too soon. Do not use hydrogen peroxide or alcohol, which can slow healing. When should you call for help? Call your doctor now or seek immediate medical care if: 
· You have new pain, or your pain gets worse. · The skin near the cut is cold or pale or changes color. · You have tingling, weakness, or numbness near the cut. · The cut starts to bleed, and blood soaks through the bandage. Oozing small amounts of blood is normal. 
· You have trouble moving the area near the cut. · You have symptoms of infection, such as: 
¨ Increased pain, swelling, warmth, or redness around the cut. ¨ Red streaks leading from the cut. ¨ Pus draining from the cut. ¨ A fever. Watch closely for changes in your health, and be sure to contact your doctor if: · The cut reopens. · You do not get better as expected. Where can you learn more? Go to http://murali-alcira.info/. Enter M735 in the search box to learn more about \"Cuts: Care Instructions. \" Current as of: May 27, 2016 Content Version: 11.2 © 1151-2158 OrderWithMe. Care instructions adapted under license by [employer] (which disclaims liability or warranty for this information). If you have questions about a medical condition or this instruction, always ask your healthcare professional. [place] [address] any warranty or liability for your use of this information. Introducing [facility] & HEALTH SERVICES! Jose Patino introduces Gust patient portal. Now you can access parts of your medical record, email your doctor's office, and request medication refills online. 1. In your internet browser, go to [email or web address]. [email or web address]. [employer] Switch[employer]Novitazt 2. Click on the First Time User? Click Here link in the Sign In box. You will see the New Member Sign Up page. 3. Enter your [employer] Access Code exactly as it appears below. You will not need to use this code after youve completed the sign-up process. If you do not sign up before the expiration date, you must request a new code. · [employer] Access Code: [email or web address] Expires: 6/24/2017  4:29 PM 
 
4. Enter the last four digits of your Social Security Number (xxxx) and Date of Birth (mm/dd/yyyy) as indicated and click Submit. You will be taken to the next sign-up page. 5. Create a [employer] ID. This will be your [employer] login ID and cannot be changed, so think of one that is secure and easy to remember. 6. Create a [employer] password. You can change your password at any time. 7. Enter your Password Reset Question and Answer. This can be used at a later time if you forget your password. 8. Enter your e-mail address. You will receive e-mail notification when new information is available in [address]. 9. Click Sign Up. You can now view and download portions of your medical record. 10. Click the Download Summary menu link to download a portable copy of your medical information. If you have questions, please visit the Frequently Asked Questions section of the [employer] website. Remember, [employer] is NOT to be used for urgent needs. For medical emergencies, dial 911. Now available from your iPhone and Android! Please provide this summary of care documentation to your next provider. Your primary care clinician is listed as Ruben Johnson. If you have any questions after today's visit, please call [phone].

## 2017-04-18 NOTE — LETTER
4/19/2017 4:23 PM 
 
Mr. Adam [address] [id number] Dear Solange Buchanan.: 
 
Please find your most recent results below. Resulted Orders CBC WITH AUTOMATED DIFF Result Value Ref Range WBC 3.4 3.4 - 10.8 x10E3/uL  
 RBC 4.09 (L) 4.14 - 5.80 x10E6/uL HGB 13.6 12.6 - 17.7 g/dL HCT 41.3 37.5 - 51.0 %  (H) 79 - 97 fL  
 MCH 33.3 (H) 26.6 - 33.0 pg  
 MCHC 32.9 31.5 - 35.7 g/dL  
 RDW 13.0 12.3 - 15.4 % PLATELET 365 350 - 146 x10E3/uL NEUTROPHILS 37 % Lymphocytes 41 % MONOCYTES 13 % EOSINOPHILS 8 % BASOPHILS 1 %  
 ABS. NEUTROPHILS 1.3 (L) 1.4 - 7.0 x10E3/uL Abs Lymphocytes 1.4 0.7 - 3.1 x10E3/uL  
 ABS. MONOCYTES 0.5 0.1 - 0.9 x10E3/uL  
 ABS. EOSINOPHILS 0.3 0.0 - 0.4 x10E3/uL  
 ABS. BASOPHILS 0.0 0.0 - 0.2 x10E3/uL IMMATURE GRANULOCYTES 0 %  
 ABS. IMM. GRANS. 0.0 0.0 - 0.1 x10E3/uL Narrative Performed at:  [address]  [id number] : Tara Zuluaga MD, Phone:  [phone] RECOMMENDATIONS: 
 
Please call him to let him know that hemoglobin level is back to normal range. Please call me if you have any questions: [phone] Sincerely, 
 
 
Jake Silva MD

## 2017-04-19 LAB
BASOPHILS # BLD AUTO: 0 X10E3/UL (ref 0–0.2)
BASOPHILS NFR BLD AUTO: 1 %
EOSINOPHIL # BLD AUTO: 0.3 X10E3/UL (ref 0–0.4)
EOSINOPHIL NFR BLD AUTO: 8 %
ERYTHROCYTE [DISTWIDTH] IN BLOOD BY AUTOMATED COUNT: 13 % (ref 12.3–15.4)
HCT VFR BLD AUTO: 41.3 % (ref 37.5–51)
HGB BLD-MCNC: 13.6 G/DL (ref 12.6–17.7)
IMM GRANULOCYTES # BLD: 0 X10E3/UL (ref 0–0.1)
IMM GRANULOCYTES NFR BLD: 0 %
LYMPHOCYTES # BLD AUTO: 1.4 X10E3/UL (ref 0.7–3.1)
LYMPHOCYTES NFR BLD AUTO: 41 %
MCH RBC QN AUTO: 33.3 PG (ref 26.6–33)
MCHC RBC AUTO-ENTMCNC: 32.9 G/DL (ref 31.5–35.7)
MCV RBC AUTO: 101 FL (ref 79–97)
MONOCYTES # BLD AUTO: 0.5 X10E3/UL (ref 0.1–0.9)
MONOCYTES NFR BLD AUTO: 13 %
NEUTROPHILS # BLD AUTO: 1.3 X10E3/UL (ref 1.4–7)
NEUTROPHILS NFR BLD AUTO: 37 %
PLATELET # BLD AUTO: 201 X10E3/UL (ref 150–379)
RBC # BLD AUTO: 4.09 X10E6/UL (ref 4.14–5.8)
WBC # BLD AUTO: 3.4 X10E3/UL (ref 3.4–10.8)

## 2017-06-08 ENCOUNTER — DOCUMENTATION ONLY (OUTPATIENT)
Dept: INTERNAL MEDICINE CLINIC | Age: 67
End: 2017-06-08

## 2017-06-08 ENCOUNTER — OFFICE VISIT (OUTPATIENT)
Dept: INTERNAL MEDICINE CLINIC | Age: 67
End: 2017-06-08

## 2017-06-08 VITALS
BODY MASS INDEX: 24.6 KG/M2 | HEIGHT: 70 IN | WEIGHT: 171.8 LBS | RESPIRATION RATE: 17 BRPM | TEMPERATURE: 96.9 F | DIASTOLIC BLOOD PRESSURE: 77 MMHG | SYSTOLIC BLOOD PRESSURE: 115 MMHG | OXYGEN SATURATION: 96 % | HEART RATE: 63 BPM

## 2017-06-08 DIAGNOSIS — Z00.00 WELL ADULT ON ROUTINE HEALTH CHECK: Primary | ICD-10-CM

## 2017-06-08 DIAGNOSIS — Z23 ENCOUNTER FOR IMMUNIZATION: ICD-10-CM

## 2017-06-08 DIAGNOSIS — Z11.59 ENCOUNTER FOR HEPATITIS C VIRUS SCREENING TEST FOR HIGH RISK PATIENT: ICD-10-CM

## 2017-06-08 DIAGNOSIS — I10 ESSENTIAL HYPERTENSION: ICD-10-CM

## 2017-06-08 DIAGNOSIS — B35.1 ONYCHOMYCOSIS OF TOENAIL: ICD-10-CM

## 2017-06-08 DIAGNOSIS — Z91.89 ENCOUNTER FOR HEPATITIS C VIRUS SCREENING TEST FOR HIGH RISK PATIENT: ICD-10-CM

## 2017-06-08 DIAGNOSIS — E55.9 VITAMIN D DEFICIENCY: ICD-10-CM

## 2017-06-08 NOTE — MR AVS SNAPSHOT
Visit Information Date & Time Provider Department Dept. Phone Encounter #  
 6/8/2017  8:30 AM Ruben Olmedo MD [facility] [phone] [id number] Follow-up Instructions Return in about 6 months (around 12/8/2017). Upcoming Health Maintenance Date Due  
 GLAUCOMA SCREENING Q2Y 1/31/2017 Pneumococcal 65+ Low/Medium Risk (1 of 2 - PCV13) 1/31/2017 MEDICARE YEARLY EXAM 1/31/2017 COLONOSCOPY 7/19/2017* INFLUENZA AGE 9 TO ADULT 8/1/2017 DTaP/Tdap/Td series (2 - Td) 5/18/2025 *Topic was postponed. The date shown is not the original due date. Allergies as of 6/8/2017  Review Complete On: 6/8/2017 By: Claudean Silk, MD  
 No Known Allergies Current Immunizations  Reviewed on 6/8/2017 Name Date Influenza High Dose Vaccine PF 12/13/2016 Influenza Vaccine 11/20/2015, 11/14/2014, 1/11/2013 Influenza Vaccine Split 10/28/2011 Pneumococcal Conjugate (PCV-13)  Incomplete Tdap 5/18/2015 Reviewed by Claudean Silk, MD on 6/8/2017 at  8:58 AM  
 Reviewed by Claudean Silk, MD on 6/8/2017 at  9:03 AM  
You Were Diagnosed With   
  
 Codes Comments Well adult on routine health check    -  Primary ICD-10-CM: Z00.00 ICD-9-CM: V70.0 Vitamin D deficiency     ICD-10-CM: E55.9 ICD-9-CM: 268.9 Encounter for immunization     ICD-10-CM: R09 ICD-9-CM: V03.89 Onychomycosis of toenail     ICD-10-CM: B35.1 ICD-9-CM: 110.1 Encounter for hepatitis C virus screening test for high risk patient     ICD-10-CM: Z11.59, Z91.89 ICD-9-CM: V73.89 Vitals BP Pulse Temp Resp Height(growth percentile) Weight(growth percentile) 115/77 (BP 1 Location: Left arm, BP Patient Position: Sitting) 63 96.9 °F (36.1 °C) (Oral) 17 5' 10\" (1.778 m) 171 lb 12.8 oz (77.9 kg) SpO2 BMI Smoking Status 96% 24.65 kg/m2 Former Smoker Vitals History BMI and BSA Data Body Mass Index Body Surface Area 24.65 kg/m 2 1.96 m 2 Preferred Pharmacy Pharmacy Name Phone [facility]/PHARMACY #[id number]- [address] AT [address] [place] [address] [phone] Your Updated Medication List  
  
   
This list is accurate as of: 6/8/17  9:19 AM.  Always use your most recent med list.  
  
  
  
  
 buprenorphine-naloxone 8-2 mg Subl Commonly known as:  SUBOXONE  
2 Tabs by SubLINGual route daily. dilTIAZem  mg ER capsule Commonly known as:  CARDIZEM CD Take 1 Cap by mouth daily.  
  
 efinaconazole Yareli topical solution Commonly known as:  Winston Wang Use daily. ergocalciferol 50,000 unit capsule Commonly known as:  ERGOCALCIFEROL  
TAKE 1 CAPSULE BY MOUTH ONCE A WEEK FLONASE 50 mcg/actuation nasal spray Generic drug:  fluticasone 2 Sprays by Both Nostrils route daily as needed for Rhinitis or Allergies. Indications: ALLERGIC RHINITIS  
  
 multivitamin tablet Commonly known as:  ONE A DAY Take 1 Tab by mouth daily. pantoprazole 40 mg tablet Commonly known as:  PROTONIX Take 1 Tab by mouth Before breakfast and dinner. Script on chart from GI 40 mg twice daily for a total of 10 days then decrease to 40 mg daily Prescriptions Sent to Pharmacy Refills  
 efinaconazole (JUBLIA) yareli topical solution 11 Sig: Use daily. Class: Normal  
 Pharmacy: [facility]/pharmacy #[id number] AT [address] [place] [address] Ph #: [phone] We Performed the Following CBC WITH AUTOMATED DIFF [02604 CPT(R)] HEMOGLOBIN A1C WITH EAG [19937 CPT(R)] HEPATITIS C AB [03677 CPT(R)] LIPID PANEL [80266 CPT(R)] METABOLIC PANEL, COMPREHENSIVE [03178 CPT(R)] PNEUMOCOCCAL CONJ VACCINE 13 VALENT IM [id number] CPT(R)] PSA W/ REFLX FREE PSA [97605 CPT(R)] TSH AND FREE T4 [52391 CPT(R)] UA/M W/RFLX CULTURE, COMP [92413 CPT(R)] VITAMIN D, 25 HYDROXY [id number] CPT(R)] Follow-up Instructions Return in about 6 months (around 12/8/2017). Patient Instructions Well Visit, Over 72: Care Instructions Your Care Instructions Physical exams can help you stay healthy. Your doctor has checked your overall health and may have suggested ways to take good care of yourself. He or she also may have recommended tests. At home, you can help prevent illness with healthy eating, regular exercise, and other steps. Follow-up care is a key part of your treatment and safety. Be sure to make and go to all appointments, and call your doctor if you are having problems. It's also a good idea to know your test results and keep a list of the medicines you take. How can you care for yourself at home? · Reach and stay at a healthy weight. This will lower your risk for many problems, such as obesity, diabetes, heart disease, and high blood pressure. · Get at least 30 minutes of exercise on most days of the week. Walking is a good choice. You also may want to do other activities, such as running, swimming, cycling, or playing tennis or team sports. · Do not smoke. Smoking can make health problems worse. If you need help quitting, talk to your doctor about stop-smoking programs and medicines. These can increase your chances of quitting for good. · Protect your skin from too much sun. When you're outdoors from 10 a.m. to 4 p.m., stay in the shade or cover up with clothing and a hat with a wide brim. Wear sunglasses that block UV rays. Even when it's cloudy, put broad-spectrum sunscreen (SPF 30 or higher) on any exposed skin. · See a dentist one or two times a year for checkups and to have your teeth cleaned. · Wear a seat belt in the car. · Limit alcohol to 2 drinks a day for men and 1 drink a day for women. Too much alcohol can cause health problems. Follow your doctor's advice about when to have certain tests. These tests can spot problems early. For men and women · Cholesterol. Your doctor will tell you how often to have this done based on your overall health and other things that can increase your risk for heart attack and stroke. · Blood pressure. Have your blood pressure checked during a routine doctor visit. Your doctor will tell you how often to check your blood pressure based on your age, your blood pressure results, and other factors. · Diabetes. Ask your doctor whether you should have tests for diabetes. · Vision. Experts recommend that you have yearly exams for glaucoma and other age-related eye problems. · Hearing. Tell your doctor if you notice any change in your hearing. You can have tests to find out how well you hear. · Colon cancer tests. Keep having colon cancer tests as your doctor recommends. You can have one of several types of tests. · Heart attack and stroke risk. At least every 4 to 6 years, you should have your risk for heart attack and stroke assessed. Your doctor uses factors such as your age, blood pressure, cholesterol, and whether you smoke or have diabetes to show what your risk for a heart attack or stroke is over the next 10 years. · Osteoporosis. Talk to your doctor about whether you should have a bone density test to find out whether you have thinning bones. Also ask your doctor about whether you should take calcium and vitamin D supplements. For women · Pap test and pelvic exam. You may no longer need a Pap test. Talk with your doctor about whether to stop or continue to have Pap tests. · Breast exam and mammogram. Ask how often you should have a mammogram, which is an X-ray of your breasts. A mammogram can spot breast cancer before it can be felt and when it is easiest to treat. · Thyroid disease. Talk to your doctor about whether to have your thyroid checked as part of a regular physical exam. Women have an increased chance of a thyroid problem. For men · Prostate exam. Talk to your doctor about whether you should have a blood test (called a PSA test) for prostate cancer. Experts disagree on whether men should have this test. Some experts recommend that you discuss the benefits and risks of the test with your doctor. · Abdominal aortic aneurysm. Ask your doctor whether you should have a test to check for an aneurysm. You may need a test if you ever smoked or if your parent, brother, sister, or child has had an aneurysm. When should you call for help? Watch closely for changes in your health, and be sure to contact your doctor if you have any problems or symptoms that concern you. Where can you learn more? Go to http://murali-alcira.info/. Enter C828 in the search box to learn more about \"Well Visit, Over 65: Care Instructions. \" Current as of: July 19, 2016 Content Version: 11.2 © 4327-2362 Beam.. Care instructions adapted under license by MyLorry (which disclaims liability or warranty for this information). If you have questions about a medical condition or this instruction, always ask your healthcare professional. [address] any warranty or liability for your use of this information. Introducing [facility] & [facility]! Stephani Kendall introduces ZeroMail patient portal. Now you can access parts of your medical record, email your doctor's office, and request medication refills online. 1. In your internet browser, go to [email or web address] 2. Click on the First Time User? Click Here link in the Sign In box. You will see the New Member Sign Up page. 3. Enter your ZeroMail Access Code exactly as it appears below. You will not need to use this code after youve completed the sign-up process. If you do not sign up before the expiration date, you must request a new code. · Zero[employer] Access Code: [email or web address] Expires: 6/24/2017  4:29 PM 
 
4.  Enter the last four digits of your Social Security Number (xxxx) and Date of Birth (mm/dd/yyyy) as indicated and click Submit. You will be taken to the next sign-up page. 5. Create a "[employer] ID. This will be your "[employer] login ID and cannot be changed, so think of one that is secure and easy to remember. 6. Create a "[employer] password. You can change your password at any time. 7. Enter your Password Reset Question and Answer. This can be used at a later time if you forget your password. 8. Enter your e-mail address. You will receive e-mail notification when new information is available in [address] 9. Click Sign Up. You can now view and download portions of your medical record. 10. Click the Download Summary menu link to download a portable copy of your medical information. If you have questions, please visit the Frequently Asked Questions section of the "[employer] website. Remember, "[employer] is NOT to be used for urgent needs. For medical emergencies, dial 911. Now available from your iPhone and Android! Please provide this summary of care documentation to your next provider. Your primary care clinician is listed as Ruben Johnson. If you have any questions after today's visit, please call [phone].

## 2017-06-08 NOTE — PATIENT INSTRUCTIONS

## 2017-06-08 NOTE — PROGRESS NOTES
Subjective:   Maryann Harrington. is a 74 y.o. male presenting for his annual checkup. His past medical history is significant for HTN, vit D deficiency, GI bleeding and Hep C. He reports that Hep C was treated in 2000. Currently taking once a week vit D. Lab Results   Component Value Date/Time    VITAMIN D, 25-HYDROXY 43.9 12/13/2016 12:05 PM           ROS:  Feeling well. No dyspnea or chest pain on exertion. No abdominal pain, change in bowel habits, black or bloody stools. No urinary tract or prostatic symptoms. No neurological complaints. Specific concerns today: none. 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 8 mL 11    ergocalciferol (ERGOCALCIFEROL) 50,000 unit capsule TAKE 1 CAPSULE BY MOUTH ONCE A WEEK  5    pantoprazole (PROTONIX) 40 mg tablet Take 1 Tab by mouth Before breakfast and dinner. Script on chart from GI  40 mg twice daily for a total of 10 days then decrease to 40 mg daily (Patient taking differently: Take 40 mg by mouth daily. Script on chart from GI  40 mg twice daily for a total of 10 days then decrease to 40 mg daily) 60 Tab 0    dilTIAZem CD (CARDIZEM CD) 180 mg ER capsule Take 1 Cap by mouth daily. 90 Cap 1    buprenorphine-naloxone (SUBOXONE) 8-2 mg Subl 2 Tabs by SubLINGual route daily. (Patient taking differently: 1.5 Tabs by SubLINGual route daily.) 60 Tab 2    multivitamin (ONE A DAY) tablet Take 1 Tab by mouth daily.  fluticasone (FLONASE) 50 mcg/actuation nasal spray 2 Sprays by Both Nostrils route daily as needed for Rhinitis or Allergies.  Indications: ALLERGIC RHINITIS       No Known Allergies  Past Medical History: Diagnosis Date    Hepatitis C     Hepatitis C 8/27/2010    Hypertension     PUD (peptic ulcer disease)     Seizures (HCC)     as a child-none since ~1976     Past Surgical History:   Procedure Laterality Date    HX APPENDECTOMY      HX GI      surgical repair of bleeding ulcer    HX GI      colon resection    HX HEENT      T&A    HX KNEE ARTHROSCOPY  1996    right knee    HX ORTHOPAEDIC  2003 & 2005    left knee     Family History   Problem Relation Age of Onset    Heart Disease Mother     Diabetes Father     Diabetes Sister     Diabetes Paternal Grandmother      Social History   Substance Use Topics    Smoking status: Former Smoker    Smokeless tobacco: Never Used      Comment: quit May 2010    Alcohol use 0.5 oz/week     1 Cans of beer per week      Comment: occasional beer. Lab Results  Component Value Date/Time   Cholesterol, total 249 06/08/2016 07:47 AM   HDL Cholesterol 168 06/08/2016 07:47 AM   LDL, calculated 68 06/08/2016 07:47 AM   Triglyceride 67 06/08/2016 07:47 AM       Lab Results  Component Value Date/Time   ALT (SGPT) 61 02/08/2017 05:34 AM   AST (SGOT) 72 02/08/2017 05:34 AM   Alk. phosphatase 43 02/08/2017 05:34 AM   Bilirubin, total 0.5 02/08/2017 05:34 AM          Objective:     Visit Vitals    /77 (BP 1 Location: Left arm, BP Patient Position: Sitting)    Pulse 63    Temp 96.9 °F (36.1 °C) (Oral)    Resp 17    Ht 5' 10\" (1.778 m)    Wt 171 lb 12.8 oz (77.9 kg)    SpO2 96%    BMI 24.65 kg/m2     The patient appears well, alert, oriented x 3, in no distress. ENT normal.  Neck supple. No adenopathy or thyromegaly. SHAW. Lungs are clear, good air entry, no wheezes, rhonchi or rales. S1 and S2 normal, no murmurs, regular rate and rhythm. Abdomen is soft without tenderness, guarding, mass or organomegaly.  exam: deferred. Extremities show no edema, normal peripheral pulses. Neurological is normal without focal findings.   Feet: toenails in both feet is yellow, thick. Jaci Vila toe is both feet. He is in the process of being seen by podiatrist.     Assessment/Plan:   healthy adult male  follow low fat diet, follow low salt diet, continue present plan, routine labs ordered, call if any problems. ICD-10-CM ICD-9-CM    1. Well adult on routine health check Z00.00 V70.0 UA/M W/RFLX CULTURE, COMP      PSA W/ REFLX FREE PSA      CBC WITH AUTOMATED DIFF      HEMOGLOBIN A1C WITH EAG      TSH AND FREE T4      METABOLIC PANEL, COMPREHENSIVE      LIPID PANEL   2. Vitamin D deficiency E55.9 268.9 VITAMIN D, 25 HYDROXY   3. Encounter for immunization Z23 V03.89 PNEUMOCOCCAL CONJ VACCINE 13 VALENT IM   4. Onychomycosis of toenail B35.1 110.1 efinaconazole (JUBLIA) phong topical solution   5. Encounter for hepatitis C virus screening test for high risk patient Z11.59 V73.89 HEPATITIS C AB    Z91.89       Maria Eugenia Jay was seen today for complete physical.    Diagnoses and all orders for this visit:    Well adult on routine health check  -     UA/M W/RFLX CULTURE, COMP  -     PSA W/ REFLX FREE PSA  -     CBC WITH AUTOMATED DIFF  -     HEMOGLOBIN A1C WITH EAG  -     TSH AND FREE T4  -     METABOLIC PANEL, COMPREHENSIVE  -     LIPID PANEL    Vitamin D deficiency  -     VITAMIN D, 25 HYDROXY    Onychomycosis of toenail  -     Start efinaconazole (JUBLIA) phong topical solution; Use daily.  -    Advised to make an appointment wit podiatrist.     Hypertension:          -  Well controlled. Continue current med. Encounter for hepatitis C virus screening test for high risk patient  -   H/o IV drug abuse in the past. Was diagnosed with hep C and treated in 2000, completely resolved. - recently he had elevated liver enzyme due to drinking alcohol but I think it is wise to repeat the test.  -    HEPATITIS C AB    Encounter for immunization  -     PNEUMOCOCCAL CONJ VACCINE 13 VALENT IM (Age 48 and over)      Follow-up Disposition:  Return in about 6 months (around 12/8/2017).   reviewed diet, exercise and weight control  cardiovascular risk and specific lipid/LDL goals reviewed  reviewed medications and side effects in detail.

## 2017-06-09 ENCOUNTER — LAB ONLY (OUTPATIENT)
Dept: INTERNAL MEDICINE CLINIC | Age: 67
End: 2017-06-09

## 2017-06-09 LAB
25(OH)D3+25(OH)D2 SERPL-MCNC: 58.1 NG/ML (ref 30–100)
ALBUMIN SERPL-MCNC: 4.7 G/DL (ref 3.6–4.8)
ALBUMIN/GLOB SERPL: 1.4 {RATIO} (ref 1.2–2.2)
ALP SERPL-CCNC: 53 IU/L (ref 39–117)
ALT SERPL-CCNC: 52 IU/L (ref 0–44)
APPEARANCE UR: CLEAR
AST SERPL-CCNC: 70 IU/L (ref 0–40)
BACTERIA #/AREA URNS HPF: NORMAL /[HPF]
BASOPHILS # BLD AUTO: 0 X10E3/UL (ref 0–0.2)
BASOPHILS NFR BLD AUTO: 1 %
BILIRUB SERPL-MCNC: 0.6 MG/DL (ref 0–1.2)
BILIRUB UR QL STRIP: NEGATIVE
BUN SERPL-MCNC: 12 MG/DL (ref 8–27)
BUN/CREAT SERPL: 14 (ref 10–24)
CALCIUM SERPL-MCNC: 9.6 MG/DL (ref 8.6–10.2)
CASTS URNS QL MICRO: NORMAL /LPF
CHLORIDE SERPL-SCNC: 98 MMOL/L (ref 96–106)
CHOLEST SERPL-MCNC: 217 MG/DL (ref 100–199)
CO2 SERPL-SCNC: 27 MMOL/L (ref 18–29)
COLOR UR: YELLOW
CREAT SERPL-MCNC: 0.83 MG/DL (ref 0.76–1.27)
EOSINOPHIL # BLD AUTO: 0.2 X10E3/UL (ref 0–0.4)
EOSINOPHIL NFR BLD AUTO: 6 %
EPI CELLS #/AREA URNS HPF: NORMAL /HPF
ERYTHROCYTE [DISTWIDTH] IN BLOOD BY AUTOMATED COUNT: 13.7 % (ref 12.3–15.4)
EST. AVERAGE GLUCOSE BLD GHB EST-MCNC: 114 MG/DL
GLOBULIN SER CALC-MCNC: 3.3 G/DL (ref 1.5–4.5)
GLUCOSE SERPL-MCNC: 81 MG/DL (ref 65–99)
GLUCOSE UR QL: NEGATIVE
HBA1C MFR BLD: 5.6 % (ref 4.8–5.6)
HCT VFR BLD AUTO: 41.9 % (ref 37.5–51)
HCV AB S/CO SERPL IA: >11 S/CO RATIO (ref 0–0.9)
HDLC SERPL-MCNC: 128 MG/DL
HGB BLD-MCNC: 14.1 G/DL (ref 12.6–17.7)
HGB UR QL STRIP: NEGATIVE
IMM GRANULOCYTES # BLD: 0 X10E3/UL (ref 0–0.1)
IMM GRANULOCYTES NFR BLD: 0 %
INTERPRETATION, 910389: NORMAL
KETONES UR QL STRIP: NEGATIVE
LDLC SERPL CALC-MCNC: 80 MG/DL (ref 0–99)
LEUKOCYTE ESTERASE UR QL STRIP: NEGATIVE
LYMPHOCYTES # BLD AUTO: 1.6 X10E3/UL (ref 0.7–3.1)
LYMPHOCYTES NFR BLD AUTO: 42 %
MCH RBC QN AUTO: 32.2 PG (ref 26.6–33)
MCHC RBC AUTO-ENTMCNC: 33.7 G/DL (ref 31.5–35.7)
MCV RBC AUTO: 96 FL (ref 79–97)
MICRO URNS: NORMAL
MICRO URNS: NORMAL
MONOCYTES # BLD AUTO: 0.5 X10E3/UL (ref 0.1–0.9)
MONOCYTES NFR BLD AUTO: 13 %
MUCOUS THREADS URNS QL MICRO: PRESENT
NEUTROPHILS # BLD AUTO: 1.4 X10E3/UL (ref 1.4–7)
NEUTROPHILS NFR BLD AUTO: 38 %
NITRITE UR QL STRIP: NEGATIVE
PH UR STRIP: 6.5 [PH] (ref 5–7.5)
PLATELET # BLD AUTO: 205 X10E3/UL (ref 150–379)
POTASSIUM SERPL-SCNC: 4.1 MMOL/L (ref 3.5–5.2)
PROT SERPL-MCNC: 8 G/DL (ref 6–8.5)
PROT UR QL STRIP: NEGATIVE
PSA SERPL-MCNC: 0.2 NG/ML (ref 0–4)
RBC # BLD AUTO: 4.38 X10E6/UL (ref 4.14–5.8)
RBC #/AREA URNS HPF: NORMAL /HPF
REFLEX CRITERIA: NORMAL
SODIUM SERPL-SCNC: 142 MMOL/L (ref 134–144)
SP GR UR: 1.03 (ref 1–1.03)
T4 FREE SERPL-MCNC: 1.16 NG/DL (ref 0.82–1.77)
TRIGL SERPL-MCNC: 45 MG/DL (ref 0–149)
TSH SERPL DL<=0.005 MIU/L-ACNC: 2.24 UIU/ML (ref 0.45–4.5)
URINALYSIS REFLEX , 377201: NORMAL
UROBILINOGEN UR STRIP-MCNC: 1 MG/DL (ref 0.2–1)
VLDLC SERPL CALC-MCNC: 9 MG/DL (ref 5–40)
WBC # BLD AUTO: 3.8 X10E3/UL (ref 3.4–10.8)
WBC #/AREA URNS HPF: NORMAL /HPF

## 2017-06-12 NOTE — PROGRESS NOTES
Please call patient :    I have reviewed all the lab results. There are some abnormalities that are not critical to the patient's health, but I would like to discuss these in person at an office appointment. Please ask him to schedule a follow up visit with me at his convenience. Vit D, PSA, thyroid, kidney function is normal.   Liver enzymes are slightly elevated.

## 2017-06-13 ENCOUNTER — TELEPHONE (OUTPATIENT)
Dept: INTERNAL MEDICINE CLINIC | Age: 67
End: 2017-06-13

## 2017-06-13 NOTE — TELEPHONE ENCOUNTER
----- Message from Taylor Dong MD sent at 6/12/2017  4:50 PM EDT -----  Please call patient :    I have reviewed all the lab results. There are some abnormalities that are not critical to the patient's health, but I would like to discuss these in person at an office appointment. Please ask him to schedule a follow up visit with me at his convenience. Vit D, PSA, thyroid, kidney function is normal.   Liver enzymes are slightly elevated.

## 2017-06-15 ENCOUNTER — OFFICE VISIT (OUTPATIENT)
Dept: INTERNAL MEDICINE CLINIC | Age: 67
End: 2017-06-15

## 2017-06-15 VITALS
HEART RATE: 89 BPM | BODY MASS INDEX: 24.48 KG/M2 | RESPIRATION RATE: 18 BRPM | OXYGEN SATURATION: 97 % | TEMPERATURE: 96.6 F | DIASTOLIC BLOOD PRESSURE: 77 MMHG | HEIGHT: 70 IN | WEIGHT: 171 LBS | SYSTOLIC BLOOD PRESSURE: 120 MMHG

## 2017-06-15 DIAGNOSIS — I10 ESSENTIAL HYPERTENSION: ICD-10-CM

## 2017-06-15 DIAGNOSIS — E55.9 VITAMIN D DEFICIENCY: ICD-10-CM

## 2017-06-15 DIAGNOSIS — B19.20 HEPATITIS C VIRUS INFECTION WITHOUT HEPATIC COMA, UNSPECIFIED CHRONICITY: Primary | ICD-10-CM

## 2017-06-15 NOTE — PROGRESS NOTES
Subjective:     Chief Complaint   Patient presents with    Results     pt here to discuss        He  is a 74y.o. year old male with h/o HTN, h/o Hep c who presents today for follow up on his lab results. Patient has a h/o IV drug abuse. He was diagnosed with Hep C in 2000, treated after that. He has been sober since then. His recent lab is positive for Hep C. Elevated liver enzymes but otherwise labs are normal.   He denies any chest pain, soa, abdominal pain. He states that he has stopped drinking alcohol since his recent hospital admission in 2/2017 for upper GI bleeding. Lab Results   Component Value Date/Time    Hep C Virus Ab >11.0 06/08/2017 09:25 AM       Lab Results  Component Value Date/Time   WBC 3.8 06/08/2017 09:25 AM   HGB 14.1 06/08/2017 09:25 AM   HCT 41.9 06/08/2017 09:25 AM   PLATELET 265 29/84/6782 09:25 AM   MCV 96 06/08/2017 09:25 AM     Lab Results  Component Value Date/Time   Cholesterol, total 217 06/08/2017 09:25 AM   HDL Cholesterol 128 06/08/2017 09:25 AM   LDL, calculated 80 06/08/2017 09:25 AM   Triglyceride 45 06/08/2017 09:25 AM     Lab Results  Component Value Date/Time   ALT (SGPT) 52 06/08/2017 09:25 AM   AST (SGOT) 70 06/08/2017 09:25 AM   Alk.  phosphatase 53 06/08/2017 09:25 AM   Bilirubin, total 0.6 06/08/2017 09:25 AM   Albumin 4.7 06/08/2017 09:25 AM   Protein, total 8.0 06/08/2017 09:25 AM   INR 1.1 02/08/2017 05:34 AM   Prothrombin time 11.0 02/08/2017 05:34 AM   PLATELET 432 91/66/2216 09:25 AM       Lab Results  Component Value Date/Time   GFR est non-AA 92 06/08/2017 09:25 AM   GFR, non-AA (POC) >60 07/08/2010 06:57 PM   GFR est  06/08/2017 09:25 AM   GFR-AA (POC) >60 07/08/2010 06:57 PM   Creatinine 0.83 06/08/2017 09:25 AM   Creatinine (POC) 0.9 07/08/2010 06:57 PM   BUN 12 06/08/2017 09:25 AM   Sodium 142 06/08/2017 09:25 AM   Potassium 4.1 06/08/2017 09:25 AM   Chloride 98 06/08/2017 09:25 AM   CO2 27 06/08/2017 09:25 AM     Lab Results  Component Value Date/Time   Prostate Specific Ag 0.2 06/08/2017 09:25 AM   Prostate Specific Ag 0.2 06/08/2016 07:47 AM   Prostate Specific Ag 0.2 05/18/2015 09:20 AM     Lab Results  Component Value Date/Time   TSH 2.240 06/08/2017 09:25 AM   T4, Free 1.16 06/08/2017 09:25 AM      Lab Results   Component Value Date/Time    Hemoglobin A1c 5.6 06/08/2017 09:25 AM          Pertinent items are noted in HPI. Objective:     Vitals:    06/15/17 0813   BP: 120/77   Pulse: 89   Resp: 18   Temp: 96.6 °F (35.9 °C)   TempSrc: Oral   SpO2: 97%   Weight: 171 lb (77.6 kg)   Height: 5' 10\" (1.778 m)       Physical Examination: General appearance - alert, well appearing, and in no distress, oriented to person, place, and time and normal appearing weight  Mental status - alert, oriented to person, place, and time, normal mood, behavior, speech, dress, motor activity, and thought processes  Chest - clear to auscultation, no wheezes, rales or rhonchi, symmetric air entry  Heart - normal rate, regular rhythm, normal S1, S2, no murmurs, rubs, clicks or gallops    No Known Allergies   Social History     Social History    Marital status: SINGLE     Spouse name: N/A    Number of children: N/A    Years of education: N/A     Social History Main Topics    Smoking status: Former Smoker    Smokeless tobacco: Never Used      Comment: quit May 2010    Alcohol use 0.5 oz/week     1 Cans of beer per week      Comment: occasional beer.      Drug use: No      Comment: quit 2007    Sexual activity: Not Currently     Other Topics Concern    None     Social History Narrative      Family History   Problem Relation Age of Onset    Heart Disease Mother     Diabetes Father     Diabetes Sister     Diabetes Paternal Grandmother       Past Surgical History:   Procedure Laterality Date    HX APPENDECTOMY      HX GI      surgical repair of bleeding ulcer    HX GI      colon resection    HX HEENT      T&A    HX KNEE ARTHROSCOPY  1996    right knee    HX ORTHOPAEDIC  2003 & 2005    left knee      Past Medical History:   Diagnosis Date    Hepatitis C     Hepatitis C 8/27/2010    Hypertension     PUD (peptic ulcer disease)     Seizures ([address]     as a child-none since ~1976      Current Outpatient Prescriptions   Medication Sig Dispense Refill    efinaconazole (JUBLIA) phong topical solution Use daily. 8 mL 11    ergocalciferol (ERGOCALCIFEROL) 50,000 unit capsule TAKE 1 CAPSULE BY MOUTH ONCE A WEEK  5    pantoprazole (PROTONIX) 40 mg tablet Take 1 Tab by mouth Before breakfast and dinner. Script on chart from GI  40 mg twice daily for a total of 10 days then decrease to 40 mg daily (Patient taking differently: Take 40 mg by mouth daily. Script on chart from GI  40 mg twice daily for a total of 10 days then decrease to 40 mg daily) 60 Tab 0    fluticasone (FLONASE) 50 mcg/actuation nasal spray 2 Sprays by Both Nostrils route daily as needed for Rhinitis or Allergies. Indications: ALLERGIC RHINITIS      dilTIAZem CD (CARDIZEM CD) 180 mg ER capsule Take 1 Cap by mouth daily. 90 Cap 1    buprenorphine-naloxone (SUBOXONE) 8-2 mg Subl 2 Tabs by SubLINGual route daily. (Patient taking differently: 1.5 Tabs by SubLINGual route daily.) 60 Tab 2    multivitamin (ONE A DAY) tablet Take 1 Tab by mouth daily. Assessment/ Plan:   Nakul Hurtado was seen today for results. Diagnoses and all orders for this visit:    Hepatitis C virus infection without hepatic coma, unspecified chronicity  -     HEPATITIS C QT BY PCR WITH REFLEX GENOTYPE  -     He will contact with his Gastroenterologist Dr. Francesca Harris to find out if he treats Hep C or not. If not patient will contact me so that I can give him a referral for hepatologist.     Vitamin D deficiency        - well controlled. Continue weekly vit D. Essential hypertension         -  Well controlled. Continue current med. Medication risks/benefits/costs/interactions/alternatives discussed with patient.   Advised patient to call back or return to office if symptoms worsen/change/persist. If patient cannot reach us or should anything more severe/urgent arise he/she should proceed directly to the nearest emergency department. Discussed expected course/resolution/complications of diagnosis in detail with patient. Patient given a written after visit summary which includes her diagnoses, current medications and vitals. Patient expressed understanding with the diagnosis and plan. Follow-up Disposition:  Return if symptoms worsen or fail to improve.

## 2017-06-15 NOTE — MR AVS SNAPSHOT
Visit Information Date & Time Provider Department Dept. Phone Encounter #  
 6/15/2017  8:00 AM Ruben Rao MD [facility] Internal Medicine [phone] [id number] Follow-up Instructions Return if symptoms worsen or fail to improve. Your Appointments 12/8/2017  8:30 AM  
ROUTINE CARE with Manuelito Dubon MD  
[facility] Internal Medicine [facility]) Appt Note: 6 month follow up  
 [address] [address]  
[phone]  
  
   
 [address] [address] Upcoming Health Maintenance Date Due  
 GLAUCOMA SCREENING Q2Y 1/31/2017 MEDICARE YEARLY EXAM 1/31/2017 COLONOSCOPY 7/19/2017* INFLUENZA AGE 9 TO ADULT 8/1/2017 Pneumococcal 65+ Low/Medium Risk (2 of 2 - PPSV23) 6/9/2018 DTaP/Tdap/Td series (2 - Td) 5/18/2025 *Topic was postponed. The date shown is not the original due date. Allergies as of 6/15/2017  Review Complete On: 6/15/2017 By: She Rubio LPN No Known Allergies Current Immunizations  Reviewed on 6/8/2017 Name Date Influenza High Dose Vaccine PF 12/13/2016 Influenza Vaccine 11/20/2015, 11/14/2014, 1/11/2013 Influenza Vaccine Split 10/28/2011 Pneumococcal Conjugate (PCV-13) 6/9/2017 Tdap 5/18/2015 Not reviewed this visit You Were Diagnosed With   
  
 Codes Comments Hepatitis C virus infection without hepatic coma, unspecified chronicity    -  Primary ICD-10-CM: B19.20 ICD-9-CM: 070.70 Vitals BP Pulse Temp Resp Height(growth percentile) Weight(growth percentile) 120/77 (BP 1 Location: Left arm, BP Patient Position: Sitting) 89 96.6 °F (35.9 °C) (Oral) 18 5' 10\" (1.778 m) 171 lb (77.6 kg) SpO2 BMI Smoking Status 97% 24.54 kg/m2 Former Smoker BMI and BSA Data Body Mass Index Body Surface Area 24.54 kg/m 2 1.96 m 2 Preferred Pharmacy Pharmacy Name Phone [id number] [id number] [facility] AT [facility] [phone] Your Updated Medication List  
  
   
This list is accurate as of: 6/15/17  8:25 AM.  Always use your most recent med list.  
  
  
  
  
 buprenorphine-naloxone 8-2 mg Subl Commonly known as:  SUBOXONE  
2 Tabs by SubLINGual route daily. dilTIAZem  mg ER capsule Commonly known as:  CARDIZEM CD Take 1 Cap by mouth daily.  
  
 efinaconazole Yareli topical solution Commonly known as:  Angel Gens Use daily. ergocalciferol 50,000 unit capsule Commonly known as:  ERGOCALCIFEROL  
TAKE 1 CAPSULE BY MOUTH ONCE A WEEK FLONASE 50 mcg/actuation nasal spray Generic drug:  fluticasone 2 Sprays by Both Nostrils route daily as needed for Rhinitis or Allergies. Indications: ALLERGIC RHINITIS  
  
 multivitamin tablet Commonly known as:  ONE A DAY Take 1 Tab by mouth daily. pantoprazole 40 mg tablet Commonly known as:  PROTONIX Take 1 Tab by mouth Before breakfast and dinner. Script on chart from GI 40 mg twice daily for a total of 10 days then decrease to 40 mg daily We Performed the Following HEPATITIS C QT BY PCR WITH REFLEX GENOTYPE [[id number] Custom] Follow-up Instructions Return if symptoms worsen or fail to improve. Introducing [facility] [place]! Toni Manning introduces [employer] patient portal. Now you can access parts of your medical record, email your doctor's office, and request medication refills online. 1. In your internet browser, go to [email or web address] 2. Click on the First Time User? Click Here link in the Sign In box. You will see the New Member Sign Up page. 3. Enter your [employer] [facility] Access Code exactly as it appears below. You will not need to use this code after youve completed the sign-up process. If you do not sign up before the expiration date, you must request a new code. · [employer] Access Code: [email or web address] Expires: 6/24/2017  4:29 PM 
 
4. Enter the last four digits of your Social Security Number (xxxx) and Date of Birth (mm/dd/yyyy) as indicated and click Submit. You will be taken to the next sign-up page. 5. Create a [employer] ID. This will be your [employer] login ID and cannot be changed, so think of one that is secure and easy to remember. 6. Create a [employer] password. You can change your password at any time. 7. Enter your Password Reset Question and Answer. This can be used at a later time if you forget your password. 8. Enter your e-mail address. You will receive e-mail notification when new information is available in [address] 9. Click Sign Up. You can now view and download portions of your medical record. 10. Click the Download Summary menu link to download a portable copy of your medical information. If you have questions, please visit the Frequently Asked Questions section of the [employer] website. Remember, [employer] is NOT to be used for urgent needs. For medical emergencies, dial 911. Now available from your iPhone and Android! Please provide this summary of care documentation to your next provider. Your primary care clinician is listed as Ruben Johnson. If you have any questions after today's visit, please call [phone].

## 2017-06-18 LAB
HCV GENOTYPE: NORMAL
HCV GENTYP SERPL NAA+PROBE: NORMAL
HCV RNA SERPL NAA+PROBE-ACNC: NORMAL IU/ML
HCV RNA SERPL NAA+PROBE-LOG IU: 5.06 LOG10 IU/ML
PLEASE NOTE, 550474: NORMAL
TEST INFORMATION: NORMAL

## 2017-06-19 ENCOUNTER — TELEPHONE (OUTPATIENT)
Dept: INTERNAL MEDICINE CLINIC | Age: 67
End: 2017-06-19

## 2017-06-19 NOTE — TELEPHONE ENCOUNTER
Pt states that he has not had a chance to do it yet. Will do it today. Advised him to let us know if he needs help. Pt verbalized his understanding.

## 2017-06-19 NOTE — TELEPHONE ENCOUNTER
----- Message from Miguel Beaulieu MD sent at 6/19/2017  8:16 AM EDT -----  Please call him to see whether he inquired about the Hep C treatment with his current gastroenterologist.   If he needs any asistence or a referral let me know.

## 2017-06-19 NOTE — PROGRESS NOTES
Please call him to see whether he inquired about the Hep C treatment with his current gastroenterologist.   If he needs any asistence or a referral let me know.

## 2017-08-12 RX ORDER — DILTIAZEM HYDROCHLORIDE 180 MG/1
CAPSULE, COATED, EXTENDED RELEASE ORAL
Qty: 90 CAP | Refills: 1 | Status: SHIPPED | OUTPATIENT
Start: 2017-08-12 | End: 2017-12-08 | Stop reason: SDUPTHER

## 2017-08-31 DIAGNOSIS — E55.9 VITAMIN D DEFICIENCY: ICD-10-CM

## 2017-09-06 RX ORDER — ERGOCALCIFEROL 1.25 MG/1
CAPSULE ORAL
Qty: 4 CAP | Refills: 5 | Status: SHIPPED | OUTPATIENT
Start: 2017-09-06 | End: 2017-12-13 | Stop reason: SDUPTHER

## 2017-10-16 ENCOUNTER — OFFICE VISIT (OUTPATIENT)
Dept: INTERNAL MEDICINE CLINIC | Age: 67
End: 2017-10-16

## 2017-10-16 VITALS
TEMPERATURE: 97.3 F | HEART RATE: 64 BPM | RESPIRATION RATE: 18 BRPM | SYSTOLIC BLOOD PRESSURE: 118 MMHG | OXYGEN SATURATION: 96 % | HEIGHT: 70 IN | WEIGHT: 173 LBS | DIASTOLIC BLOOD PRESSURE: 80 MMHG | BODY MASS INDEX: 24.77 KG/M2

## 2017-10-16 DIAGNOSIS — Z23 ENCOUNTER FOR IMMUNIZATION: ICD-10-CM

## 2017-10-16 DIAGNOSIS — B19.20 HEPATITIS C VIRUS INFECTION WITHOUT HEPATIC COMA, UNSPECIFIED CHRONICITY: ICD-10-CM

## 2017-10-16 DIAGNOSIS — R06.83 SNORES: Primary | ICD-10-CM

## 2017-10-16 NOTE — MR AVS SNAPSHOT
Visit Information Date & Time Provider Department Dept. Phone Encounter #  
 10/16/2017  3:15 PM Ruben Vik Heath MD [facility] Internal Medicine [phone] [id number] Follow-up Instructions Return if symptoms worsen or fail to improve. Your Appointments 12/8/2017  8:30 AM  
ROUTINE CARE with Osiel Ayoub MD  
[facility] Internal Medicine [address]) Appt Note: 6 month follow up  
 Ines Milton [address] [address]  
[phone]  
  
   
 [place] [address] Upcoming Health Maintenance Date Due COLONOSCOPY 8/18/2015 GLAUCOMA SCREENING Q2Y 1/31/2017 MEDICARE YEARLY EXAM 1/31/2017 INFLUENZA AGE 9 TO ADULT 8/1/2017 Pneumococcal 65+ Low/Medium Risk (2 of 2 - PPSV23) 6/9/2018 DTaP/Tdap/Td series (2 - Td) 5/18/2025 Allergies as of 10/16/2017  Review Complete On: 10/16/2017 By: Stephanie Bland LPN No Known Allergies Current Immunizations  Reviewed on 6/8/2017 Name Date Influenza High Dose Vaccine PF  Incomplete, 12/13/2016 Influenza Vaccine 11/20/2015, 11/14/2014, 1/11/2013 Influenza Vaccine Split 10/28/2011 Pneumococcal Conjugate (PCV-13) 6/9/2017 Tdap 5/18/2015 Not reviewed this visit You Were Diagnosed With   
  
 Codes Comments Encounter for immunization    -  Primary ICD-10-CM: C06 ICD-9-CM: V03.89 Snores     ICD-10-CM: R06.83 
ICD-9-CM: 786.09 Vitals BP Pulse Temp Resp Height(growth percentile) Weight(growth percentile) 118/80 (BP 1 Location: Left arm, BP Patient Position: Sitting) 64 97.3 °F (36.3 °C) (Oral) 18 5' 10\" (1.778 m) 173 lb (78.5 kg) SpO2 BMI Smoking Status 96% 24.82 kg/m2 Former Smoker Vitals History BMI and BSA Data Body Mass Index Body Surface Area  
 24.82 kg/m 2 1.97 m 2 Preferred Pharmacy Pharmacy Name Phone [id number], [place] - [address] [facility] AT [address] [place] [address] Juanwejuancarlos [phone] Your Updated Medication List  
  
   
This list is accurate as of: 10/16/17  3:28 PM.  Always use your most recent med list.  
  
  
  
  
 buprenorphine-naloxone 8-2 mg Subl Commonly known as:  SUBOXONE  
2 Tabs by SubLINGual route daily. * dilTIAZem  mg ER capsule Commonly known as:  CARDIZEM CD Take 1 Cap by mouth daily. * dilTIAZem  mg ER capsule Commonly known as:  CARDIZEM CD  
TAKE ONE CAPSULE BY MOUTH EVERY DAY  
  
 ergocalciferol 50,000 unit capsule Commonly known as:  ERGOCALCIFEROL  
TAKE 1 CAPSULE BY MOUTH ONCE A WEEK FLONASE 50 mcg/actuation nasal spray Generic drug:  fluticasone 2 Sprays by Both Nostrils route daily as needed for Rhinitis or Allergies. Indications: ALLERGIC RHINITIS  
  
 multivitamin tablet Commonly known as:  ONE A DAY Take 1 Tab by mouth daily. pantoprazole 40 mg tablet Commonly known as:  PROTONIX Take 1 Tab by mouth Before breakfast and dinner. Script on chart from GI 40 mg twice daily for a total of 10 days then decrease to 40 mg daily * Notice: This list has 2 medication(s) that are the same as other medications prescribed for you. Read the directions carefully, and ask your doctor or other care provider to review them with you. We Performed the Following INFLUENZA VIRUS VACCINE, HIGH DOSE SEASONAL, PRESERVATIVE FREE [46094 CPT(R)] SLEEP MEDICINE REFERRAL [[id number] Custom] Comments:  
 Orders: 
Sleep Medicine Consult - Schedule patient for a sleep specialist consult. If appropriate, schedule patient for sleep study(s). Initiate treatment if needed. Forward correspondance to my office. Follow-up Instructions Return if symptoms worsen or fail to improve. Referral Information Referral ID Referred By Referred To  
  
 [id number] SCHUYLER ALTAMIRANO MD   
   [address] [place] [address] Phone: [phone] Fax: [phone] Visits Status Start Date End Date 1 New Request 10/16/17 10/16/18 If your referral has a status of pending review or denied, additional information will be sent to support the outcome of this decision. Introducing [facility] & HEALTH SERVICES! [facility] introduces [employer] patient portal. Now you can access parts of your medical record, email your doctor's office, and request medication refills online. 1. In your internet browser, go to [email or web address] 2. Click on the First Time User? Click Here link in the Sign In box. You will see the New Member Sign Up page. 3. Enter your Pet Ready Access Code exactly as it appears below. You will not need to use this code after youve completed the sign-up process. If you do not sign up before the expiration date, you must request a new code. · Pet Ready Access Code: [email or web address] Expires: 1/14/2018  3:28 PM 
 
4. Enter the last four digits of your Social Security Number (xxxx) and Date of Birth (mm/dd/yyyy) as indicated and click Submit. You will be taken to the next sign-up page. 5. Create a Pet Ready ID. This will be your Pet Ready login ID and cannot be changed, so think of one that is secure and easy to remember. 6. Create a Pet Ready password. You can change your password at any time. 7. Enter your Password Reset Question and Answer. This can be used at a later time if you forget your password. 8. Enter your e-mail address. You will receive e-mail notification when new information is available in [address] 9. Click Sign Up. You can now view and download portions of your medical record. 10. Click the Download Summary menu link to download a portable copy of your medical information. If you have questions, please visit the Frequently Asked Questions section of the Pet Ready website. Remember, Pet Ready is NOT to be used for urgent needs. For medical emergencies, dial 911. Now available from your iPhone and Android! Please provide this summary of care documentation to your next provider. Your primary care clinician is listed as Ruben DYE Alex. If you have any questions after today's visit, please call [phone].

## 2017-10-16 NOTE — PROGRESS NOTES
Subjective:     Chief Complaint   Patient presents with    Sleep Apnea        He  is a 74y.o. year old male with h/o HTN, Hep C who presents today with a complain of snoring. Reports that his girlfriend witnessed him snoring  and gasping with air. He never wake up at night with difficulty breathing. Feels tired during the day time but does not feel lack of sleep. Pertinent items are noted in HPI. Objective:     Vitals:    10/16/17 1512   BP: 118/80   Pulse: 64   Resp: 18   Temp: 97.3 °F (36.3 °C)   TempSrc: Oral   SpO2: 96%   Weight: 173 lb (78.5 kg)   Height: 5' 10\" (1.778 m)       Physical Examination: General appearance - alert, well appearing, and in no distress, oriented to person, place, and time and normal appearing weight  Mental status - alert, oriented to person, place, and time, normal mood, behavior, speech, dress, motor activity, and thought processes  Chest - clear to auscultation, no wheezes, rales or rhonchi, symmetric air entry  Heart - normal rate, regular rhythm, normal S1, S2, no murmurs, rubs, clicks or gallops  Abdomen - soft, nontender, nondistended, no masses or organomegaly    No Known Allergies   Social History     Social History    Marital status: SINGLE     Spouse name: N/A    Number of children: N/A    Years of education: N/A     Social History Main Topics    Smoking status: Former Smoker    Smokeless tobacco: Never Used      Comment: quit May 2010    Alcohol use 0.5 oz/week     1 Cans of beer per week      Comment: occasional beer.      Drug use: No      Comment: quit 2007    Sexual activity: Not Currently     Other Topics Concern    None     Social History Narrative      Family History   Problem Relation Age of Onset    Heart Disease Mother     Diabetes Father     Diabetes Sister     Diabetes Paternal Grandmother       Past Surgical History:   Procedure Laterality Date    HX APPENDECTOMY      HX GI      surgical repair of bleeding ulcer    HX GI      colon resection    HX HEENT      T&A    HX KNEE ARTHROSCOPY  1996    right knee    HX ORTHOPAEDIC  2003 & 2005    left knee      Past Medical History:   Diagnosis Date    Hepatitis C     Hepatitis C 8/27/2010    Hypertension     PUD (peptic ulcer disease)     Seizures ([facility] [address]     as a child-none since ~1976      Current Outpatient Prescriptions   Medication Sig Dispense Refill    ergocalciferol (ERGOCALCIFEROL) 50,000 unit capsule TAKE 1 CAPSULE BY MOUTH ONCE A WEEK 4 Cap 5    dilTIAZem CD (CARDIZEM CD) 180 mg ER capsule TAKE ONE CAPSULE BY MOUTH EVERY DAY 90 Cap 1    pantoprazole (PROTONIX) 40 mg tablet Take 1 Tab by mouth Before breakfast and dinner. Script on chart from GI  40 mg twice daily for a total of 10 days then decrease to 40 mg daily (Patient taking differently: Take 40 mg by mouth daily. Script on chart from GI  40 mg twice daily for a total of 10 days then decrease to 40 mg daily) 60 Tab 0    fluticasone (FLONASE) 50 mcg/actuation nasal spray 2 Sprays by Both Nostrils route daily as needed for Rhinitis or Allergies. Indications: ALLERGIC RHINITIS      dilTIAZem CD (CARDIZEM CD) 180 mg ER capsule Take 1 Cap by mouth daily. 90 Cap 1    buprenorphine-naloxone (SUBOXONE) 8-2 mg Subl 2 Tabs by SubLINGual route daily. (Patient taking differently: 1.5 Tabs by SubLINGual route daily.) 60 Tab 2    multivitamin (ONE A DAY) tablet Take 1 Tab by mouth daily. Assessment/ Plan:   Diagnoses and all orders for this visit:    1. Snores  -     SLEEP MEDICINE REFERRAL    2. Hepatitis C virus infection without hepatic coma, unspecified chronicity         -   Patient did not see his GI for the hep C Rx yet. Strongly advised to do so as soon as possible. 3. Encounter for immunization  -     INFLUENZA VIRUS VACCINE, HIGH DOSE SEASONAL, PRESERVATIVE FREE           Medication risks/benefits/costs/interactions/alternatives discussed with patient.   Advised patient to call back or return to office if symptoms worsen/change/persist. If patient cannot reach us or should anything more severe/urgent arise he/she should proceed directly to the nearest emergency department. Discussed expected course/resolution/complications of diagnosis in detail with patient. Patient given a written after visit summary which includes her diagnoses, current medications and vitals. Patient expressed understanding with the diagnosis and plan. Follow-up Disposition:  Return if symptoms worsen or fail to improve.

## 2017-12-08 ENCOUNTER — OFFICE VISIT (OUTPATIENT)
Dept: INTERNAL MEDICINE CLINIC | Age: 67
End: 2017-12-08

## 2017-12-08 VITALS
WEIGHT: 172.8 LBS | RESPIRATION RATE: 18 BRPM | SYSTOLIC BLOOD PRESSURE: 127 MMHG | HEIGHT: 70 IN | TEMPERATURE: 97.9 F | BODY MASS INDEX: 24.74 KG/M2 | DIASTOLIC BLOOD PRESSURE: 88 MMHG | OXYGEN SATURATION: 99 % | HEART RATE: 67 BPM

## 2017-12-08 DIAGNOSIS — I10 ESSENTIAL HYPERTENSION: Primary | ICD-10-CM

## 2017-12-08 DIAGNOSIS — B19.20 HEPATITIS C VIRUS INFECTION WITHOUT HEPATIC COMA, UNSPECIFIED CHRONICITY: ICD-10-CM

## 2017-12-08 RX ORDER — DILTIAZEM HYDROCHLORIDE 180 MG/1
180 CAPSULE, COATED, EXTENDED RELEASE ORAL DAILY
Qty: 90 CAP | Refills: 1 | Status: SHIPPED | OUTPATIENT
Start: 2017-12-08 | End: 2018-06-19 | Stop reason: SDUPTHER

## 2017-12-08 NOTE — MR AVS SNAPSHOT
Visit Information Date & Time Provider Department Dept. Phone Encounter #  
 12/8/2017  8:30 AM Ruben Herrmann MD [facility] Internal Medicine [phone] [id number] Follow-up Instructions Return in about 6 months (around 6/8/2018) for complete physical  and fasting blood work. Katherene Means Upcoming Health Maintenance Date Due COLONOSCOPY 8/18/2015 GLAUCOMA SCREENING Q2Y 1/31/2017 MEDICARE YEARLY EXAM 1/31/2017 Pneumococcal 65+ Low/Medium Risk (2 of 2 - PPSV23) 6/9/2018 DTaP/Tdap/Td series (2 - Td) 5/18/2025 Allergies as of 12/8/2017  Review Complete On: 12/8/2017 By: Muriel Bhat MD  
 No Known Allergies Current Immunizations  Reviewed on 6/8/2017 Name Date Influenza High Dose Vaccine PF 10/16/2017, 12/13/2016 Influenza Vaccine 11/20/2015, 11/14/2014, 1/11/2013 Influenza Vaccine Split 10/28/2011 Pneumococcal Conjugate (PCV-13) 6/9/2017 Tdap 5/18/2015 Not reviewed this visit You Were Diagnosed With   
  
 Codes Comments Hepatitis C virus infection without hepatic coma, unspecified chronicity    -  Primary ICD-10-CM: B19.20 ICD-9-CM: 070.70 Essential hypertension     ICD-10-CM: I10 
ICD-9-CM: 401.9 Vitals BP Pulse Temp Resp Height(growth percentile) Weight(growth percentile) 127/88 (BP 1 Location: Left arm, BP Patient Position: Sitting) 67 97.9 °F (36.6 °C) (Oral) 18 5' 10\" (1.778 m) 172 lb 12.8 oz (78.4 kg) SpO2 BMI Smoking Status 99% 24.79 kg/m2 Former Smoker Vitals History BMI and BSA Data Body Mass Index Body Surface Area 24.79 kg/m 2 1.97 m 2 Preferred Pharmacy Pharmacy Name Phone [id number], [place] - [address] [place] [address] AT [address] [place] [address] [phone] Your Updated Medication List  
  
   
This list is accurate as of: 12/8/17  8:38 AM.  Always use your most recent med list.  
  
  
  
  
 buprenorphine-naloxone 8-2 mg Subl Commonly known as:  SUBOXONE  
2 Tabs by SubLINGual route daily. dilTIAZem  mg ER capsule Commonly known as:  CARDIZEM CD Take 1 Cap by mouth daily. ergocalciferol 50,000 unit capsule Commonly known as:  ERGOCALCIFEROL  
TAKE 1 CAPSULE BY MOUTH ONCE A WEEK FLONASE 50 mcg/actuation nasal spray Generic drug:  fluticasone 2 Sprays by Both Nostrils route daily as needed for Rhinitis or Allergies. Indications: ALLERGIC RHINITIS  
  
 multivitamin tablet Commonly known as:  ONE A DAY Take 1 Tab by mouth daily. Prescriptions Sent to Pharmacy Refills  
 dilTIAZem CD (CARDIZEM CD) 180 mg ER capsule 1 Sig: Take 1 Cap by mouth daily. Class: Normal  
 Pharmacy: [id number] [address] [place] [address] AT [address] [place] [address] #: [phone] Route: Oral  
  
Follow-up Instructions Return in about 6 months (around 6/8/2018) for complete physical  and fasting blood work. Brynn Brock Introducing hospitals & HEALTH SERVICES! [facility] introduces [employer] patient portal. Now you can access parts of your medical record, email your doctor's office, and request medication refills online. 1. In your internet browser, go to [email or web address] 2. Click on the First Time User? Click Here link in the Sign In box. You will see the New Member Sign Up page. 3. Enter your [employer] Access Code exactly as it appears below. You will not need to use this code after youve completed the sign-up process. If you do not sign up before the expiration date, you must request a new code. · [employer] Access Code: [email or web address] Expires: 1/14/2018  2:28 PM 
 
4. Enter the last four digits of your Social Security Number (xxxx) and Date of Birth (mm/dd/yyyy) as indicated and click Submit. You will be taken to the next sign-up page. 5. Create a [employer] ID.  This will be your [employer] login ID and cannot be changed, so think of one that is secure and easy to remember. 6. Create a ZingCheckout password. You can change your password at any time. 7. Enter your Password Reset Question and Answer. This can be used at a later time if you forget your password. 8. Enter your e-mail address. You will receive e-mail notification when new information is available in [address] 9. Click Sign Up. You can now view and download portions of your medical record. 10. Click the Download Summary menu link to download a portable copy of your medical information. If you have questions, please visit the Frequently Asked Questions section of the ZingCheck[employer] website. Remember, ZingCheckout is NOT to be used for urgent needs. For medical emergencies, dial 911. Now available from your iPhone and Android! Please provide this summary of care documentation to your next provider. Your primary care clinician is listed as Ruben Johnson. If you have any questions after today's visit, please call [phone].

## 2017-12-08 NOTE — PROGRESS NOTES
Subjective:     Chief Complaint   Patient presents with    Other     6 mo f/u        He  is a 74y.o. year old male with  h/o HTN, Hep C who presents today for a 6 month follow up. BP has been well controlled with Diltiazem. Has been following up with psychiatrist for suboxone therapy. He has not seen his GI for the Hep C treatment yet. Denies any abdominal pain, N/V, bloody stool. Pertinent items are noted in HPI. Objective:     Vitals:    12/08/17 0826   BP: 127/88   Pulse: 67   Resp: 18   Temp: 97.9 °F (36.6 °C)   TempSrc: Oral   SpO2: 99%   Weight: 172 lb 12.8 oz (78.4 kg)   Height: 5' 10\" (1.778 m)       Physical Examination: General appearance - alert, well appearing, and in no distress, oriented to person, place, and time and normal appearing weight  Mental status - alert, oriented to person, place, and time, normal mood, behavior, speech, dress, motor activity, and thought processes  Chest - clear to auscultation, no wheezes, rales or rhonchi, symmetric air entry  Heart - normal rate, regular rhythm, normal S1, S2, no murmurs, rubs, clicks or gallops  Abdomen - soft, nontender, nondistended, no masses or organomegaly. Old surgical scar marks noted. No Known Allergies   Social History     Social History    Marital status: SINGLE     Spouse name: N/A    Number of children: N/A    Years of education: N/A     Social History Main Topics    Smoking status: Former Smoker    Smokeless tobacco: Never Used      Comment: quit May 2010    Alcohol use 0.5 oz/week     1 Cans of beer per week      Comment: occasional beer.      Drug use: No      Comment: quit 2007    Sexual activity: Not Currently     Other Topics Concern    None     Social History Narrative      Family History   Problem Relation Age of Onset    Heart Disease Mother     Diabetes Father     Diabetes Sister     Diabetes Paternal Grandmother       Past Surgical History:   Procedure Laterality Date    HX APPENDECTOMY      HX GI surgical repair of bleeding ulcer    HX GI      colon resection    HX HEENT      T&A    HX KNEE ARTHROSCOPY  1996    right knee    HX ORTHOPAEDIC  2003 & 2005    left knee      Past Medical History:   Diagnosis Date    Hepatitis C     Hepatitis C 8/27/2010    Hypertension     PUD (peptic ulcer disease)     Seizures ([address]     as a child-none since ~1976      Current Outpatient Prescriptions   Medication Sig Dispense Refill    dilTIAZem CD (CARDIZEM CD) 180 mg ER capsule Take 1 Cap by mouth daily. 90 Cap 1    ergocalciferol (ERGOCALCIFEROL) 50,000 unit capsule TAKE 1 CAPSULE BY MOUTH ONCE A WEEK 4 Cap 5    fluticasone (FLONASE) 50 mcg/actuation nasal spray 2 Sprays by Both Nostrils route daily as needed for Rhinitis or Allergies. Indications: ALLERGIC RHINITIS      buprenorphine-naloxone (SUBOXONE) 8-2 mg Subl 2 Tabs by SubLINGual route daily. (Patient taking differently: 1.5 Tabs by SubLINGual route daily.) 60 Tab 2    multivitamin (ONE A DAY) tablet Take 1 Tab by mouth daily. Assessment/ Plan:   Diagnoses and all orders for this visit:    1. Essential hypertension  -    BP well controlled. Continue  dilTIAZem CD (CARDIZEM CD) 180 mg ER capsule; Take 1 Cap by mouth daily. 2. Hepatitis C virus infection without hepatic coma, unspecified chronicity        - strongly encouraged patient to contact his GI for treatment. Avoid any alcohol, tylenol products. Medication risks/benefits/costs/interactions/alternatives discussed with patient. Advised patient to call back or return to office if symptoms worsen/change/persist. If patient cannot reach us or should anything more severe/urgent arise he/she should proceed directly to the nearest emergency department. Discussed expected course/resolution/complications of diagnosis in detail with patient. Patient given a written after visit summary which includes her diagnoses, current medications and vitals.   Patient expressed understanding with the diagnosis and plan. Follow-up Disposition:  Return in about 6 months (around 6/8/2018) for complete physical  and fasting blood work. .'

## 2017-12-13 DIAGNOSIS — E55.9 VITAMIN D DEFICIENCY: ICD-10-CM

## 2017-12-13 RX ORDER — ERGOCALCIFEROL 1.25 MG/1
50000 CAPSULE ORAL
Qty: 12 CAP | Refills: 3 | Status: SHIPPED | OUTPATIENT
Start: 2017-12-13 | End: 2019-03-18 | Stop reason: ALTCHOICE

## 2017-12-14 ENCOUNTER — TELEPHONE (OUTPATIENT)
Dept: INTERNAL MEDICINE CLINIC | Age: 67
End: 2017-12-14

## 2018-02-04 RX ORDER — DILTIAZEM HYDROCHLORIDE 180 MG/1
CAPSULE, COATED, EXTENDED RELEASE ORAL
Qty: 90 CAP | Refills: 1 | Status: SHIPPED | OUTPATIENT
Start: 2018-02-04 | End: 2018-06-19 | Stop reason: SDUPTHER

## 2018-06-19 ENCOUNTER — OFFICE VISIT (OUTPATIENT)
Dept: INTERNAL MEDICINE CLINIC | Age: 68
End: 2018-06-19

## 2018-06-19 VITALS
TEMPERATURE: 97.2 F | OXYGEN SATURATION: 98 % | RESPIRATION RATE: 18 BRPM | BODY MASS INDEX: 24.77 KG/M2 | HEART RATE: 64 BPM | SYSTOLIC BLOOD PRESSURE: 101 MMHG | HEIGHT: 70 IN | WEIGHT: 173 LBS | DIASTOLIC BLOOD PRESSURE: 71 MMHG

## 2018-06-19 DIAGNOSIS — Z12.11 SCREENING FOR COLORECTAL CANCER: ICD-10-CM

## 2018-06-19 DIAGNOSIS — Z00.00 WELL ADULT ON ROUTINE HEALTH CHECK: Primary | ICD-10-CM

## 2018-06-19 DIAGNOSIS — Z12.12 SCREENING FOR COLORECTAL CANCER: ICD-10-CM

## 2018-06-19 DIAGNOSIS — I10 ESSENTIAL HYPERTENSION: ICD-10-CM

## 2018-06-19 DIAGNOSIS — B19.20 HEPATITIS C VIRUS INFECTION WITHOUT HEPATIC COMA, UNSPECIFIED CHRONICITY: ICD-10-CM

## 2018-06-19 RX ORDER — DILTIAZEM HYDROCHLORIDE 120 MG/1
120 CAPSULE, COATED, EXTENDED RELEASE ORAL DAILY
Qty: 30 CAP | Refills: 3 | Status: SHIPPED | OUTPATIENT
Start: 2018-06-19 | End: 2018-11-06 | Stop reason: SDUPTHER

## 2018-06-19 NOTE — MR AVS SNAPSHOT
[address] [place] [address] Macy Milton [address] [place] [address] 
[phone] Patient: Laly Burger MRN:  [id number] Visit Information Date & Time Provider Department Dept. Phone Encounter #  
 6/19/2018  9:00 AM Winnie Whitfield MD [facility] Internal Medicine [phone] [id number] Follow-up Instructions Return in about 6 weeks (around 7/31/2018) for Bring BP log book. Danette Farooq Upcoming Health Maintenance Date Due COLONOSCOPY 8/18/2015 GLAUCOMA SCREENING Q2Y 1/31/2017 Pneumococcal 65+ Low/Medium Risk (2 of 2 - PPSV23) 6/9/2018 Influenza Age 5 to Adult 8/1/2018 DTaP/Tdap/Td series (2 - Td) 5/18/2025 Allergies as of 6/19/2018  Review Complete On: 6/19/2018 By: Winnie Whitfield MD  
 No Known Allergies Current Immunizations  Reviewed on 6/8/2017 Name Date Influenza High Dose Vaccine PF 10/16/2017, 12/13/2016 Influenza Vaccine 11/20/2015, 11/14/2014, 1/11/2013 Influenza Vaccine Split 10/28/2011 Pneumococcal Conjugate (PCV-13) 6/9/2017 Tdap 5/18/2015 Not reviewed this visit You Were Diagnosed With   
  
 Codes Comments Well adult on routine health check    -  Primary ICD-10-CM: Z00.00 ICD-9-CM: V70.0 Screening for colorectal cancer     ICD-10-CM: Z12.11, Z12.12 
ICD-9-CM: V76.51 Essential hypertension     ICD-10-CM: I10 
ICD-9-CM: 401.9 Hepatitis C virus infection without hepatic coma, unspecified chronicity     ICD-10-CM: B19.20 ICD-9-CM: 070.70 Vitals BP Pulse Temp Resp Height(growth percentile) 101/71 (BP 1 Location: Left arm, BP Patient Position: Sitting) 64 97.2 °F (36.2 °C) (Temporal) 18 5' 10\" (1.778 m) Weight(growth percentile) SpO2 BMI Smoking Status 173 lb (78.5 kg) 98% 24.82 kg/m2 Former Smoker Vitals History BMI and BSA Data Body Mass Index Body Surface Area  
 24.82 kg/m 2 1.97 m 2 Preferred Pharmacy Pharmacy Name Phone [facility]/PHARMACY #[facility] AT [address] [place] [address] [phone] Your Updated Medication List  
  
   
This list is accurate as of 6/19/18  9:36 AM.  Always use your most recent med list.  
  
  
  
  
 buprenorphine-naloxone 8-2 mg Subl Commonly known as:  SUBOXONE  
2 Tabs by SubLINGual route daily. dilTIAZem  mg ER capsule Commonly known as:  CARDIZEM CD Take 1 Cap by mouth daily. ergocalciferol 50,000 unit capsule Commonly known as:  ERGOCALCIFEROL Take 1 Cap by mouth every seven (7) days. FLONASE 50 mcg/actuation nasal spray Generic drug:  fluticasone 2 Sprays by Both Nostrils route daily as needed for Rhinitis or Allergies. Indications: ALLERGIC RHINITIS  
  
 multivitamin tablet Commonly known as:  ONE A DAY Take 1 Tab by mouth daily. Prescriptions Sent to Pharmacy Refills  
 dilTIAZem CD (CARDIZEM CD) 120 mg ER capsule 3 Sig: Take 1 Cap by mouth daily. Class: Normal  
 Pharmacy: [facility]/pharmacy #[id number] AT [address] [place] [address] Ph #: [phone] Route: Oral  
  
We Performed the Following CBC WITH AUTOMATED DIFF [58550 CPT(R)] LIPID PANEL [41751 CPT(R)] METABOLIC PANEL, COMPREHENSIVE [79225 CPT(R)] PSA W/ REFLX FREE PSA [54470 CPT(R)] REFERRAL FOR COLONOSCOPY [DLV504 Custom] REFERRAL TO LIVER HEPATOLOGY [SXP526 Custom] TSH AND FREE T4 [01249 CPT(R)] Follow-up Instructions Return in about 6 weeks (around 7/31/2018) for Bring BP log book. Osmin Ramos Referral Information Referral ID Referred By Referred To  
  
 [id number] SCHUYLER ALTAMIRANO [facility]   
   [address] [facility] [address] [place], [address] [facility] Visits Status Start Date End Date 1 New Request 6/19/18 6/19/19  If your referral has a status of pending review or denied, additional information will be sent to support the outcome of this decision. Referral ID Referred By Referred To  
 [id number] SCHUYLER ALTAMIRANO MD  
   [address] [place] [address] [address] Lauren Osman Phone: [phone] Fax: [phone] Visits Status Start Date End Date 1 New Request 6/19/18 6/19/19 If your referral has a status of pending review or denied, additional information will be sent to support the outcome of this decision. Patient Instructions Well Visit, Over 72: Care Instructions Your Care Instructions Physical exams can help you stay healthy. Your doctor has checked your overall health and may have suggested ways to take good care of yourself. He or she also may have recommended tests. At home, you can help prevent illness with healthy eating, regular exercise, and other steps. Follow-up care is a key part of your treatment and safety. Be sure to make and go to all appointments, and call your doctor if you are having problems. It's also a good idea to know your test results and keep a list of the medicines you take. How can you care for yourself at home? · Reach and stay at a healthy weight. This will lower your risk for many problems, such as obesity, diabetes, heart disease, and high blood pressure. · Get at least 30 minutes of exercise on most days of the week. Walking is a good choice. You also may want to do other activities, such as running, swimming, cycling, or playing tennis or team sports. · Do not smoke. Smoking can make health problems worse. If you need help quitting, talk to your doctor about stop-smoking programs and medicines. These can increase your chances of quitting for good. · Protect your skin from too much sun. When you're outdoors from 10 a.m. to 4 p.m., stay in the shade or cover up with clothing and a hat with a wide brim. Wear sunglasses that block UV rays.  Even when it's cloudy, put broad-spectrum sunscreen (SPF 30 or higher) on any exposed skin. · See a dentist one or two times a year for checkups and to have your teeth cleaned. · Wear a seat belt in the car. · Limit alcohol to 2 drinks a day for men and 1 drink a day for women. Too much alcohol can cause health problems. Follow your doctor's advice about when to have certain tests. These tests can spot problems early. For men and women · Cholesterol. Your doctor will tell you how often to have this done based on your overall health and other things that can increase your risk for heart attack and stroke. · Blood pressure. Have your blood pressure checked during a routine doctor visit. Your doctor will tell you how often to check your blood pressure based on your age, your blood pressure results, and other factors. · Diabetes. Ask your doctor whether you should have tests for diabetes. · Vision. Experts recommend that you have yearly exams for glaucoma and other age-related eye problems. · Hearing. Tell your doctor if you notice any change in your hearing. You can have tests to find out how well you hear. · Colon cancer tests. Keep having colon cancer tests as your doctor recommends. You can have one of several types of tests. · Heart attack and stroke risk. At least every 4 to 6 years, you should have your risk for heart attack and stroke assessed. Your doctor uses factors such as your age, blood pressure, cholesterol, and whether you smoke or have diabetes to show what your risk for a heart attack or stroke is over the next 10 years. · Osteoporosis. Talk to your doctor about whether you should have a bone density test to find out whether you have thinning bones. Also ask your doctor about whether you should take calcium and vitamin D supplements. For women · Pap test and pelvic exam. You may no longer need a Pap test. Talk with your doctor about whether to stop or continue to have Pap tests. · Breast exam and mammogram. Ask how often you should have a mammogram, which is an X-ray of your breasts. A mammogram can spot breast cancer before it can be felt and when it is easiest to treat. · Thyroid disease. Talk to your doctor about whether to have your thyroid checked as part of a regular physical exam. Women have an increased chance of a thyroid problem. For men · Prostate exam. Talk to your doctor about whether you should have a blood test (called a PSA test) for prostate cancer. Experts disagree on whether men should have this test. Some experts recommend that you discuss the benefits and risks of the test with your doctor. · Abdominal aortic aneurysm. Ask your doctor whether you should have a test to check for an aneurysm. You may need a test if you ever smoked or if your parent, brother, sister, or child has had an aneurysm. When should you call for help? Watch closely for changes in your health, and be sure to contact your doctor if you have any problems or symptoms that concern you. Where can you learn more? Go to http://murali-alcira.info/. Enter U488 in the search box to learn more about \"Well Visit, Over 65: Care Instructions. \" Current as of: May 12, 2017 Content Version: 11.4 © 8464-6603 Healthwise, Incorporated. Care instructions adapted under license by [employer] (which disclaims liability or warranty for this information). If you have questions about a medical condition or this instruction, always ask your healthcare professional. [place] [address] any warranty or liability for your use of this information. Introducing [facility]! Renata Carroll introduces [employer] patient portal. Now you can access parts of your medical record, email your doctor's office, and request medication refills online. 1. In your internet browser, go to [email or web address] 2. Click on the First Time User? Click Here link in the Sign In box. You will see the New Member Sign Up page. 3. Enter your DriveFactor Access Code exactly as it appears below. You will not need to use this code after youve completed the sign-up process. If you do not sign up before the expiration date, you must request a new code. · DriveFactor Access Code: [facility] PATI Expires: 9/17/2018  9:36 AM 
 
4. Enter the last four digits of your Social Security Number (xxxx) and Date of Birth (mm/dd/yyyy) as indicated and click Submit. You will be taken to the next sign-up page. 5. Create a DriveFactor ID. This will be your DriveFactor login ID and cannot be changed, so think of one that is secure and easy to remember. 6. Create a DriveFactor password. You can change your password at any time. 7. Enter your Password Reset Question and Answer. This can be used at a later time if you forget your password. 8. Enter your e-mail address. You will receive e-mail notification when new information is available in [address] 9. Click Sign Up. You can now view and download portions of your medical record. 10. Click the Download Summary menu link to download a portable copy of your medical information. If you have questions, please visit the Frequently Asked Questions section of the DriveFactor website. Remember, DriveFactor is NOT to be used for urgent needs. For medical emergencies, dial 911. Now available from your iPhone and Android! Please provide this summary of care documentation to your next provider. Your primary care clinician is listed as Ruben Johnson. If you have any questions after today's visit, please call [phone].

## 2018-06-19 NOTE — LETTER
6/20/2018 8:12 AM 
 
Mr. [address] [id number] Dear Laly Rodriguez.: 
 
Please find your most recent results below. 1. Liver enzyme levels are higher than last time. Please follow up with hepatologist and quit drinking alcohol as I have discussed in yesterday's visit. 2. Thyroid, CBC, prostate enzyme level is normal.  
 
3. Total cholesterol level is slightly above normal range but over all cholesterol level is fine Resulted Orders CBC WITH AUTOMATED DIFF Result Value Ref Range WBC 4.0 3.4 - 10.8 x10E3/uL  
 RBC 4.27 4. 14 - 5.80 x10E6/uL HGB 14.1 13.0 - 17.7 g/dL HCT 41.0 37.5 - 51.0 % MCV 96 79 - 97 fL  
 MCH 33.0 26.6 - 33.0 pg  
 MCHC 34.4 31.5 - 35.7 g/dL  
 RDW 14.5 12.3 - 15.4 % PLATELET 626 352 - 087 x10E3/uL NEUTROPHILS 39 Not Estab. % Lymphocytes 39 Not Estab. % MONOCYTES 14 Not Estab. % EOSINOPHILS 7 Not Estab. % BASOPHILS 1 Not Estab. %  
 ABS. NEUTROPHILS 1.6 1.4 - 7.0 x10E3/uL Abs Lymphocytes 1.6 0.7 - 3.1 x10E3/uL  
 ABS. MONOCYTES 0.6 0.1 - 0.9 x10E3/uL  
 ABS. EOSINOPHILS 0.3 0.0 - 0.4 x10E3/uL  
 ABS. BASOPHILS 0.0 0.0 - 0.2 x10E3/uL IMMATURE GRANULOCYTES 0 Not Estab. %  
 ABS. IMM. GRANS. 0.0 0.0 - 0.1 x10E3/uL Narrative Performed at:  [address]  [id number] : Arie Holt MD, Phone:  [phone] METABOLIC PANEL, COMPREHENSIVE Result Value Ref Range Glucose 92 65 - 99 mg/dL BUN 17 8 - 27 mg/dL Creatinine 1.21 0.76 - 1.27 mg/dL GFR est non-AA 62 >59 mL/min/1.73 GFR est AA 72 >59 mL/min/1.73  
 BUN/Creatinine ratio 14 10 - 24 Sodium 141 134 - 144 mmol/L Potassium 4.5 3.5 - 5.2 mmol/L Chloride 101 96 - 106 mmol/L  
 CO2 24 20 - 29 mmol/L Comment: **Please note reference interval change** Calcium 9.3 8.6 - 10.2 mg/dL Protein, total 7.6 6.0 - 8.5 g/dL Albumin 4.4 3.6 - 4.8 g/dL GLOBULIN, TOTAL 3.2 1.5 - 4.5 g/dL A-G Ratio 1.4 1.2 - 2.2 Bilirubin, total 1.1 0.0 - 1.2 mg/dL Alk. phosphatase 38 (L) 39 - 117 IU/L  
 AST (SGOT) 112 (H) 0 - 40 IU/L  
 ALT (SGPT) 72 (H) 0 - 44 IU/L Narrative Performed at:  [address]  [id number] : Michelle Cohen MD, Phone:  [phone] LIPID PANEL Result Value Ref Range Cholesterol, total 228 (H) 100 - 199 mg/dL Triglyceride 56 0 - 149 mg/dL HDL Cholesterol 145 >39 mg/dL VLDL, calculated 11 5 - 40 mg/dL LDL, calculated 72 0 - 99 mg/dL Narrative Performed at:  [address]  [id number] : Michelle Cohen MD, Phone:  [phone] TSH AND FREE T4 Result Value Ref Range TSH 1.430 0.450 - 4.500 uIU/mL T4, Free 1.25 0.82 - 1.77 ng/dL Narrative Performed at:  [address]  [id number] : Michelle Cohen MD, Phone:  [phone] PSA W/ REFLX FREE PSA Result Value Ref Range Prostate Specific Ag 0.2 0.0 - 4.0 ng/mL Comment:  
   Roche ECLIA methodology. According to the American Urological Association, Serum PSA should 
decrease and remain at undetectable levels after radical 
prostatectomy. The AUA defines biochemical recurrence as an initial 
PSA value 0.2 ng/mL or greater followed by a subsequent confirmatory PSA value 0.2 ng/mL or greater. Values obtained with different assay methods or kits cannot be used 
interchangeably. Results cannot be interpreted as absolute evidence 
of the presence or absence of malignant disease. Reflex Criteria Comment Comment:  
   The percent free PSA is performed on a reflex basis only when the 
total PSA is between 4.0 and 10.0 ng/mL. Narrative Performed at:  [address]  [id number] : Zoraida Carnes MD, Phone:  [phone] CVD REPORT Result Value Ref Range INTERPRETATION Note Comment:  
   Supplemental report is available. Narrative Performed at:  [address] [place] [address]  [id number] : Niki Olguin MD, Phone:  [phone] RECOMMENDATIONS: 
 
Work on diet and exercise. Keep up the good work! Please call me if you have any questions: [phone] Sincerely, 
 
 
Ignacio Miller MD

## 2018-06-19 NOTE — PATIENT INSTRUCTIONS

## 2018-06-20 ENCOUNTER — TELEPHONE (OUTPATIENT)
Dept: INTERNAL MEDICINE CLINIC | Age: 68
End: 2018-06-20

## 2018-06-20 LAB
ALBUMIN SERPL-MCNC: 4.4 G/DL (ref 3.6–4.8)
ALBUMIN/GLOB SERPL: 1.4 {RATIO} (ref 1.2–2.2)
ALP SERPL-CCNC: 38 IU/L (ref 39–117)
ALT SERPL-CCNC: 72 IU/L (ref 0–44)
AST SERPL-CCNC: 112 IU/L (ref 0–40)
BASOPHILS # BLD AUTO: 0 X10E3/UL (ref 0–0.2)
BASOPHILS NFR BLD AUTO: 1 %
BILIRUB SERPL-MCNC: 1.1 MG/DL (ref 0–1.2)
BUN SERPL-MCNC: 17 MG/DL (ref 8–27)
BUN/CREAT SERPL: 14 (ref 10–24)
CALCIUM SERPL-MCNC: 9.3 MG/DL (ref 8.6–10.2)
CHLORIDE SERPL-SCNC: 101 MMOL/L (ref 96–106)
CHOLEST SERPL-MCNC: 228 MG/DL (ref 100–199)
CO2 SERPL-SCNC: 24 MMOL/L (ref 20–29)
CREAT SERPL-MCNC: 1.21 MG/DL (ref 0.76–1.27)
EOSINOPHIL # BLD AUTO: 0.3 X10E3/UL (ref 0–0.4)
EOSINOPHIL NFR BLD AUTO: 7 %
ERYTHROCYTE [DISTWIDTH] IN BLOOD BY AUTOMATED COUNT: 14.5 % (ref 12.3–15.4)
GFR SERPLBLD CREATININE-BSD FMLA CKD-EPI: 62 ML/MIN/1.73
GFR SERPLBLD CREATININE-BSD FMLA CKD-EPI: 72 ML/MIN/1.73
GLOBULIN SER CALC-MCNC: 3.2 G/DL (ref 1.5–4.5)
GLUCOSE SERPL-MCNC: 92 MG/DL (ref 65–99)
HCT VFR BLD AUTO: 41 % (ref 37.5–51)
HDLC SERPL-MCNC: 145 MG/DL
HGB BLD-MCNC: 14.1 G/DL (ref 13–17.7)
IMM GRANULOCYTES # BLD: 0 X10E3/UL (ref 0–0.1)
IMM GRANULOCYTES NFR BLD: 0 %
INTERPRETATION, 910389: NORMAL
LDLC SERPL CALC-MCNC: 72 MG/DL (ref 0–99)
LYMPHOCYTES # BLD AUTO: 1.6 X10E3/UL (ref 0.7–3.1)
LYMPHOCYTES NFR BLD AUTO: 39 %
MCH RBC QN AUTO: 33 PG (ref 26.6–33)
MCHC RBC AUTO-ENTMCNC: 34.4 G/DL (ref 31.5–35.7)
MCV RBC AUTO: 96 FL (ref 79–97)
MONOCYTES # BLD AUTO: 0.6 X10E3/UL (ref 0.1–0.9)
MONOCYTES NFR BLD AUTO: 14 %
NEUTROPHILS # BLD AUTO: 1.6 X10E3/UL (ref 1.4–7)
NEUTROPHILS NFR BLD AUTO: 39 %
PLATELET # BLD AUTO: 194 X10E3/UL (ref 150–379)
POTASSIUM SERPL-SCNC: 4.5 MMOL/L (ref 3.5–5.2)
PROT SERPL-MCNC: 7.6 G/DL (ref 6–8.5)
PSA SERPL-MCNC: 0.2 NG/ML (ref 0–4)
RBC # BLD AUTO: 4.27 X10E6/UL (ref 4.14–5.8)
REFLEX CRITERIA: NORMAL
SODIUM SERPL-SCNC: 141 MMOL/L (ref 134–144)
T4 FREE SERPL-MCNC: 1.25 NG/DL (ref 0.82–1.77)
TRIGL SERPL-MCNC: 56 MG/DL (ref 0–149)
TSH SERPL DL<=0.005 MIU/L-ACNC: 1.43 UIU/ML (ref 0.45–4.5)
VLDLC SERPL CALC-MCNC: 11 MG/DL (ref 5–40)
WBC # BLD AUTO: 4 X10E3/UL (ref 3.4–10.8)

## 2018-06-20 NOTE — TELEPHONE ENCOUNTER
Discussed results and recommendations. Pt verbalized his understanding and request that results be mailed to his home.

## 2018-06-20 NOTE — TELEPHONE ENCOUNTER
----- Message from Winnie Whitfield MD sent at 6/20/2018  8:02 AM EDT -----  Please call patient:     1. Liver enzyme levels are higher than last time. Pl follow up with hepatologist and quit drinking alcohol as I have discussed in yesterday's visit. 2. Thyroid, CBC, prostate enzyme level is normal.    3. Total cholesterol level is slightly above normal range but over all cholesterol level is fine.

## 2018-06-20 NOTE — PROGRESS NOTES
Please call patient:     1. Liver enzyme levels are higher than last time. Pl follow up with hepatologist and quit drinking alcohol as I have discussed in yesterday's visit. 2. Thyroid, CBC, prostate enzyme level is normal.    3. Total cholesterol level is slightly above normal range but over all cholesterol level is fine.

## 2018-06-20 NOTE — PROGRESS NOTES
Subjective:   Indio Gilman is a 79 y.o. male presenting for his annual checkup. He is on Diltiazem 180 mg. BP has in lower range and pt feels fatigue all the time. He has not made appointment for Hep C Rx as advised. He was treated for Hep C years ago. Has been following up with psychiatrist for Suboxone Rx.      ROS:  Feels tired. No dyspnea or chest pain on exertion. No abdominal pain, change in bowel habits, black or bloody stools. No urinary tract or prostatic symptoms. No neurological complaints. Specific concerns today: see HPI. 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 30 Cap 3    fluticasone (FLONASE) 50 mcg/actuation nasal spray 2 Sprays by Both Nostrils route daily as needed for Rhinitis or Allergies. Indications: ALLERGIC RHINITIS      buprenorphine-naloxone (SUBOXONE) 8-2 mg Subl 2 Tabs by SubLINGual route daily. (Patient taking differently: 1.5 Tabs by SubLINGual route daily.) 60 Tab 2    multivitamin (ONE A DAY) tablet Take 1 Tab by mouth daily.  ergocalciferol (ERGOCALCIFEROL) 50,000 unit capsule Take 1 Cap by mouth every seven (7) days.  12 Cap 3     No Known Allergies  Past Medical History:   Diagnosis Date    Hepatitis C     Hepatitis C 8/27/2010    Hypertension     PUD (peptic ulcer disease)     Seizures ([facility] [address]     as a child-none since ~1976     Past Surgical History:   Procedure Laterality Date    HX APPENDECTOMY      HX GI      surgical repair of bleeding ulcer    HX GI      colon resection    HX HEENT      T&A    HX KNEE ARTHROSCOPY  1996    right knee    HX ORTHOPAEDIC  2003 & 2005    left knee     Family History   Problem Relation Age of Onset    Heart Disease Mother     Diabetes Father     Diabetes Sister     Diabetes Paternal Grandmother      Social History   Substance Use Topics    Smoking status: Former Smoker    Smokeless tobacco: Never Used      Comment: quit May 2010    Alcohol use 0.5 oz/week     1 Cans of beer per week      Comment: occasional beer. Lab Results  Component Value Date/Time   Cholesterol, total 217 (H) 06/08/2017 09:25 AM   HDL Cholesterol 128 06/08/2017 09:25 AM   LDL, calculated 80 06/08/2017 09:25 AM   Triglyceride 45 06/08/2017 09:25 AM     Lab Results  Component Value Date/Time   ALT (SGPT) 52 (H) 06/08/2017 09:25 AM   AST (SGOT) 70 (H) 06/08/2017 09:25 AM   Alk.  phosphatase 53 06/08/2017 09:25 AM   Bilirubin, total 0.6 06/08/2017 09:25 AM   Albumin 4.7 06/08/2017 09:25 AM   Protein, total 8.0 06/08/2017 09:25 AM   INR 1.1 02/08/2017 05:34 AM   Prothrombin time 11.0 02/08/2017 05:34 AM   PLATELET 360 05/49/1984 09:25 AM       Lab Results  Component Value Date/Time   GFR est non-AA 92 06/08/2017 09:25 AM   GFRNA, POC >60 07/08/2010 06:57 PM   GFR est  06/08/2017 09:25 AM   GFRAA, POC >60 07/08/2010 06:57 PM   Creatinine 0.83 06/08/2017 09:25 AM   Creatinine (POC) 0.9 07/08/2010 06:57 PM   BUN 12 06/08/2017 09:25 AM   Sodium 142 06/08/2017 09:25 AM   Potassium 4.1 06/08/2017 09:25 AM   Chloride 98 06/08/2017 09:25 AM   CO2 27 06/08/2017 09:25 AM     Lab Results  Component Value Date/Time   Prostate Specific Ag 0.2 06/08/2017 09:25 AM   Prostate Specific Ag 0.2 06/08/2016 07:47 AM   Prostate Specific Ag 0.2 05/18/2015 09:20 AM     Lab Results  Component Value Date/Time   TSH 2.240 06/08/2017 09:25 AM   T4, Free 1.16 06/08/2017 09:25 AM      Lab Results   Component Value Date/Time    Hemoglobin A1c 5.6 06/08/2017 09:25 AM         Objective:     Visit Vitals    /71 (BP 1 Location: Left arm, BP Patient Position: Sitting)    Pulse 64    Temp 97.2 °F (36.2 °C) (Temporal)    Resp 18    Ht 5' 10\" (1.778 m)    Wt 173 lb (78.5 kg)    SpO2 98%    BMI 24.82 kg/m2     The patient appears well, alert, oriented x 3, in no distress. ENT normal.  Neck supple. No adenopathy or thyromegaly. SHAW. Lungs are clear, good air entry, no wheezes, rhonchi or rales. S1 and S2 normal, no murmurs, regular rate and rhythm. Abdomen is soft without tenderness, guarding, mass or organomegaly.  exam: deferred. Extremities show no edema, normal peripheral pulses. Neurological is normal without focal findings. Assessment/Plan:   healthy adult male  follow low fat diet, follow low salt diet, routine labs ordered, call if any problems. ICD-10-CM ICD-9-CM    1. Well adult on routine health check Z00.00 V70.0 CBC WITH AUTOMATED DIFF      METABOLIC PANEL, COMPREHENSIVE      LIPID PANEL      TSH AND FREE T4      PSA W/ REFLX FREE PSA   2. Screening for colorectal cancer Z12.11 V76.51 REFERRAL FOR COLONOSCOPY    Z12.12     3. Essential hypertension I10 401.9 dilTIAZem CD (CARDIZEM CD) 120 mg ER capsule   4. Hepatitis C virus infection without hepatic coma, unspecified chronicity B19.20 070.70 REFERRAL TO LIVER HEPATOLOGY     Diagnoses and all orders for this visit:    1. Well adult on routine health check  -     CBC WITH AUTOMATED DIFF  -     METABOLIC PANEL, COMPREHENSIVE  -     LIPID PANEL  -     TSH AND FREE T4  -     PSA W/ REFLX FREE PSA    2. Essential hypertension  -    Cut down  dilTIAZem CD (CARDIZEM CD) 120 mg ER capsule; Take 1 Cap by mouth daily. Follow up in 6 weeks. 3. Screening for colorectal cancer  -     REFERRAL FOR COLONOSCOPY    4. Hepatitis C virus infection without hepatic coma, unspecified chronicity  -   Urged to have a follow up with  [address] [facility] [address] ref [facility]        - strongly encouraged to quit drinking alcohol. Follow-up Disposition:  Return in about 6 weeks (around 7/31/2018) for Bring BP log book. .  reviewed diet, exercise and weight control  reviewed medications and side effects in detail  use of aspirin to prevent MI and TIA's discussed.

## 2018-08-05 RX ORDER — DILTIAZEM HYDROCHLORIDE 180 MG/1
CAPSULE, COATED, EXTENDED RELEASE ORAL
Qty: 90 CAP | Refills: 1 | Status: SHIPPED | OUTPATIENT
Start: 2018-08-05 | End: 2018-11-07 | Stop reason: DRUGHIGH

## 2018-09-18 ENCOUNTER — PATIENT OUTREACH (OUTPATIENT)
Dept: PRIMARY CARE CLINIC | Age: 68
End: 2018-09-18

## 2018-09-18 NOTE — PROGRESS NOTES
NN attempted to reach the patient per Dr Vivi Ahmadi request to encourage he schedule his follow up with Hepatology. His referral was placed in June and it appears that he has not responded to attempts to schedule. NN unable to reach the patient and left VMs on both numbers provided requesting a return call with NN's contact info provided.

## 2018-11-06 DIAGNOSIS — I10 ESSENTIAL HYPERTENSION: ICD-10-CM

## 2018-11-07 RX ORDER — DILTIAZEM HYDROCHLORIDE 120 MG/1
120 CAPSULE, COATED, EXTENDED RELEASE ORAL DAILY
Qty: 30 CAP | Refills: 3 | Status: SHIPPED | OUTPATIENT
Start: 2018-11-07 | End: 2019-03-18 | Stop reason: SDUPTHER

## 2019-03-18 ENCOUNTER — OFFICE VISIT (OUTPATIENT)
Dept: PRIMARY CARE CLINIC | Age: 69
End: 2019-03-18

## 2019-03-18 VITALS
HEIGHT: 70 IN | SYSTOLIC BLOOD PRESSURE: 127 MMHG | BODY MASS INDEX: 24.82 KG/M2 | HEART RATE: 77 BPM | WEIGHT: 173.4 LBS | RESPIRATION RATE: 17 BRPM | DIASTOLIC BLOOD PRESSURE: 83 MMHG | OXYGEN SATURATION: 97 % | TEMPERATURE: 98.4 F

## 2019-03-18 DIAGNOSIS — R09.89 CHEST CONGESTION: ICD-10-CM

## 2019-03-18 DIAGNOSIS — Z23 NEED FOR 23-POLYVALENT PNEUMOCOCCAL POLYSACCHARIDE VACCINE: ICD-10-CM

## 2019-03-18 DIAGNOSIS — Z23 ENCOUNTER FOR IMMUNIZATION: ICD-10-CM

## 2019-03-18 DIAGNOSIS — J30.2 SEASONAL ALLERGIC RHINITIS, UNSPECIFIED TRIGGER: ICD-10-CM

## 2019-03-18 DIAGNOSIS — I10 ESSENTIAL HYPERTENSION: Primary | ICD-10-CM

## 2019-03-18 RX ORDER — FLUTICASONE PROPIONATE 50 MCG
2 SPRAY, SUSPENSION (ML) NASAL
Qty: 3 BOTTLE | Refills: 0 | Status: SHIPPED | OUTPATIENT
Start: 2019-03-18 | End: 2019-06-13 | Stop reason: SDUPTHER

## 2019-03-18 RX ORDER — DILTIAZEM HYDROCHLORIDE 120 MG/1
120 CAPSULE, COATED, EXTENDED RELEASE ORAL DAILY
Qty: 90 CAP | Refills: 1 | Status: SHIPPED | OUTPATIENT
Start: 2019-03-18 | End: 2019-09-20 | Stop reason: SDUPTHER

## 2019-03-18 NOTE — PROGRESS NOTES
Subjective:     Chief Complaint   Patient presents with    Immunization/Injection     flu shot and pneumonia shot    Chest Congestion     2-3 days        He  is a 81y.o. year old male who presents today for follow up on blood pressure as well as with some acute concern. Patient reports that about a week ago he was outside doing something and since then he has been having intermittent mild cough and chest congestion. He denies any chest pain, soa, fever, wheezing. He believe all his symptoms started because of allergy. He did not try any med for the above symptoms. Would like to get refill of Flonase. HTN: well controlled with Diltiazem 120 mg. Need refill. He is compliant with med.      He has not made appointment for Hep C Rx as advised. He states that he has a plan to make appointment soon.      Has been following up with psychiatrist for Suboxone Rx. Pertinent items are noted in HPI.   Objective:     Vitals:    03/18/19 1617   BP: 127/83   Pulse: 77   Resp: 17   Temp: 98.4 °F (36.9 °C)   TempSrc: Oral   SpO2: 97%   Weight: 173 lb 6.4 oz (78.7 kg)   Height: 5' 10\" (1.778 m)       Physical Examination: General appearance - alert, well appearing, and in no distress, oriented to person, place, and time and normal appearing weight  Mental status - alert, oriented to person, place, and time, normal mood, behavior, speech, dress, motor activity, and thought processes  Ears - bilateral TM's and external ear canals normal  Nose - normal and patent, no erythema, discharge or polyps  Mouth - mucous membranes moist, pharynx normal without lesions  Neck - supple, no significant adenopathy  Chest - clear to auscultation, no wheezes, rales or rhonchi, symmetric air entry  Heart - normal rate, regular rhythm, normal S1, S2, no murmurs, rubs, clicks or gallops    No Known Allergies   Social History     Socioeconomic History    Marital status: SINGLE     Spouse name: Not on file    Number of children: Not on file    Years of education: Not on file    Highest education level: Not on file   Tobacco Use    Smoking status: Former Smoker    Smokeless tobacco: Never Used    Tobacco comment: quit May 2010   Substance and Sexual Activity    Alcohol use: Yes     Alcohol/week: 0.5 oz     Types: 1 Cans of beer per week     Comment: occasional beer.  Drug use: No     Comment: quit 2007    Sexual activity: Not Currently      Family History   Problem Relation Age of Onset    Heart Disease Mother     Diabetes Father     Diabetes Sister     Diabetes Paternal Grandmother       Past Surgical History:   Procedure Laterality Date    HX APPENDECTOMY      HX GI      surgical repair of bleeding ulcer    HX GI      colon resection    HX HEENT      T&A    HX KNEE ARTHROSCOPY  1996    right knee    HX ORTHOPAEDIC  2003 & 2005    left knee      Past Medical History:   Diagnosis Date    Hepatitis C     Hepatitis C 8/27/2010    Hypertension     PUD (peptic ulcer disease)     Seizures ([facility] [address]     as a child-none since ~1976      Current Outpatient Medications   Medication Sig Dispense Refill    fluticasone propionate (FLONASE) 50 mcg/actuation nasal spray 2 Sprays by Both Nostrils route daily as needed for Rhinitis or Allergies. 3 Bottle 0    dilTIAZem CD (CARDIZEM CD) 120 mg ER capsule Take 1 Cap by mouth daily. 90 Cap 1    buprenorphine-naloxone (SUBOXONE) 8-2 mg Subl 2 Tabs by SubLINGual route daily. (Patient taking differently: 1.5 Tabs by SubLINGual route daily.) 60 Tab 2    multivitamin (ONE A DAY) tablet Take 1 Tab by mouth daily. Assessment/ Plan:   Diagnoses and all orders for this visit:    1. Essential hypertension  -     BP well controlled with dilTIAZem CD (CARDIZEM CD) 120 mg ER capsule; Take 1 Cap by mouth daily. 2. Chest congestion       - Mild intermittent non productive cough otherwise he is doing well. Exam is normal. Advised to take OTC Mucinex/Robitussion. Follow up if symptoms worsen. 3. Seasonal allergic rhinitis, unspecified trigger  -     fluticasone propionate (FLONASE) 50 mcg/actuation nasal spray; 2 Sprays by Both Nostrils route daily as needed for Rhinitis or Allergies. 4. Encounter for immunization  -     INFLUENZA VACCINE INACTIVATED (IIV), SUBUNIT, ADJUVANTED, IM    5. Need for 23-polyvalent pneumococcal polysaccharide vaccine  -     PNEUMOCOCCAL POLYSACCHARIDE VACCINE, 23-VALENT, ADULT OR IMMUNOSUPPRESSED PT DOSE,           Medication risks/benefits/costs/interactions/alternatives discussed with patient. Advised patient to call back or return to office if symptoms worsen/change/persist. If patient cannot reach us or should anything more severe/urgent arise he/she should proceed directly to the nearest emergency department. Discussed expected course/resolution/complications of diagnosis in detail with patient. Patient given a written after visit summary which includes her diagnoses, current medications and vitals. Patient expressed understanding with the diagnosis and plan. Follow-up Disposition:  Return in about 3 months (around 6/21/2019) for complete physical  and fasting blood work. Osmin Ramos

## 2019-08-14 ENCOUNTER — OFFICE VISIT (OUTPATIENT)
Dept: PRIMARY CARE CLINIC | Age: 69
End: 2019-08-14

## 2019-08-14 VITALS
HEIGHT: 70 IN | DIASTOLIC BLOOD PRESSURE: 87 MMHG | OXYGEN SATURATION: 97 % | TEMPERATURE: 98.4 F | BODY MASS INDEX: 24.28 KG/M2 | RESPIRATION RATE: 16 BRPM | HEART RATE: 64 BPM | SYSTOLIC BLOOD PRESSURE: 137 MMHG | WEIGHT: 169.6 LBS

## 2019-08-14 DIAGNOSIS — B19.20 HEPATITIS C VIRUS INFECTION WITHOUT HEPATIC COMA, UNSPECIFIED CHRONICITY: ICD-10-CM

## 2019-08-14 DIAGNOSIS — Z00.00 WELL ADULT ON ROUTINE HEALTH CHECK: Primary | ICD-10-CM

## 2019-08-14 DIAGNOSIS — I10 ESSENTIAL HYPERTENSION: ICD-10-CM

## 2019-08-14 NOTE — PROGRESS NOTES
Natacha Mead. is a 81 y.o. male    Chief Complaint   Patient presents with    Complete Physical     fasting       1. Have you been to the ER, urgent care clinic since your last visit? Hospitalized since your last visit? No    2. Have you seen or consulted any other health care providers outside of the [address] [place] [address] since your last visit? Include any pap smears or colon screening. No    No flowsheet data found.      Health Maintenance Due   Topic Date Due    Shingrix Vaccine Age 49> (1 of 2) 01/31/2002    GLAUCOMA SCREENING Q2Y  01/31/2017    Influenza Age 5 to Adult  08/01/2019

## 2019-08-14 NOTE — PROGRESS NOTES
Subjective:   Francisca Rosales is a 81 y.o. male presenting for his annual checkup as well as blood pressure follow up. HTN: well controlled with Diltiazem 120 mg. He is compliant with med.      He has not made appointment for Hep C Rx as advised. He states that he will make appointment soon.       Has been following up with psychiatrist for Suboxone Rx.      ROS:  Feeling well. No dyspnea or chest pain on exertion. No abdominal pain, change in bowel habits, black or bloody stools. No urinary tract or prostatic symptoms. No neurological complaints. Specific concerns today: refer to HPI. Patient Active Problem List   Diagnosis Code    Stricture of colon ([facility] [address] K56.699    Hypertension I10    Hepatitis C B19.20    Opiate dependence ([facility] [address] F11.20    Cervical pain (neck) M54.2    Vitamin D deficiency E55.9    Peptic ulcer with hemorrhage K27.4     Current Outpatient Medications   Medication Sig Dispense Refill    fluticasone propionate (FLONASE) 50 mcg/actuation nasal spray SPRAY 2 SPRAYS INTO EACH NOSTRIL EVERY DAY AS NEEDED 1 Bottle 2    dilTIAZem CD (CARDIZEM CD) 120 mg ER capsule Take 1 Cap by mouth daily. 90 Cap 1    buprenorphine-naloxone (SUBOXONE) 8-2 mg Subl 2 Tabs by SubLINGual route daily. (Patient taking differently: 1.5 Tabs by SubLINGual route daily.) 60 Tab 2    multivitamin (ONE A DAY) tablet Take 1 Tab by mouth daily.        No Known Allergies  Past Medical History:   Diagnosis Date    Hepatitis C     Hepatitis C 8/27/2010    Hypertension     PUD (peptic ulcer disease)     Seizures ([facility] [address]     as a child-none since ~1976     Past Surgical History:   Procedure Laterality Date    HX APPENDECTOMY      HX GI      surgical repair of bleeding ulcer    HX GI      colon resection    HX HEENT      T&A    HX KNEE ARTHROSCOPY  1996    right knee    HX ORTHOPAEDIC  2003 & 2005    left knee     Family History   Problem Relation Age of Onset    Heart Disease Mother     Diabetes Father  Diabetes Sister     Diabetes Paternal Grandmother      Social History     Tobacco Use    Smoking status: Former Smoker    Smokeless tobacco: Never Used    Tobacco comment: quit May 2010   Substance Use Topics    Alcohol use: Yes     Alcohol/week: 0.8 standard drinks     Types: 1 Cans of beer per week     Comment: occasional beer. Lab Results   Component Value Date/Time    WBC 4.0 06/19/2018 09:37 AM    HGB 14.1 06/19/2018 09:37 AM    HCT 41.0 06/19/2018 09:37 AM    PLATELET 676 02/11/8491 09:37 AM    MCV 96 06/19/2018 09:37 AM     Lab Results   Component Value Date/Time    Cholesterol, total 228 (H) 06/19/2018 09:37 AM    HDL Cholesterol 145 06/19/2018 09:37 AM    LDL, calculated 72 06/19/2018 09:37 AM    Triglyceride 56 06/19/2018 09:37 AM     Lab Results   Component Value Date/Time    GFR est non-AA 62 06/19/2018 09:37 AM    GFRNA, POC >60 07/08/2010 06:57 PM    GFR est AA 72 06/19/2018 09:37 AM    GFRAA, POC >60 07/08/2010 06:57 PM    Creatinine 1.21 06/19/2018 09:37 AM    Creatinine (POC) 0.9 07/08/2010 06:57 PM    BUN 17 06/19/2018 09:37 AM    Sodium 141 06/19/2018 09:37 AM    Potassium 4.5 06/19/2018 09:37 AM    Chloride 101 06/19/2018 09:37 AM    CO2 24 06/19/2018 09:37 AM     Lab Results   Component Value Date/Time    Prostate Specific Ag 0.2 06/19/2018 09:37 AM    Prostate Specific Ag 0.2 06/08/2017 09:25 AM    Prostate Specific Ag 0.2 06/08/2016 07:47 AM     Lab Results   Component Value Date/Time    TSH 1.430 06/19/2018 09:37 AM    T4, Free 1.25 06/19/2018 09:37 AM      Lab Results   Component Value Date/Time    Hemoglobin A1c 5.6 06/08/2017 09:25 AM         Objective:     Visit Vitals  /87 (BP 1 Location: Left arm, BP Patient Position: Sitting)   Pulse 64   Temp 98.4 °F (36.9 °C) (Oral)   Resp 16   Ht 5' 10\" (1.778 m)   Wt 169 lb 9.6 oz (76.9 kg)   SpO2 97%   BMI 24.34 kg/m²     The patient appears well, alert, oriented x 3, in no distress. ENT normal.  Neck supple.  No adenopathy or thyromegaly. SHAW. Lungs are clear, good air entry, no wheezes, rhonchi or rales. S1 and S2 normal, no murmurs, regular rate and rhythm. Abdomen is soft without tenderness, guarding, mass or organomegaly.  exam: deferred. Extremities show no edema, normal peripheral pulses. Neurological is normal without focal findings. Assessment/Plan:   healthy adult male  follow low fat diet, follow low salt diet, continue present plan, routine labs ordered, call if any problems. ICD-10-CM ICD-9-CM    1. Well adult on routine health check Z00.00 V70.0 PSA W/ REFLX FREE PSA      LIPID PANEL      METABOLIC PANEL, COMPREHENSIVE      HEMOGLOBIN A1C WITH EAG      CBC WITH AUTOMATED DIFF   2. Hepatitis C virus infection without hepatic coma, unspecified chronicity B19.20 070.70 REFERRAL TO LIVER HEPATOLOGY      HEPATITIS C QT BY PCR WITH REFLEX GENOTYPE      CANCELED: HEPATITIS C AB, RFLX TO QT BY PCR   3. Essential hypertension I10 401.9      Diagnoses and all orders for this visit:    1. Well adult on routine health check  -     PSA W/ REFLX FREE PSA  -     LIPID PANEL  -     METABOLIC PANEL, COMPREHENSIVE  -     HEMOGLOBIN A1C WITH EAG  -     CBC WITH AUTOMATED DIFF    2. Hepatitis C virus infection without hepatic coma, unspecified chronicity  -     REFERRAL TO LIVER HEPATOLOGY  -     HEPATITIS C QT BY PCR WITH REFLEX GENOTYPE              Strongly encouraged him to make appointment. 3. Essential hypertension        - BP well controlled with Diltiazem. Follow-up and Dispositions    · Return in about 6 months (around 2/14/2020) for BP.       current treatment plan is effective, no change in therapy  reviewed diet, exercise and weight control  reviewed medications and side effects in detail.

## 2019-08-14 NOTE — PATIENT INSTRUCTIONS

## 2019-08-15 LAB
ALBUMIN SERPL-MCNC: 4 G/DL (ref 3.6–4.8)
ALBUMIN/GLOB SERPL: 1.2 {RATIO} (ref 1.2–2.2)
ALP SERPL-CCNC: 42 IU/L (ref 39–117)
ALT SERPL-CCNC: 50 IU/L (ref 0–44)
AST SERPL-CCNC: 74 IU/L (ref 0–40)
BASOPHILS # BLD AUTO: 0 X10E3/UL (ref 0–0.2)
BASOPHILS NFR BLD AUTO: 1 %
BILIRUB SERPL-MCNC: 0.9 MG/DL (ref 0–1.2)
BUN SERPL-MCNC: 13 MG/DL (ref 8–27)
BUN/CREAT SERPL: 15 (ref 10–24)
CALCIUM SERPL-MCNC: 9.4 MG/DL (ref 8.6–10.2)
CHLORIDE SERPL-SCNC: 101 MMOL/L (ref 96–106)
CHOLEST SERPL-MCNC: 223 MG/DL (ref 100–199)
CO2 SERPL-SCNC: 26 MMOL/L (ref 20–29)
CREAT SERPL-MCNC: 0.86 MG/DL (ref 0.76–1.27)
EOSINOPHIL # BLD AUTO: 0.1 X10E3/UL (ref 0–0.4)
EOSINOPHIL NFR BLD AUTO: 2 %
ERYTHROCYTE [DISTWIDTH] IN BLOOD BY AUTOMATED COUNT: 12.3 % (ref 12.3–15.4)
EST. AVERAGE GLUCOSE BLD GHB EST-MCNC: 108 MG/DL
GLOBULIN SER CALC-MCNC: 3.4 G/DL (ref 1.5–4.5)
GLUCOSE SERPL-MCNC: 92 MG/DL (ref 65–99)
HBA1C MFR BLD: 5.4 % (ref 4.8–5.6)
HCT VFR BLD AUTO: 40.6 % (ref 37.5–51)
HDLC SERPL-MCNC: 136 MG/DL
HGB BLD-MCNC: 13.9 G/DL (ref 13–17.7)
IMM GRANULOCYTES # BLD AUTO: 0 X10E3/UL (ref 0–0.1)
IMM GRANULOCYTES NFR BLD AUTO: 0 %
LDLC SERPL CALC-MCNC: 77 MG/DL (ref 0–99)
LYMPHOCYTES # BLD AUTO: 1.2 X10E3/UL (ref 0.7–3.1)
LYMPHOCYTES NFR BLD AUTO: 39 %
MCH RBC QN AUTO: 33 PG (ref 26.6–33)
MCHC RBC AUTO-ENTMCNC: 34.2 G/DL (ref 31.5–35.7)
MCV RBC AUTO: 96 FL (ref 79–97)
MONOCYTES # BLD AUTO: 0.6 X10E3/UL (ref 0.1–0.9)
MONOCYTES NFR BLD AUTO: 19 %
NEUTROPHILS # BLD AUTO: 1.2 X10E3/UL (ref 1.4–7)
NEUTROPHILS NFR BLD AUTO: 39 %
PLATELET # BLD AUTO: 191 X10E3/UL (ref 150–450)
POTASSIUM SERPL-SCNC: 4.9 MMOL/L (ref 3.5–5.2)
PROT SERPL-MCNC: 7.4 G/DL (ref 6–8.5)
PSA SERPL-MCNC: 0.2 NG/ML (ref 0–4)
RBC # BLD AUTO: 4.21 X10E6/UL (ref 4.14–5.8)
REFLEX CRITERIA: NORMAL
SODIUM SERPL-SCNC: 142 MMOL/L (ref 134–144)
TRIGL SERPL-MCNC: 50 MG/DL (ref 0–149)
VLDLC SERPL CALC-MCNC: 10 MG/DL (ref 5–40)
WBC # BLD AUTO: 3.1 X10E3/UL (ref 3.4–10.8)

## 2019-08-15 NOTE — PROGRESS NOTES
Please mail letter;    1. Prostate enzyme, CBC, kidney function is fine. 2. No diabetes. 3. Total cholesterol level is slightly above normal range but otherwise levels are normal.    4. Liver enzyme slightly better than last time but still elevated. Please follow up with hepatologist as advised.

## 2019-08-18 LAB
HCV GENOTYPE: NORMAL
HCV GENTYP SERPL NAA+PROBE: NORMAL
HCV RNA SERPL NAA+PROBE-ACNC: NORMAL IU/ML
HCV RNA SERPL NAA+PROBE-LOG IU: 5.53 LOG10 IU/ML
PLEASE NOTE, 550474: NORMAL
TEST INFORMATION: NORMAL

## 2019-08-18 NOTE — PROGRESS NOTES
Please call patient:    Patient has established hx of hepatitis C. He needs to see liver specialist.  Was referred to Dr. Des Cartagena at his last visit. Has he set up appointment? If not, please encourage him to do so.

## 2019-08-19 ENCOUNTER — TELEPHONE (OUTPATIENT)
Dept: PRIMARY CARE CLINIC | Age: 69
End: 2019-08-19

## 2019-08-19 NOTE — TELEPHONE ENCOUNTER
----- Message from Susana Mohan NP sent at 7/74/5272  2:56 PM EDT -----  Please call patient:    Patient has established hx of hepatitis C. He needs to see liver specialist.  Was referred to Dr. Yoshi Rodriguez at his last visit. Has he set up appointment? If not, please encourage him to do so.

## 2019-08-19 NOTE — TELEPHONE ENCOUNTER
Patient notified and verbalized his understanding. States that he has not made an appt with hepatologist, he was waiting for results. Pt will do so now.

## 2019-09-20 DIAGNOSIS — I10 ESSENTIAL HYPERTENSION: ICD-10-CM

## 2019-09-20 RX ORDER — DILTIAZEM HYDROCHLORIDE 120 MG/1
CAPSULE, COATED, EXTENDED RELEASE ORAL
Qty: 90 CAP | Refills: 1 | Status: SHIPPED | OUTPATIENT
Start: 2019-09-20 | End: 2020-03-17 | Stop reason: SDUPTHER

## 2019-10-24 DIAGNOSIS — J30.2 SEASONAL ALLERGIC RHINITIS, UNSPECIFIED TRIGGER: ICD-10-CM

## 2019-10-24 RX ORDER — FLUTICASONE PROPIONATE 50 MCG
SPRAY, SUSPENSION (ML) NASAL
Qty: 1 BOTTLE | Refills: 2 | Status: SHIPPED | OUTPATIENT
Start: 2019-10-24 | End: 2020-02-17 | Stop reason: SDUPTHER

## 2020-02-17 DIAGNOSIS — J30.2 SEASONAL ALLERGIC RHINITIS, UNSPECIFIED TRIGGER: ICD-10-CM

## 2020-02-17 RX ORDER — FLUTICASONE PROPIONATE 50 MCG
SPRAY, SUSPENSION (ML) NASAL
Qty: 1 BOTTLE | Refills: 2 | Status: SHIPPED | OUTPATIENT
Start: 2020-02-17 | End: 2020-10-19 | Stop reason: SDUPTHER

## 2020-03-06 ENCOUNTER — OFFICE VISIT (OUTPATIENT)
Dept: HEMATOLOGY | Age: 70
End: 2020-03-06

## 2020-03-06 VITALS
OXYGEN SATURATION: 98 % | BODY MASS INDEX: 24.05 KG/M2 | RESPIRATION RATE: 17 BRPM | HEIGHT: 70 IN | SYSTOLIC BLOOD PRESSURE: 151 MMHG | TEMPERATURE: 97.2 F | HEART RATE: 87 BPM | DIASTOLIC BLOOD PRESSURE: 94 MMHG | WEIGHT: 168 LBS

## 2020-03-06 DIAGNOSIS — B18.2 CHRONIC HEPATITIS C WITHOUT HEPATIC COMA (HCC): Primary | ICD-10-CM

## 2020-03-06 DIAGNOSIS — Z90.49 H/O PARTIAL RESECTION OF COLON: ICD-10-CM

## 2020-03-06 PROBLEM — Z87.11 H/O GASTRIC ULCER: Status: ACTIVE | Noted: 2017-02-09

## 2020-03-06 NOTE — PROGRESS NOTES
[address] [place] [address], Alanna KUNZ Nilsa Purpura, MD Eligio Drone, PA-C Leonora Makua, [facility]     April S Hans, [facility]   Mihai Miller FNP-NAKIA Ivan, [facility]       Betito Sanchez Karri [address] [place] [address]    at [address], [address] [place] [address], [facility] 22.    [phone]    FAX: [address] [place] [address], [address], [address]    [phone]    FAX: [phone]       Patient Care Team:  Darryle Rough, MD as PCP - General (Family Practice)  Darryle Rough, MD as PCP - [facility] Provider  Marissa Albarado MD (Psychiatry)  Pretty Arora MD (Cardiology)  Kesha Sharp MD (Gastroenterology)      Problem List  Date Reviewed: 3/6/2020          Codes Class Noted    H/O gastric ulcer ICD-10-CM: Z87.19  ICD-9-CM: V12.79  2/9/2017        Vitamin D deficiency ICD-10-CM: E55.9  ICD-9-CM: 268.9  5/19/2015        Cervical pain (neck) ICD-10-CM: M54.2  ICD-9-CM: 723.1  3/13/2015        Encounter for monitoring Suboxone maintenance therapy ICD-10-CM: Z51.81, Z79.899  ICD-9-CM: V58.83, V58.69  8/3/2011        H/O partial resection of colon ICD-10-CM: Z90.49  ICD-9-CM: V45.72  8/27/2010    Overview Signed 3/6/2020  8:58 AM by Naila Villanueva MD     Due to diverticulitis and colon stricture             Hypertension ICD-10-CM: I10  ICD-9-CM: 401.9  8/27/2010        Hepatitis C ICD-10-CM: B19.20  ICD-9-CM: 070.70  8/27/2010    Overview Signed 6/8/2017  9:43 AM by Darryle Rough, MD     Was treated in 2000 per patient. The clinicians listed above have asked me to see Ximena Colindres in consultation regarding chronic HCV and its management.   All medical records sent by the referring physicians were reviewed     The patient is a 78 y.o. Black male who was found to have abnormalities in liver chemistries and subsequently tested positive for chronic HCV in 1990s. Risk factors for acquiring HCV are IV drug use in 1960s  - 2003. There was no history of acute icteric hepatitis at the time of these risk factors. Imaging of the liver was not recently performed. An assessment of liver fibrosis with biopsy or elastography has not been performed. The patient was treated with peginterferon and ribavirin in early 2000s. The patient has the following symptoms which are thought to be due to the liver disease:  fatigue,     The patient is not currently experiencing the following symptoms of liver disease:  pain in the right side over the liver,     The patient completes all daily activities without any functional limitations. ASSESSMENT AND PLAN:  Chronic HCV   Chronic HCV of unclear severity. Liver transaminases are elevated. ALP is normal.  Liver function is normal.  The platelet count is normal.      Based upon laboratory studies and imaging  the patient does not appear to have advanced liver disease. Have performed laboratory testing to monitor liver function and degree of liver injury. This included BMP, hepatic panel, CBC with platelet count,     Will perform and/or review results of HCV viral load, HCV genotype to define the specific treatment and duration of treatment that will be required. Will perform serologic and virologic studies to assess for other causes of chronic liver disease. Will perform imaging of the liver with ultrasound. The need to perform an assessment of liver fibrosis was discussed with the patient. The Fibroscan can assess liver fibrosis and determine if a patient has advanced fibrosis or cirrhosis without the need for liver biopsy. This will be performed at the next office visit.       Chronic HCV Treatment  The patient has not been treated for HCV. The patient has HCV genotype 1A. The patient should be treated with Harvoni (sofasbuvir and ledipasvir), Mavyret (glecaprevir and piprentasvir) or   Epclusa (sofosbuvir and valpitasvir). The SVR/cure rate for is over 95%. Screening for Hepatocellular Carcinoma  HCC screening is not necessary if the patient has no evidence of cirrhosis. Treatment of other medical problems in patients with chronic liver disease  There are no contraindications for the patient to take most medications that are necessary for treatment of other medical issues. Counseling for alcohol in patients with chronic liver disease  The patient was counseled regarding alcohol consumption and the effect of alcohol on chronic liver disease. The patient does not consume any significant amount of alcohol. Vaccinations   Vaccination for viral hepatitis A and B is not needed. The patient has serologic evidence of prior exposure or vaccination with immunity. Routine vaccinations against other bacterial and viral agents can be performed as indicated. Annual flu vaccination should be administered if indicated. ALLERGIES  No Known Allergies    MEDICATIONS  Current Outpatient Medications   Medication Sig    fluticasone propionate (FLONASE) 50 mcg/actuation nasal spray SPRAY 2 SPRAYS INTO EACH NOSTRIL EVERY DAY AS NEEDED    dilTIAZem CD (CARDIZEM CD) 120 mg ER capsule TAKE 1 CAPSULE BY MOUTH EVERY DAY    buprenorphine-naloxone (SUBOXONE) 8-2 mg Subl 2 Tabs by SubLINGual route daily. (Patient taking differently: 1.5 Tabs by SubLINGual route daily.)    multivitamin (ONE A DAY) tablet Take 1 Tab by mouth daily. No current facility-administered medications for this visit. SYSTEM REVIEW NOT RELATED TO LIVER DISEASE OR REVIEWED ABOVE:  Constitution systems: Negative for fever, chills, weight gain, weight loss. Eyes: Negative for visual changes. ENT: Negative for sore throat, painful swallowing.    Respiratory: Negative for cough, hemoptysis, SOB. Cardiology: Negative for chest pain, palpitations. GI:  Negative for constipation or diarrhea. : Negative for urinary frequency, dysuria, hematuria, nocturia. Skin: Negative for rash. Hematology: Negative for easy bruising, blood clots. Musculo-skelatal: Negative for back pain, muscle pain, weakness. Neurologic: Negative for headaches, dizziness, vertigo, memory problems not related to HE. Psychology: Negative for anxiety, depression. FAMILY HISTORY:  The father  of ESRD. The mother is alive at age 80 years. Has/had the following chronic disease(s): Dementia  There is no family history of liver disease. SOCIAL HISTORY:  The patient is . The patient has 5 children, and alot of grandchildren. The patient stopped using tobacco products in . The patient consumes 1 alcoholic beverages per day   The patient currently works full time for [employer] in [email or web address]. PHYSICAL EXAMINATION:  Visit Vitals  BP (!) 151/94 (BP 1 Location: Left arm, BP Patient Position: Sitting)   Pulse 87   Temp 97.2 °F (36.2 °C) (Tympanic)   Resp 17   Ht 5' 10\" (1.778 m)   Wt 168 lb (76.2 kg)   SpO2 98%   BMI 24.11 kg/m²     General: No acute distress. Eyes: Sclera anicteric. ENT: No oral lesions. Thyroid normal.  Nodes: No adenopathy. Skin: No spider angiomata. No jaundice. No palmar erythema. Respiratory: Lungs clear to auscultation. Cardiovascular: Regular heart rate. No murmurs. No JVD. Abdomen: Soft non-tender. Liver size normal to percussion/palpation. Spleen not palpable. No obvious ascites. Extremities: No edema. No muscle wasting. No gross arthritic changes. Neurologic: Alert and oriented. Cranial nerves grossly intact. No asterixis.     LABORATORY STUDIES:  Liver [place] of [address] [place] [address] & Units 3/6/2020   WBC 3.4 - 10.8 x10E3/uL 3.5   ANC 1.4 - 7.0 x10E3/uL 1.5   HGB 13.0 - 17.7 g/dL 14.5    - 450 x10E3/uL 203   INR 0.9 - 1.1      AST 0 - 40 IU/L 49 (H)   ALT 0 - 44 IU/L 34   Alk Phos 39 - 117 IU/L 43   Bili, Total 0.0 - 1.2 mg/dL 0.6   Bili, Direct 0.00 - 0.40 mg/dL 0.24   Albumin 3.8 - 4.8 g/dL 4.5   BUN 8 - 27 mg/dL 12   Creat 0.76 - 1.27 mg/dL 1.06   Na 134 - 144 mmol/L 143   K 3.5 - 5.2 mmol/L 4.7   Cl 96 - 106 mmol/L 101   CO2 20 - 29 mmol/L 26   Glucose 65 - 99 mg/dL 95     SEROLOGIES:  Serologies Latest Ref Rng & Units 3/6/2020 8/14/2019   Hep A Ab, Total Negative Positive (A)    Hep B Surface Ag Negative Negative    Hep B Core Ab, Total Negative Positive (A)    Hep B Surface AB QL  Non Reactive    Hep C Genotype   1a   HCV RT-PCR, Quant IU/mL 99,700 335,000   HCV Log10 log10 IU/mL  5.525     LIVER HISTOLOGY:  Not available or performed    ENDOSCOPIC PROCEDURES:  Not available or performed    RADIOLOGY:  3/2020. Ultrasound of liver. Echogenic consistent with fatty liver. No liver mass lesions. No dilated bile ducts. No ascites. OTHER TESTING:  Not available or performed    FOLLOW-UP:  All of the issues listed above in the Assessment and Plan were discussed with the patient. All questions were answered. The patient expressed a clear understanding of the above. [address] [place] [address] in 4 weeks for Fibroscan to review all data and determine the treatment plan.       MD Stephanie RomanJD [facility]  [address] [place] [address] [facility] Jayy Mckeon  22.  [phone]  [address] [place] [address]

## 2020-03-06 NOTE — Clinical Note
3/18/20 Patient: Horace Diaz. YOB: 1950 Date of Visit: 3/6/2020 Valerie Holland MD 
[address] [place] [address] [id number] VIA In Basket Dear Valerie Holland MD, Thank you for referring Mr. Lola Dickerson to [address] [place] [address],[address] for evaluation. My notes for this consultation are attached. If you have questions, please do not hesitate to call me. I look forward to following your patient along with you. Sincerely, Beth Rao MD

## 2020-03-06 NOTE — PROGRESS NOTES
Chief Complaint   Patient presents with    New Patient     HCV     Visit Vitals  BP (!) 151/94 (BP 1 Location: Left arm, BP Patient Position: Sitting)   Pulse 87   Temp 97.2 °F (36.2 °C) (Tympanic)   Resp 17   Ht 5' 10\" (1.778 m)   Wt 168 lb (76.2 kg)   SpO2 98%   BMI 24.11 kg/m²     Patient states he is feeling nervous. Patient states it is usually 130/80. Will inform provider. 3 most recent PHQ Screens 8/14/2019   Little interest or pleasure in doing things Not at all   Feeling down, depressed, irritable, or hopeless Not at all   Total Score PHQ 2 0     Learning Assessment 3/6/2020   PRIMARY LEARNER Patient   HIGHEST LEVEL OF EDUCATION - PRIMARY LEARNER  SOME COLLEGE   BARRIERS PRIMARY LEARNER NONE   PRIMARY LANGUAGE ENGLISH   LEARNER PREFERENCE PRIMARY LISTENING   ANSWERED BY patent   RELATIONSHIP SELF     Abuse Screening Questionnaire 3/6/2020   Do you ever feel afraid of your partner? N   Are you in a relationship with someone who physically or mentally threatens you? N   Is it safe for you to go home?  Kika Rob

## 2020-03-07 LAB
ALBUMIN SERPL-MCNC: 4.5 G/DL (ref 3.8–4.8)
ALP SERPL-CCNC: 43 IU/L (ref 39–117)
ALT SERPL-CCNC: 34 IU/L (ref 0–44)
AST SERPL-CCNC: 49 IU/L (ref 0–40)
BASOPHILS # BLD AUTO: 0 X10E3/UL (ref 0–0.2)
BASOPHILS NFR BLD AUTO: 1 %
BILIRUB DIRECT SERPL-MCNC: 0.24 MG/DL (ref 0–0.4)
BILIRUB SERPL-MCNC: 0.6 MG/DL (ref 0–1.2)
BUN SERPL-MCNC: 12 MG/DL (ref 8–27)
BUN/CREAT SERPL: 11 (ref 10–24)
CALCIUM SERPL-MCNC: 9.7 MG/DL (ref 8.6–10.2)
CHLORIDE SERPL-SCNC: 101 MMOL/L (ref 96–106)
CO2 SERPL-SCNC: 26 MMOL/L (ref 20–29)
CREAT SERPL-MCNC: 1.06 MG/DL (ref 0.76–1.27)
EOSINOPHIL # BLD AUTO: 0.1 X10E3/UL (ref 0–0.4)
EOSINOPHIL NFR BLD AUTO: 2 %
ERYTHROCYTE [DISTWIDTH] IN BLOOD BY AUTOMATED COUNT: 12.2 % (ref 11.6–15.4)
GLUCOSE SERPL-MCNC: 95 MG/DL (ref 65–99)
HAV AB SER QL IA: POSITIVE
HBV CORE AB SERPL QL IA: POSITIVE
HBV SURFACE AB SER QL: NON REACTIVE
HBV SURFACE AG SERPL QL IA: NEGATIVE
HCT VFR BLD AUTO: 43.5 % (ref 37.5–51)
HGB BLD-MCNC: 14.5 G/DL (ref 13–17.7)
HIV 1+2 AB+HIV1 P24 AG SERPL QL IA: NON REACTIVE
IMM GRANULOCYTES # BLD AUTO: 0 X10E3/UL (ref 0–0.1)
IMM GRANULOCYTES NFR BLD AUTO: 0 %
LYMPHOCYTES # BLD AUTO: 1.4 X10E3/UL (ref 0.7–3.1)
LYMPHOCYTES NFR BLD AUTO: 39 %
MCH RBC QN AUTO: 33 PG (ref 26.6–33)
MCHC RBC AUTO-ENTMCNC: 33.3 G/DL (ref 31.5–35.7)
MCV RBC AUTO: 99 FL (ref 79–97)
MONOCYTES # BLD AUTO: 0.6 X10E3/UL (ref 0.1–0.9)
MONOCYTES NFR BLD AUTO: 16 %
NEUTROPHILS # BLD AUTO: 1.5 X10E3/UL (ref 1.4–7)
NEUTROPHILS NFR BLD AUTO: 42 %
PLATELET # BLD AUTO: 203 X10E3/UL (ref 150–450)
POTASSIUM SERPL-SCNC: 4.7 MMOL/L (ref 3.5–5.2)
PROT SERPL-MCNC: 7.5 G/DL (ref 6–8.5)
RBC # BLD AUTO: 4.4 X10E6/UL (ref 4.14–5.8)
SODIUM SERPL-SCNC: 143 MMOL/L (ref 134–144)
WBC # BLD AUTO: 3.5 X10E3/UL (ref 3.4–10.8)

## 2020-03-10 LAB
HCV RNA SERPL NAA+PROBE-ACNC: NORMAL IU/ML
HCV RNA SERPL NAA+PROBE-LOG IU: 5 LOG10 IU/ML
HCV RNA SERPL QL NAA+PROBE: POSITIVE
TEST INFORMATION: NORMAL

## 2020-03-16 ENCOUNTER — HOSPITAL ENCOUNTER (OUTPATIENT)
Dept: ULTRASOUND IMAGING | Age: 70
Discharge: HOME | End: 2020-03-16
Attending: INTERNAL MEDICINE
Payer: COMMERCIAL

## 2020-03-16 DIAGNOSIS — B18.2 CHRONIC HEPATITIS C WITHOUT HEPATIC COMA (HCC): ICD-10-CM

## 2020-03-16 PROCEDURE — 76700 US EXAM ABDOM COMPLETE: CPT

## 2020-03-17 DIAGNOSIS — I10 ESSENTIAL HYPERTENSION: ICD-10-CM

## 2020-03-17 RX ORDER — DILTIAZEM HYDROCHLORIDE 120 MG/1
CAPSULE, COATED, EXTENDED RELEASE ORAL
Qty: 90 CAP | Refills: 0 | Status: SHIPPED | OUTPATIENT
Start: 2020-03-17 | End: 2020-06-12

## 2020-07-01 ENCOUNTER — OFFICE VISIT (OUTPATIENT)
Dept: HEMATOLOGY | Age: 70
End: 2020-07-01

## 2020-07-01 VITALS
HEART RATE: 96 BPM | OXYGEN SATURATION: 97 % | TEMPERATURE: 98.6 F | WEIGHT: 166.6 LBS | HEIGHT: 70 IN | BODY MASS INDEX: 23.85 KG/M2 | DIASTOLIC BLOOD PRESSURE: 83 MMHG | SYSTOLIC BLOOD PRESSURE: 131 MMHG

## 2020-07-01 DIAGNOSIS — B19.20 HEPATITIS C VIRUS INFECTION WITHOUT HEPATIC COMA, UNSPECIFIED CHRONICITY: Primary | ICD-10-CM

## 2020-07-01 RX ORDER — GLECAPREVIR AND PIBRENTASVIR 40; 100 MG/1; MG/1
3 TABLET, FILM COATED ORAL DAILY
Qty: 84 TAB | Refills: 1 | Status: SHIPPED | OUTPATIENT
Start: 2020-07-01 | End: 2020-12-21 | Stop reason: ALTCHOICE

## 2020-07-01 NOTE — PROGRESS NOTES
[address] [place] [address], Milena KUNZ Winthrop Salles, MD Clent Ask, PA-C Morrell Gallop, ACNP-BC     Michelle Maxwell, [facility]   CHERELLE Elizondo-NAKIA Ruiz, [facility]       Betito Zeng [address] 136    at [address], [address] [place] [address], [facility] 22.    [phone]    FAX: [address] [place] [address]    at [address], [address]    [phone]    FAX: [phone]     Patient Care Team:  Clayton Santiago MD as PCP - General (Family Practice)  Clayton Santiago MD as PCP - [facility] Provider  Roxann Henao MD (Psychiatry)  Stan Dickey MD (Cardiology)  Jazmine Solis MD (Gastroenterology)    Problem List  Date Reviewed: 3/18/2020          Codes Class Noted    H/O gastric ulcer ICD-10-CM: Z87.19  ICD-9-CM: V12.79  2/9/2017        Vitamin D deficiency ICD-10-CM: E55.9  ICD-9-CM: 268.9  5/19/2015        Cervical pain (neck) ICD-10-CM: M54.2  ICD-9-CM: 723.1  3/13/2015        Encounter for monitoring Suboxone maintenance therapy ICD-10-CM: Z51.81, Z79.899  ICD-9-CM: V58.83, V58.69  8/3/2011        H/O partial resection of colon ICD-10-CM: Z90.49  ICD-9-CM: V45.72  8/27/2010    Overview Signed 3/6/2020  8:58 AM by Kiki Moreland MD     Due to diverticulitis and colon stricture             Hypertension ICD-10-CM: I10  ICD-9-CM: 401.9  8/27/2010        Hepatitis C ICD-10-CM: B19.20  ICD-9-CM: 070.70  8/27/2010    Overview Signed 6/8/2017  9:43 AM by Clayton Santiago MD     Was treated in 2000 per patient. Isaura Cannon is being seen at [address] for management of chronic HCV.  The active problem list, all pertinent past medical history, medications, radiologic findings and laboratory findings related to the liver disorder were reviewed with the patient. The patient is a 78 y.o. Black male who was found to have abnormalities in liver chemistries and subsequently tested positive for chronic HCV in 1990s. An ultrasound showed chronic liver disease. An assessment of liver fibrosis with elastography will be performed during this office visit. The patient was treated with peginterferon and ribavirin in early 2000s. The patient has the following symptom which is thought to be due to the liver disease: fatigue. The patient is not currently experiencing the following symptoms of liver disease: pain in the right side over the liver. The patient completes all daily activities without any functional limitations. ASSESSMENT AND PLAN:  Chronic HCV   Chronic HCV with no fibrosis. Liver transaminases are elevated. ALP is normal. Liver function is normal. The platelet count is normal.      Have performed laboratory testing to monitor liver function and degree of liver injury. This included BMP, hepatic panel and CBC with platelet count. Chronic HCV Treatment  The patient has previously been treated with peg interferon. The patient has HCV genotype 1A. The patient should be treated with Ester Curia as it is preferred. Screening for hepatocellular carcinoma  HCC screening is not necessary if the patient has no evidence of cirrhosis. Treatment of other medical problems in patients with chronic liver disease  There are no contraindications for the patient to take most medications necessary for treatment of other medical issues. Counseling for alcohol in patients with chronic liver disease  The patient was counseled regarding alcohol consumption and the effect of alcohol on chronic liver disease. The patient does not consume any significant amount of alcohol. Vaccinations   Vaccination for viral hepatitis A and B is not needed.  The patient has serologic evidence of prior exposure or vaccination with immunity. Routine vaccinations against other bacterial and viral agents can be performed as indicated. Annual flu vaccination should be administered if indicated. ALLERGIES  No Known Allergies    MEDICATIONS  Current Outpatient Medications   Medication Sig    dilTIAZem ER (CARDIZEM CD) 120 mg capsule TAKE 1 CAPSULE BY MOUTH EVERY DAY    fluticasone propionate (FLONASE) 50 mcg/actuation nasal spray SPRAY 2 SPRAYS INTO EACH NOSTRIL EVERY DAY AS NEEDED    buprenorphine-naloxone (SUBOXONE) 8-2 mg Subl 2 Tabs by SubLINGual route daily. (Patient taking differently: 1.5 Tabs by SubLINGual route daily.)    multivitamin (ONE A DAY) tablet Take 1 Tab by mouth daily. No current facility-administered medications for this visit. SYSTEM REVIEW NOT RELATED TO LIVER DISEASE OR REVIEWED ABOVE:  Constitution systems: Negative for fever, chills, weight gain, weight loss. Eyes: Negative for visual changes. ENT: Negative for sore throat, painful swallowing. Respiratory: Negative for cough, hemoptysis, SOB. Cardiology: Negative for chest pain, palpitations. GI:  Negative for constipation or diarrhea. : Negative for urinary frequency, dysuria, hematuria, nocturia. Skin: Negative for rash. Hematology: Negative for easy bruising, blood clots. Musculo-skelatal: Negative for back pain, muscle pain, weakness. Neurologic: Negative for headaches, dizziness, vertigo, memory problems not related to HE. Psychology: Negative for anxiety, depression. FAMILY HISTORY:  The father  of ESRD. The mother is alive at age 80 years. He has the following chronic disease(s): dementia. There is no family history of liver disease. SOCIAL HISTORY:  The patient is . The patient has 5 children and a lot of grandchildren. The patient stopped using tobacco products in .     The patient consumes 1 alcoholic beverages per day   The patient currently works full time for [employer] in [employer]. PHYSICAL EXAMINATION:  Visit Vitals  /83 (BP 1 Location: Right arm, BP Patient Position: Sitting)   Pulse 96   Temp 98.6 °F (37 °C) (Temporal)   Ht 5' 10\" (1.778 m)   Wt 166 lb 9.6 oz (75.6 kg)   SpO2 97%   BMI 23.90 kg/m²     General: No acute distress. Eyes: Sclera anicteric. ENT: No oral lesions. Nodes: No adenopathy. Skin: No spider angiomata. No jaundice. No palmar erythema. Respiratory: Lungs clear to auscultation. Cardiovascular: Regular heart rate. No murmurs. No JVD. Abdomen: Soft non-tender. Liver size normal to percussion/palpation. Spleen not palpable. No obvious ascites. Extremities: No edema. No muscle wasting. No gross arthritic changes. Neurologic: Alert and oriented. Cranial nerves grossly intact. No asterixis. LABORATORY STUDIES:  [facility] [place] [address] Units 3/6/2020   WBC 3.4 - 10.8 x10E3/uL 3.5   ANC 1.4 - 7.0 x10E3/uL 1.5   HGB 13.0 - 17.7 g/dL 14.5    - 450 x10E3/uL 203   INR 0.9 - 1.1      AST 0 - 40 IU/L 49 (H)   ALT 0 - 44 IU/L 34   Alk Phos 39 - 117 IU/L 43   Bili, Total 0.0 - 1.2 mg/dL 0.6   Bili, Direct 0.00 - 0.40 mg/dL 0.24   Albumin 3.8 - 4.8 g/dL 4.5   BUN 8 - 27 mg/dL 12   Creat 0.76 - 1.27 mg/dL 1.06   Na 134 - 144 mmol/L 143   K 3.5 - 5.2 mmol/L 4.7   Cl 96 - 106 mmol/L 101   CO2 20 - 29 mmol/L 26   Glucose 65 - 99 mg/dL 95     SEROLOGIES:  Serologies Latest Ref Rng & Units 3/6/2020 8/14/2019   Hep A Ab, Total Negative Positive (A)    Hep B Surface Ag Negative Negative    Hep B Core Ab, Total Negative Positive (A)    Hep B Surface AB QL  Non Reactive    Hep C Genotype   1a   HCV RT-PCR, Quant IU/mL 99,700 335,000   HCV Log10 log10 IU/mL  5.525     LIVER HISTOLOGY:  7/2020. [facility]. EkPa was 4.4. IQR/med 5%. . The results suggested a fibrosis level of F0.  The CAP score suggests no evidence of fatty liver.    ENDOSCOPIC PROCEDURES:  Not available or performed    RADIOLOGY:  3/2020. Ultrasound of liver. Echogenic consistent with fatty liver. No liver mass lesions. No dilated bile ducts. No ascites. OTHER TESTING:  Not available or performed    FOLLOW-UP:  All of the issues listed above in the assessment and plan were discussed with the patient. All questions were answered. The patient expressed a clear understanding of the above. [facility] 87 4 weeks after initiation of medical therapy. The patient was advised to call the office when he receives his medication to provide us with his start date and to schedule his 4 week treatment follow up appointment.      Kika Watters, BRAIN-BC  [facility] [place] [address], [address] Jayy Siegel  22.  [phone]

## 2020-07-01 NOTE — PROGRESS NOTES
Chief Complaint   Patient presents with    Follow-up     fibroscan     Visit Vitals  /83 (BP 1 Location: Right arm, BP Patient Position: Sitting)   Pulse 96   Temp 98.6 °F (37 °C) (Temporal)   Ht 5' 10\" (1.778 m)   Wt 166 lb 9.6 oz (75.6 kg)   SpO2 97%   BMI 23.90 kg/m²     3 most recent PHQ Screens 8/14/2019   Little interest or pleasure in doing things Not at all   Feeling down, depressed, irritable, or hopeless Not at all   Total Score PHQ 2 0     Abuse Screening Questionnaire 3/6/2020   Do you ever feel afraid of your partner? N   Are you in a relationship with someone who physically or mentally threatens you? N   Is it safe for you to go home? Y     Learning Assessment 3/6/2020   PRIMARY LEARNER Patient   HIGHEST LEVEL OF EDUCATION - PRIMARY LEARNER  SOME COLLEGE   BARRIERS PRIMARY LEARNER NONE   PRIMARY LANGUAGE ENGLISH   LEARNER PREFERENCE PRIMARY LISTENING   ANSWERED BY patent   RELATIONSHIP SELF     1. Have you been to the ER, urgent care clinic since your last visit? Hospitalized since your last visit? No    2. Have you seen or consulted any other health care providers outside of the [address] [place] [address] since your last visit? Include any pap smears or colon screening.  No

## 2020-07-01 NOTE — PROGRESS NOTES
Chief Complaint   Patient presents with    Follow-up     fibroscan     Visit Vitals  /83 (BP 1 Location: Right arm, BP Patient Position: Sitting)   Pulse 96   Temp 98.6 °F (37 °C) (Temporal)   Ht 5' 10\" (1.778 m)   Wt 166 lb 9.6 oz (75.6 kg)   SpO2 97%   BMI 23.90 kg/m²     3 most recent PHQ Screens 8/14/2019   Little interest or pleasure in doing things Not at all   Feeling down, depressed, irritable, or hopeless Not at all   Total Score PHQ 2 0     Abuse Screening Questionnaire 3/6/2020   Do you ever feel afraid of your partner? N   Are you in a relationship with someone who physically or mentally threatens you? N   Is it safe for you to go home? Y     Learning Assessment 3/6/2020   PRIMARY LEARNER Patient   HIGHEST LEVEL OF EDUCATION - PRIMARY LEARNER  SOME COLLEGE   BARRIERS PRIMARY LEARNER NONE   PRIMARY LANGUAGE ENGLISH   LEARNER PREFERENCE PRIMARY LISTENING   ANSWERED BY patent   RELATIONSHIP SELF     1. Have you been to the ER, urgent care clinic since your last visit? Hospitalized since your last visit? No    2. Have you seen or consulted any other health care providers outside of the [address] [place] [address] since your last visit? Include any pap smears or colon screening.  No

## 2020-07-29 ENCOUNTER — OFFICE VISIT (OUTPATIENT)
Dept: PRIMARY CARE CLINIC | Age: 70
End: 2020-07-29

## 2020-07-29 ENCOUNTER — HOSPITAL ENCOUNTER (OUTPATIENT)
Dept: GENERAL RADIOLOGY | Age: 70
Discharge: HOME | End: 2020-07-29
Payer: COMMERCIAL

## 2020-07-29 VITALS
WEIGHT: 167.4 LBS | HEIGHT: 70 IN | SYSTOLIC BLOOD PRESSURE: 125 MMHG | DIASTOLIC BLOOD PRESSURE: 85 MMHG | RESPIRATION RATE: 17 BRPM | OXYGEN SATURATION: 98 % | HEART RATE: 84 BPM | BODY MASS INDEX: 23.96 KG/M2 | TEMPERATURE: 97 F

## 2020-07-29 DIAGNOSIS — M54.50 ACUTE LEFT-SIDED LOW BACK PAIN WITHOUT SCIATICA: Primary | ICD-10-CM

## 2020-07-29 DIAGNOSIS — M54.50 ACUTE LEFT-SIDED LOW BACK PAIN WITHOUT SCIATICA: ICD-10-CM

## 2020-07-29 DIAGNOSIS — I10 ESSENTIAL HYPERTENSION: ICD-10-CM

## 2020-07-29 PROCEDURE — 72100 X-RAY EXAM L-S SPINE 2/3 VWS: CPT

## 2020-07-29 RX ORDER — METHYLPREDNISOLONE 4 MG/1
TABLET ORAL
Qty: 1 DOSE PACK | Refills: 0 | Status: SHIPPED | OUTPATIENT
Start: 2020-07-29 | End: 2020-08-25 | Stop reason: ALTCHOICE

## 2020-07-29 RX ORDER — CYCLOBENZAPRINE HCL 10 MG
10 TABLET ORAL
Qty: 30 TAB | Refills: 0 | Status: SHIPPED | OUTPATIENT
Start: 2020-07-29 | End: 2021-05-06

## 2020-07-29 NOTE — PROGRESS NOTES
Isaura Rock. is a 78 y.o. male    Chief Complaint   Patient presents with    Hip Pain     patient states x1 week left hip pain 6/10 on pain scale, he was lifting boxes over the weekend that were 40-50lbs but no known injury to it.  Blood Pressure Check     per patient BP has been elevated recently. 1. Have you been to the ER, urgent care clinic since your last visit? Hospitalized since your last visit? No    2. Have you seen or consulted any other health care providers outside of the [address] [place] [address] since your last visit? Include any pap smears or colon screening. No    No flowsheet data found.      Health Maintenance Due   Topic Date Due    Shingrix Vaccine Age 49> (1 of 2) 01/31/2002    GLAUCOMA SCREENING Q2Y  01/31/2017

## 2020-07-29 NOTE — PROGRESS NOTES
I attempted to call twice. Voice mail has not set up yet. Called Home phone # and someone picked up the phone. I asked to give us a call about the result. Please call and let him know: Xray showed arthritis at L4-5, L5-S1.  otherwise no fracture or any other abnormality. Continue med as advised and follow up if not better. at least 3/5 throughout except Right UE at least 2-/5 ; Left shoulder flexion active ROM to 80 degrees

## 2020-07-29 NOTE — PROGRESS NOTES
Subjective:     Chief Complaint   Patient presents with    Hip Pain     patient states x1 week left hip pain 6/10 on pain scale, he was lifting boxes over the weekend that were 40-50lbs but no known injury to it.  Blood Pressure Check     per patient BP has been elevated recently. He  is a 78y.o. year old male who presents today with a c/o left hip pain and blood pressure follow up    Pain in his left side of the hip for the past 4 days. Lifted heavy boxes at work on Friday and after 24 hours he started having this pain. Getting up from bed, sitting for extended period of time worsen the pain. Pain describes as burning sensation. No radiation. Denies any numbness or tingling. Did no take any med for the pain. BP has been up lately when he checks at home. He is compliant with Diltiazem. Has been following up with hepatologist for hep C Rx. Pertinent items are noted in HPI. Objective:     Vitals:    07/29/20 1213 07/29/20 1229   BP: (!) 147/93 125/85   Pulse: 84    Resp: 17    Temp: 97 °F (36.1 °C)    TempSrc: Temporal    SpO2: 98%    Weight: 167 lb 6.4 oz (75.9 kg)    Height: 5' 10\" (1.778 m)        Physical Examination: General appearance - alert, well appearing, and in no distress, oriented to person, place, and time and normal appearing weight  Mental status - alert, oriented to person, place, and time, normal mood, behavior, speech, dress, motor activity, and thought processes  Back exam - TTP over left lumbar paraspinal muscle. Pain with motion noted during exam, sacroiliac joints and sciatic notches non tender. External rotation, flexion of left hip was painful.  normal reflexes and strength bilateral lower extremities  Neurological - alert, oriented, normal speech, no focal findings or movement disorder noted    Extremities - no pedal edema noted    No Known Allergies   Social History     Socioeconomic History    Marital status: SINGLE     Spouse name: Not on file    Number of children: Not on file    Years of education: Not on file    Highest education level: Not on file   Tobacco Use    Smoking status: Former Smoker    Smokeless tobacco: Never Used    Tobacco comment: quit May 2010   Substance and Sexual Activity    Alcohol use: Yes     Alcohol/week: 0.8 standard drinks     Types: 1 Cans of beer per week     Comment: occasional beer.  Drug use: No     Types: Heroin     Comment: quit 2007    Sexual activity: Not Currently      Family History   Problem Relation Age of Onset    Heart Disease Mother     Diabetes Father     Diabetes Sister     Diabetes Paternal Grandmother       Past Surgical History:   Procedure Laterality Date    HX APPENDECTOMY      HX GI      surgical repair of bleeding ulcer    HX GI      colon resection    HX HEENT      T&A    HX KNEE ARTHROSCOPY  1996    right knee    HX ORTHOPAEDIC  2003 & 2005    left knee      Past Medical History:   Diagnosis Date    Hepatitis C     Hepatitis C 8/27/2010    Hypertension     PUD (peptic ulcer disease)     Seizures ([facility] [address]     as a child-none since ~1976      Current Outpatient Medications   Medication Sig Dispense Refill    glecaprevir-pibrentasvir (Mavyret) 100-40 mg tab Take 3 Tabs by mouth daily. Indications: chronic infection of genotype 1 hepatitis C virus 84 Tab 1    dilTIAZem ER (CARDIZEM CD) 120 mg capsule TAKE 1 CAPSULE BY MOUTH EVERY DAY 90 Cap 0    fluticasone propionate (FLONASE) 50 mcg/actuation nasal spray SPRAY 2 SPRAYS INTO EACH NOSTRIL EVERY DAY AS NEEDED 1 Bottle 2    buprenorphine-naloxone (SUBOXONE) 8-2 mg Subl 2 Tabs by SubLINGual route daily. (Patient taking differently: 1.5 Tabs by SubLINGual route daily.) 60 Tab 2    multivitamin (ONE A DAY) tablet Take 1 Tab by mouth daily. Assessment/ Plan:   Diagnoses and all orders for this visit:    1. Acute left-sided low back pain without sciatica  -     cyclobenzaprine (FLEXERIL) 10 mg tablet;  Take 1 Tab by mouth three (3) times daily as needed for Muscle Spasm(s). -     methylPREDNISolone (MEDROL DOSEPACK) 4 mg tablet; Follow pack instruction. -     XR SPINE LUMB 2 OR 3 V; Future:  Xray showed arthritis at L4-5, L5-S1.  otherwise no fracture or any other abnormality. Continue med as advised and follow up if not better. 2. Essential hypertension     BP is well controlled with current med. Medication risks/benefits/costs/interactions/alternatives discussed with patient. Advised patient to call back or return to office if symptoms worsen/change/persist. If patient cannot reach us or should anything more severe/urgent arise he/she should proceed directly to the nearest emergency department. Discussed expected course/resolution/complications of diagnosis in detail with patient. Patient given a written after visit summary which includes her diagnoses, current medications and vitals. Patient expressed understanding with the diagnosis and plan. Follow-up and Dispositions    · Return in about 1 month (around 8/29/2020), or if symptoms worsen or fail to improve, for complete physical  and fasting blood work., Bring BP log book. Maurizio Cabrera

## 2020-07-30 ENCOUNTER — TELEPHONE (OUTPATIENT)
Dept: PRIMARY CARE CLINIC | Age: 70
End: 2020-07-30

## 2020-07-30 NOTE — TELEPHONE ENCOUNTER
----- Message from Judd Gurrola MD sent at 7/29/2020  5:08 PM EDT -----  I attempted to call twice. Voice mail has not set up yet. Called Home phone # and someone picked up the phone. I asked to give us a call about the result. Please call and let him know: Xray showed arthritis at L4-5, L5-S1.  otherwise no fracture or any other abnormality. Continue med as advised and follow up if not better.

## 2020-08-17 ENCOUNTER — OFFICE VISIT (OUTPATIENT)
Dept: HEMATOLOGY | Age: 70
End: 2020-08-17
Payer: COMMERCIAL

## 2020-08-17 VITALS
RESPIRATION RATE: 16 BRPM | HEART RATE: 83 BPM | OXYGEN SATURATION: 96 % | SYSTOLIC BLOOD PRESSURE: 113 MMHG | WEIGHT: 168.2 LBS | HEIGHT: 70 IN | BODY MASS INDEX: 24.08 KG/M2 | TEMPERATURE: 98.2 F | DIASTOLIC BLOOD PRESSURE: 76 MMHG

## 2020-08-17 DIAGNOSIS — B19.20 HEPATITIS C VIRUS INFECTION WITHOUT HEPATIC COMA, UNSPECIFIED CHRONICITY: Primary | ICD-10-CM

## 2020-08-17 PROCEDURE — 99213 OFFICE O/P EST LOW 20 MIN: CPT | Performed by: NURSE PRACTITIONER

## 2020-08-17 NOTE — PROGRESS NOTES
[address] [place] [address], Murray KUNZ Abran Morel, MD Lillia Ellison, JAZMINE Fisher, [facility]     Michelle Maxwell, [employer]   CHERELLE Banuelos-NAKIA Hernandez [facility]       Betito Sanchez [facility] 136    at [facility]    [facility], [address] [place] [address], [facility] 22.    [phone]    FAX: [address] [place] [address]    at [address], [address]    [phone]    FAX: [phone]     Patient Care Team:  David Arnold MD as PCP - General (Family Medicine)  David Arnold MD as PCP - [facility] [place] [facility]  Jesse Mckinley MD (Psychiatry)  Margie Nicholas MD (Cardiology)  Andreia Cheung MD (Gastroenterology)    Problem List  Date Reviewed: 7/29/2020          Codes Class Noted    H/O gastric ulcer ICD-10-CM: Z87.19  ICD-9-CM: V12.79  2/9/2017        Vitamin D deficiency ICD-10-CM: E55.9  ICD-9-CM: 268.9  5/19/2015        Cervical pain (neck) ICD-10-CM: M54.2  ICD-9-CM: 723.1  3/13/2015        Encounter for monitoring Suboxone maintenance therapy ICD-10-CM: Z51.81, Z79.899  ICD-9-CM: V58.83, V58.69  8/3/2011        H/O partial resection of colon ICD-10-CM: Z90.49  ICD-9-CM: V45.72  8/27/2010    Overview Signed 3/6/2020  8:58 AM by Tariq Larose MD     Due to diverticulitis and colon stricture             Hypertension ICD-10-CM: I10  ICD-9-CM: 401.9  8/27/2010        Hepatitis C ICD-10-CM: B19.20  ICD-9-CM: 070.70  8/27/2010    Overview Signed 6/8/2017  9:43 AM by David Arnold MD     Was treated in 2000 per patient. Fifi Chambers. [facility] of chronic HCV.  The active problem list, all pertinent past medical history, medications, radiologic findings and laboratory findings related to the liver disorder were reviewed with the patient. The patient is a 78 y.o. Black male who was found to have abnormalities in liver chemistries and subsequently tested positive for chronic HCV in 1990s. An ultrasound showed chronic liver disease. An assessment of liver fibrosis with elastography suggested no fibrosis. The patient was treated with peginterferon and ribavirin in early 2000s. He is currently on 8 weeks of [address]. He is tolerating this without issue and has missed no doses. He has had headaches infrequently. The patient has the following symptom which is thought to be due to the liver disease: fatigue. The patient is not currently experiencing the following symptoms of liver disease: pain in the right side over the liver. The patient completes all daily activities without any functional limitations. ASSESSMENT AND PLAN:  Chronic HCV   Chronic HCV with no fibrosis. Liver transaminases are elevated. ALP is normal. Liver function is normal. The platelet count is normal.    Have performed laboratory testing to monitor liver function and degree of liver injury. This included BMP, hepatic panel and CBC with platelet count. Chronic HCV treatment  The patient has previously been treated with peg interferon. He is now currently on 8 weeks of [address]. I will check his response to medication today. The patient has HCV genotype 1A. Screening for hepatocellular carcinoma  HCC screening is not necessary if the patient has no evidence of cirrhosis. Treatment of other medical problems in patients with chronic liver disease  There are no contraindications for the patient to take most medications necessary for treatment of other medical issues. Counseling for alcohol in patients with chronic liver disease  The patient was counseled regarding alcohol consumption and the effect of alcohol on chronic liver disease.  The patient does not consume any significant amount of alcohol. Vaccinations   Vaccination for viral hepatitis A and B is not needed. The patient has serologic evidence of prior exposure or vaccination with immunity. Routine vaccinations against other bacterial and viral agents can be performed as indicated. Annual flu vaccination should be administered if indicated. ALLERGIES  No Known Allergies    MEDICATIONS  Current Outpatient Medications   Medication Sig    cyclobenzaprine (FLEXERIL) 10 mg tablet Take 1 Tab by mouth three (3) times daily as needed for Muscle Spasm(s).  methylPREDNISolone (MEDROL DOSEPACK) 4 mg tablet Follow pack instruction.  glecaprevir-pibrentasvir (Mavyret) 100-40 mg tab Take 3 Tabs by mouth daily. Indications: chronic infection of genotype 1 hepatitis C virus    dilTIAZem ER (CARDIZEM CD) 120 mg capsule TAKE 1 CAPSULE BY MOUTH EVERY DAY    fluticasone propionate (FLONASE) 50 mcg/actuation nasal spray SPRAY 2 SPRAYS INTO EACH NOSTRIL EVERY DAY AS NEEDED    buprenorphine-naloxone (SUBOXONE) 8-2 mg Subl 2 Tabs by SubLINGual route daily. (Patient taking differently: 1.5 Tabs by SubLINGual route daily.)    multivitamin (ONE A DAY) tablet Take 1 Tab by mouth daily. No current facility-administered medications for this visit. SYSTEM REVIEW NOT RELATED TO LIVER DISEASE OR REVIEWED ABOVE:  Constitution systems: Negative for fever, chills, weight gain, weight loss. Eyes: Negative for visual changes. ENT: Negative for sore throat, painful swallowing. Respiratory: Negative for cough, hemoptysis, SOB. Cardiology: Negative for chest pain, palpitations. GI:  Negative for constipation or diarrhea. : Negative for urinary frequency, dysuria, hematuria, nocturia. Skin: Negative for rash. Hematology: Negative for easy bruising, blood clots. Musculo-skeletal: Negative for back pain, muscle pain, weakness.   Neurologic: Negative for headaches, dizziness, vertigo, memory problems not related to HE.  Psychology: Negative for anxiety, depression. FAMILY HISTORY:  The father  of ESRD. The mother is alive at age 80 years. He has the following chronic disease(s): dementia. There is no family history of liver disease. SOCIAL HISTORY:  The patient is . The patient has 5 children and a lot of grandchildren. The patient stopped using tobacco products in . The patient consumes 1 alcoholic beverages per day   The patient currently works full time for [employer] in [employer]. PHYSICAL EXAMINATION:  Visit Vitals  /76 (BP 1 Location: Left arm, BP Patient Position: Sitting)   Pulse 83   Temp 98.2 °F (36.8 °C) (Oral)   Resp 16   Ht 5' 10\" (1.778 m)   Wt 168 lb 3.2 oz (76.3 kg)   SpO2 96%   BMI 24.13 kg/m²     General: No acute distress. Eyes: Sclera anicteric. ENT: No oral lesions. Nodes: No adenopathy. Skin: No spider angiomata. No jaundice. No palmar erythema. Respiratory: Lungs clear to auscultation. Cardiovascular: Regular heart rate. No murmurs. No JVD. Abdomen: Soft non-tender. Liver size normal to percussion/palpation. Spleen not palpable. No obvious ascites. Extremities: No edema. No muscle wasting. No gross arthritic changes. Neurologic: Alert and oriented. Cranial nerves grossly intact. No asterixis.     LABORATORY STUDIES:  Liver [place] of [address] Units 3/6/2020 2019 2018   WBC 3.4 - 10.8 x10E3/uL 3.5 3.1 (L) 4.0   ANC 1.4 - 7.0 x10E3/uL 1.5 1.2 (L) 1.6   HGB 13.0 - 17.7 g/dL 14.5 13.9 14.1    - 450 x10E3/uL 203 191 194   INR 0.9 - 1.1        AST 0 - 40 IU/L 49 (H) 74 (H) 112 (H)   ALT 0 - 44 IU/L 34 50 (H) 72 (H)   Alk Phos 39 - 117 IU/L 43 42 38 (L)   Bili, Total 0.0 - 1.2 mg/dL 0.6 0.9 1.1   Bili, Direct 0.00 - 0.40 mg/dL 0.24     Albumin 3.8 - 4.8 g/dL 4.5 4.0 4.4   BUN 8 - 27 mg/dL 12 13 17   Creat 0.76 - 1.27 mg/dL 1.06 0.86 1.21   Na 134 - 144 mmol/L 143 142 141   K 3.5 - 5.2 mmol/L 4.7 4.9 4.5 Cl 96 - 106 mmol/L 101 101 101   CO2 20 - 29 mmol/L 26 26 24   Glucose 65 - 99 mg/dL 95 92 92     Liver [place] [facility] Latest Ref Rng & Units 6/8/2017   WBC 3.4 - 10.8 x10E3/uL 3.8   ANC 1.4 - 7.0 x10E3/uL 1.4   HGB 13.0 - 17.7 g/dL 14.1    - 450 x10E3/uL 205   INR 0.9 - 1.1      AST 0 - 40 IU/L 70 (H)   ALT 0 - 44 IU/L 52 (H)   Alk Phos 39 - 117 IU/L 53   Bili, Total 0.0 - 1.2 mg/dL 0.6   Bili, Direct 0.00 - 0.40 mg/dL    Albumin 3.8 - 4.8 g/dL 4.7   BUN 8 - 27 mg/dL 12   Creat 0.76 - 1.27 mg/dL 0.83   Na 134 - 144 mmol/L 142   K 3.5 - 5.2 mmol/L 4.1   Cl 96 - 106 mmol/L 98   CO2 20 - 29 mmol/L 27   Glucose 65 - 99 mg/dL 81     SEROLOGIES:  Serologies Latest Ref Rng & Units 3/6/2020 8/14/2019   Hep A Ab, Total Negative Positive (A)    Hep B Surface Ag Negative Negative    Hep B Core Ab, Total Negative Positive (A)    Hep B Surface AB QL  Non Reactive    Hep C Genotype   1a   HCV RT-PCR, Quant IU/mL 99,700 335,000   HCV Log10 log10 IU/mL  5.525     LIVER HISTOLOGY:  7/2020. FibroScan performed at [address]. EkPa was 4.4. IQR/med 5%. . The results suggested a fibrosis level of F0. The CAP score suggests no evidence of fatty liver. ENDOSCOPIC PROCEDURES:  Not available or performed    RADIOLOGY:  3/2020. Ultrasound of liver. Echogenic consistent with fatty liver. No liver mass lesions. No dilated bile ducts. No ascites. OTHER TESTING:  Not available or performed    FOLLOW-UP:  All of the issues listed above in the assessment and plan were discussed with the patient. All questions were answered. The patient expressed a clear understanding of the above. Return to [place] [address] in 4 months for SVR check.     Esther Olea [facility]  Liver [place] [facility] [address] [facility], [address] Jayy Siegel  22.  [phone]

## 2020-08-17 NOTE — PROGRESS NOTES
Chief Complaint   Patient presents with    Hepatitis C     3 most recent PHQ Screens 8/17/2020   Little interest or pleasure in doing things Not at all   Feeling down, depressed, irritable, or hopeless Not at all   Total Score PHQ 2 0     Abuse Screening Questionnaire 8/17/2020   Do you ever feel afraid of your partner? N   Are you in a relationship with someone who physically or mentally threatens you? N   Is it safe for you to go home? Y     Visit Vitals  /76 (BP 1 Location: Left arm, BP Patient Position: Sitting)   Pulse 83   Temp 98.2 °F (36.8 °C) (Oral)   Resp 16   Ht 5' 10\" (1.778 m)   Wt 168 lb 3.2 oz (76.3 kg)   SpO2 96%   BMI 24.13 kg/m²     1. Have you been to the ER, urgent care clinic since your last visit? Hospitalized since your last visit?no    2. Have you seen or consulted any other health care providers outside of the [address] [place] [address] since your last visit? Include any pap smears or colon screening.  no

## 2020-08-19 LAB
ERYTHROCYTE [DISTWIDTH] IN BLOOD BY AUTOMATED COUNT: 12 % (ref 11.6–15.4)
HCT VFR BLD AUTO: 39.2 % (ref 37.5–51)
HGB BLD-MCNC: 13.9 G/DL (ref 13–17.7)
MCH RBC QN AUTO: 33.6 PG (ref 26.6–33)
MCHC RBC AUTO-ENTMCNC: 35.5 G/DL (ref 31.5–35.7)
MCV RBC AUTO: 95 FL (ref 79–97)
PLATELET # BLD AUTO: 179 X10E3/UL (ref 150–450)
RBC # BLD AUTO: 4.14 X10E6/UL (ref 4.14–5.8)
WBC # BLD AUTO: 5.9 X10E3/UL (ref 3.4–10.8)

## 2020-08-20 LAB
BUN SERPL-MCNC: 13 MG/DL (ref 8–27)
BUN/CREAT SERPL: 12 (ref 10–24)
CALCIUM SERPL-MCNC: 9.4 MG/DL (ref 8.6–10.2)
CHLORIDE SERPL-SCNC: 100 MMOL/L (ref 96–106)
CO2 SERPL-SCNC: 24 MMOL/L (ref 20–29)
CREAT SERPL-MCNC: 1.08 MG/DL (ref 0.76–1.27)
GLUCOSE SERPL-MCNC: 72 MG/DL (ref 65–99)
HCV RNA SERPL QL NAA+PROBE: NEGATIVE
POTASSIUM SERPL-SCNC: 4.6 MMOL/L (ref 3.5–5.2)
SODIUM SERPL-SCNC: 141 MMOL/L (ref 134–144)

## 2020-08-21 LAB
ALBUMIN SERPL-MCNC: 4.3 G/DL (ref 3.8–4.8)
ALP SERPL-CCNC: 57 IU/L (ref 39–117)
ALT SERPL-CCNC: 12 IU/L (ref 0–44)
AST SERPL-CCNC: 22 IU/L (ref 0–40)
BILIRUB DIRECT SERPL-MCNC: 0.26 MG/DL (ref 0–0.4)
BILIRUB SERPL-MCNC: 0.6 MG/DL (ref 0–1.2)
PROT SERPL-MCNC: 7 G/DL (ref 6–8.5)

## 2020-08-25 ENCOUNTER — OFFICE VISIT (OUTPATIENT)
Dept: PRIMARY CARE CLINIC | Age: 70
End: 2020-08-25
Payer: COMMERCIAL

## 2020-08-25 VITALS
DIASTOLIC BLOOD PRESSURE: 73 MMHG | HEART RATE: 73 BPM | HEIGHT: 70 IN | TEMPERATURE: 97.8 F | BODY MASS INDEX: 24.2 KG/M2 | RESPIRATION RATE: 17 BRPM | SYSTOLIC BLOOD PRESSURE: 117 MMHG | WEIGHT: 169 LBS | OXYGEN SATURATION: 98 %

## 2020-08-25 DIAGNOSIS — Z00.00 WELL ADULT ON ROUTINE HEALTH CHECK: ICD-10-CM

## 2020-08-25 DIAGNOSIS — I10 ESSENTIAL HYPERTENSION: ICD-10-CM

## 2020-08-25 DIAGNOSIS — B19.20 HEPATITIS C VIRUS INFECTION WITHOUT HEPATIC COMA, UNSPECIFIED CHRONICITY: ICD-10-CM

## 2020-08-25 DIAGNOSIS — Z00.00 WELL ADULT ON ROUTINE HEALTH CHECK: Primary | ICD-10-CM

## 2020-08-25 PROCEDURE — 99397 PER PM REEVAL EST PAT 65+ YR: CPT | Performed by: FAMILY MEDICINE

## 2020-08-25 RX ORDER — DILTIAZEM HYDROCHLORIDE 120 MG/1
CAPSULE, COATED, EXTENDED RELEASE ORAL
Qty: 90 CAP | Refills: 1 | Status: SHIPPED | OUTPATIENT
Start: 2020-08-25 | End: 2021-03-08

## 2020-08-25 NOTE — PROGRESS NOTES
Gavi Isabella. is a 78 y.o. male    Chief Complaint   Patient presents with    Complete Physical     Had coffee with creamer and sugar    Blood Pressure Check       1. Have you been to the ER, urgent care clinic since your last visit? Hospitalized since your last visit? No    2. Have you seen or consulted any other health care providers outside of the [address] [place] [address] since your last visit? Include any pap smears or colon screening. No    No flowsheet data found.      Health Maintenance Due   Topic Date Due    Shingrix Vaccine Age 49> (1 of 2) 01/31/2002    GLAUCOMA SCREENING Q2Y  01/31/2017

## 2020-08-25 NOTE — PROGRESS NOTES
Subjective:   Guy Mesa is a 78 y.o. male presenting for  his annual checkup as well as blood pressure follow up.      HTN: well controlled with Diltiazem 120 mg. He is compliant with med.      He has been following up with hepatologist for  Hep C Rx .       Has been following up with psychiatrist for Suboxone Rx.    ROS:  Feeling well. No dyspnea or chest pain on exertion. No abdominal pain, change in bowel habits, black or bloody stools. No urinary tract or prostatic symptoms. No neurological complaints. Specific concerns today: none. Patient Active Problem List   Diagnosis Code    H/O partial resection of colon Z90.49    Hypertension I10    Hepatitis C B19.20    Encounter for monitoring Suboxone maintenance therapy Z51.81, Z79.899    Cervical pain (neck) M54.2    Vitamin D deficiency E55.9    H/O gastric ulcer Z87.19     Current Outpatient Medications   Medication Sig Dispense Refill    dilTIAZem ER (CARDIZEM CD) 120 mg capsule TAKE 1 CAPSULE BY MOUTH EVERY DAY 90 Cap 1    cyclobenzaprine (FLEXERIL) 10 mg tablet Take 1 Tab by mouth three (3) times daily as needed for Muscle Spasm(s). 30 Tab 0    glecaprevir-pibrentasvir (Mavyret) 100-40 mg tab Take 3 Tabs by mouth daily. Indications: chronic infection of genotype 1 hepatitis C virus 84 Tab 1    fluticasone propionate (FLONASE) 50 mcg/actuation nasal spray SPRAY 2 SPRAYS INTO EACH NOSTRIL EVERY DAY AS NEEDED 1 Bottle 2    buprenorphine-naloxone (SUBOXONE) 8-2 mg Subl 2 Tabs by SubLINGual route daily. (Patient taking differently: 1.5 Tabs by SubLINGual route daily.) 60 Tab 2    multivitamin (ONE A DAY) tablet Take 1 Tab by mouth daily.        No Known Allergies  Past Medical History:   Diagnosis Date    Hepatitis C     Hepatitis C 8/27/2010    Hypertension     PUD (peptic ulcer disease)     Seizures ([facility] [address]     as a child-none since ~1976        Lab Results   Component Value Date/Time    WBC 5.9 08/17/2020 12:00 AM    HGB 13.9 08/17/2020 12:00 AM    HCT 39.2 08/17/2020 12:00 AM    PLATELET 542 41/53/1709 12:00 AM    MCV 95 08/17/2020 12:00 AM     Lab Results   Component Value Date/Time    Cholesterol, total 223 (H) 08/14/2019 12:44 PM    HDL Cholesterol 136 08/14/2019 12:44 PM    LDL, calculated 77 08/14/2019 12:44 PM    Triglyceride 50 08/14/2019 12:44 PM     Lab Results   Component Value Date/Time    ALT (SGPT) 12 08/17/2020 12:00 AM    Alk. phosphatase 57 08/17/2020 12:00 AM    Bilirubin, direct 0.26 08/17/2020 12:00 AM    Bilirubin, total 0.6 08/17/2020 12:00 AM    Albumin 4.3 08/17/2020 12:00 AM    Protein, total 7.0 08/17/2020 12:00 AM    INR 1.1 02/08/2017 05:34 AM    Prothrombin time 11.0 02/08/2017 05:34 AM    PLATELET 386 40/72/7669 12:00 AM     Lab Results   Component Value Date/Time    GFR est non-AA 70 08/17/2020 12:00 AM    GFRNA, POC >60 07/08/2010 06:57 PM    GFR est AA 81 08/17/2020 12:00 AM    GFRAA, POC >60 07/08/2010 06:57 PM    Creatinine 1.08 08/17/2020 12:00 AM    Creatinine (POC) 0.9 07/08/2010 06:57 PM    BUN 13 08/17/2020 12:00 AM    Sodium 141 08/17/2020 12:00 AM    Potassium 4.6 08/17/2020 12:00 AM    Chloride 100 08/17/2020 12:00 AM    CO2 24 08/17/2020 12:00 AM     Lab Results   Component Value Date/Time    Prostate Specific Ag 0.2 08/14/2019 12:44 PM    Prostate Specific Ag 0.2 06/19/2018 09:37 AM    Prostate Specific Ag 0.2 06/08/2017 09:25 AM     Lab Results   Component Value Date/Time    TSH 1.430 06/19/2018 09:37 AM    T4, Free 1.25 06/19/2018 09:37 AM      Lab Results   Component Value Date/Time    Hemoglobin A1c 5.4 08/14/2019 12:44 PM         Objective:     Visit Vitals  /73 (BP 1 Location: Left arm, BP Patient Position: Sitting)   Pulse 73   Temp 97.8 °F (36.6 °C) (Temporal)   Resp 17   Ht 5' 10\" (1.778 m)   Wt 169 lb (76.7 kg)   SpO2 98%   BMI 24.25 kg/m²     The patient appears well, alert, oriented x 3, in no distress. ENT normal.  Neck supple. No adenopathy or thyromegaly. SHAW.  Lungs are clear, good air entry, no wheezes, rhonchi or rales. S1 and S2 normal, no murmurs, regular rate and rhythm. Abdomen is soft without tenderness, guarding, mass or organomegaly.  exam: deferred. Extremities show no edema, normal peripheral pulses. Neurological is normal without focal findings. Assessment/Plan:   healthy adult male  lose weight, increase physical activity, follow low fat diet, follow low salt diet, continue present plan, routine labs ordered, call if any problems. ICD-10-CM ICD-9-CM    1. Well adult on routine health check  Z00.00 V70.0 CBC WITH AUTOMATED DIFF      THYROID CASCADE PROFILE      HEMOGLOBIN A1C WITH EAG      LIPID PANEL      PSA W/ REFLX FREE PSA      METABOLIC PANEL, COMPREHENSIVE   2. Essential hypertension  U93 663.7 METABOLIC PANEL, COMPREHENSIVE      dilTIAZem ER (CARDIZEM CD) 120 mg capsule   3. Hepatitis C virus infection without hepatic coma, unspecified chronicity  B19.20 070.70      Diagnoses and all orders for this visit:    1. Well adult on routine health check  -     CBC WITH AUTOMATED DIFF  -     THYROID CASCADE PROFILE  -     HEMOGLOBIN A1C WITH EAG  -     LIPID PANEL  -     PSA W/ REFLX FREE PSA  -     METABOLIC PANEL, COMPREHENSIVE; Future    2. Essential hypertension  -     METABOLIC PANEL, COMPREHENSIVE; Future  -     BP well controlled with dilTIAZem ER (CARDIZEM CD) 120 mg capsule; TAKE 1 CAPSULE BY MOUTH EVERY DAY    3. Hepatitis C virus infection without hepatic coma, unspecified chronicity      Currently on Rx. Follow-up and Dispositions    · Return in about 6 months (around 2/25/2021) for hypertension. current treatment plan is effective, no change in therapy  reviewed diet, exercise and weight control  reviewed medications and side effects in detail.

## 2020-08-28 LAB
ALBUMIN SERPL-MCNC: 4.1 G/DL (ref 3.8–4.8)
ALBUMIN/GLOB SERPL: 1.5 {RATIO} (ref 1.2–2.2)
ALP SERPL-CCNC: 54 IU/L (ref 39–117)
ALT SERPL-CCNC: 8 IU/L (ref 0–44)
AST SERPL-CCNC: 23 IU/L (ref 0–40)
BASOPHILS # BLD AUTO: 0 X10E3/UL (ref 0–0.2)
BASOPHILS NFR BLD AUTO: 1 %
BILIRUB SERPL-MCNC: 0.5 MG/DL (ref 0–1.2)
BUN SERPL-MCNC: 10 MG/DL (ref 8–27)
BUN/CREAT SERPL: 10 (ref 10–24)
CALCIUM SERPL-MCNC: 9.2 MG/DL (ref 8.6–10.2)
CHLORIDE SERPL-SCNC: 102 MMOL/L (ref 96–106)
CHOLEST SERPL-MCNC: 197 MG/DL (ref 100–199)
CO2 SERPL-SCNC: 26 MMOL/L (ref 20–29)
CREAT SERPL-MCNC: 1.02 MG/DL (ref 0.76–1.27)
EOSINOPHIL # BLD AUTO: 0.4 X10E3/UL (ref 0–0.4)
EOSINOPHIL NFR BLD AUTO: 9 %
ERYTHROCYTE [DISTWIDTH] IN BLOOD BY AUTOMATED COUNT: 12.3 % (ref 11.6–15.4)
EST. AVERAGE GLUCOSE BLD GHB EST-MCNC: 108 MG/DL
GLOBULIN SER CALC-MCNC: 2.7 G/DL (ref 1.5–4.5)
GLUCOSE SERPL-MCNC: 96 MG/DL (ref 65–99)
HBA1C MFR BLD: 5.4 % (ref 4.8–5.6)
HCT VFR BLD AUTO: 38.1 % (ref 37.5–51)
HDLC SERPL-MCNC: 96 MG/DL
HGB BLD-MCNC: 13 G/DL (ref 13–17.7)
IMM GRANULOCYTES # BLD AUTO: 0 X10E3/UL (ref 0–0.1)
IMM GRANULOCYTES NFR BLD AUTO: 0 %
LDLC SERPL CALC-MCNC: 90 MG/DL (ref 0–99)
LYMPHOCYTES # BLD AUTO: 1.8 X10E3/UL (ref 0.7–3.1)
LYMPHOCYTES NFR BLD AUTO: 41 %
MCH RBC QN AUTO: 33.4 PG (ref 26.6–33)
MCHC RBC AUTO-ENTMCNC: 34.1 G/DL (ref 31.5–35.7)
MCV RBC AUTO: 98 FL (ref 79–97)
MONOCYTES # BLD AUTO: 0.6 X10E3/UL (ref 0.1–0.9)
MONOCYTES NFR BLD AUTO: 14 %
NEUTROPHILS # BLD AUTO: 1.5 X10E3/UL (ref 1.4–7)
NEUTROPHILS NFR BLD AUTO: 35 %
PLATELET # BLD AUTO: 207 X10E3/UL (ref 150–450)
POTASSIUM SERPL-SCNC: 4.2 MMOL/L (ref 3.5–5.2)
PROT SERPL-MCNC: 6.8 G/DL (ref 6–8.5)
PSA SERPL-MCNC: 0.4 NG/ML (ref 0–4)
RBC # BLD AUTO: 3.89 X10E6/UL (ref 4.14–5.8)
REFLEX CRITERIA: NORMAL
SODIUM SERPL-SCNC: 142 MMOL/L (ref 134–144)
TRIGL SERPL-MCNC: 53 MG/DL (ref 0–149)
TSH SERPL DL<=0.005 MIU/L-ACNC: 3.56 UIU/ML (ref 0.45–4.5)
VLDLC SERPL CALC-MCNC: 11 MG/DL (ref 5–40)
WBC # BLD AUTO: 4.4 X10E3/UL (ref 3.4–10.8)

## 2020-08-28 NOTE — PROGRESS NOTES
Please mail letter( can't remember he is active on my chart or not)    CBC, kidney, liver, thyroid, PSA, cholesterol level is normal. Continue current med. No diabetes.

## 2020-10-19 DIAGNOSIS — J30.2 SEASONAL ALLERGIC RHINITIS, UNSPECIFIED TRIGGER: ICD-10-CM

## 2020-10-19 RX ORDER — FLUTICASONE PROPIONATE 50 MCG
SPRAY, SUSPENSION (ML) NASAL
Qty: 1 BOTTLE | Refills: 2 | Status: SHIPPED | OUTPATIENT
Start: 2020-10-19 | End: 2022-09-13 | Stop reason: SDUPTHER

## 2020-12-21 ENCOUNTER — OFFICE VISIT (OUTPATIENT)
Dept: HEMATOLOGY | Age: 70
End: 2020-12-21
Payer: COMMERCIAL

## 2020-12-21 VITALS
SYSTOLIC BLOOD PRESSURE: 134 MMHG | BODY MASS INDEX: 25.14 KG/M2 | WEIGHT: 175.6 LBS | RESPIRATION RATE: 14 BRPM | HEIGHT: 70 IN | HEART RATE: 79 BPM | TEMPERATURE: 97.2 F | DIASTOLIC BLOOD PRESSURE: 87 MMHG | OXYGEN SATURATION: 95 %

## 2020-12-21 DIAGNOSIS — B19.20 HEPATITIS C VIRUS INFECTION WITHOUT HEPATIC COMA, UNSPECIFIED CHRONICITY: Primary | ICD-10-CM

## 2020-12-21 LAB
ERYTHROCYTE [DISTWIDTH] IN BLOOD BY AUTOMATED COUNT: 12.5 % (ref 11.6–15.4)
HCT VFR BLD AUTO: 42.5 % (ref 37.5–51)
HGB BLD-MCNC: 14.7 G/DL (ref 13–17.7)
MCH RBC QN AUTO: 33.6 PG (ref 26.6–33)
MCHC RBC AUTO-ENTMCNC: 34.6 G/DL (ref 31.5–35.7)
MCV RBC AUTO: 97 FL (ref 79–97)
PLATELET # BLD AUTO: 231 X10E3/UL (ref 150–450)
RBC # BLD AUTO: 4.38 X10E6/UL (ref 4.14–5.8)
WBC # BLD AUTO: 5.8 X10E3/UL (ref 3.4–10.8)

## 2020-12-21 PROCEDURE — 99213 OFFICE O/P EST LOW 20 MIN: CPT | Performed by: NURSE PRACTITIONER

## 2020-12-21 NOTE — PROGRESS NOTES
Anthony Martin MD, Bonita Simmons MD Harless Hertz, PA-C Clance Como, ACNP-BC     April S Hans, [facility]   Reynaldo Sol, FNP-NAKIA Lopez, [facility]       Betito Sanchez Karri De Martinez 136    at [address], [address] [place] [address] Jayy Mckeon  22.    [phone]    FAX: [address] [place] [address], [address]    [phone]    FAX: [phone]     Patient Care Team:  Brayan Medina MD as PCP - General (Family Medicine)  Brayan Medina MD as PCP - [facility] Provider  Kody Marin MD (Psychiatry)  Lenka Leonard MD (Cardiology)  Masood Chapin MD (Gastroenterology)    Problem List  Date Reviewed: 8/25/2020          Codes Class Noted    H/O gastric ulcer ICD-10-CM: Z87.19  ICD-9-CM: V12.79  2/9/2017        Vitamin D deficiency ICD-10-CM: E55.9  ICD-9-CM: 268.9  5/19/2015        Cervical pain (neck) ICD-10-CM: M54.2  ICD-9-CM: 723.1  3/13/2015        Encounter for monitoring Suboxone maintenance therapy ICD-10-CM: Z51.81, Z79.899  ICD-9-CM: V58.83, V58.69  8/3/2011        H/O partial resection of colon ICD-10-CM: Z90.49  ICD-9-CM: V45.72  8/27/2010    Overview Signed 3/6/2020  8:58 AM by Mahendra Torres MD     Due to diverticulitis and colon stricture             Hypertension ICD-10-CM: I10  ICD-9-CM: 401.9  8/27/2010        Hepatitis C ICD-10-CM: B19.20  ICD-9-CM: 070.70  8/27/2010    Overview Signed 6/8/2017  9:43 AM by Brayan Medina MD     Was treated in 2000 per patient. Xiao Browne. is being seen at [address] for management of chronic HCV.  The active problem list, all pertinent past medical history, medications, radiologic findings and laboratory findings related to the liver disorder were reviewed with the patient. The patient is a 78 y.o. Black male who was found to have abnormalities in liver chemistries and subsequently tested positive for chronic HCV in 1990s. An ultrasound showed chronic liver disease. An assessment of liver fibrosis with elastography suggested no fibrosis. The patient was treated with peginterferon and ribavirin in early 2000s. He has completed 8 weeks of Mavyret. He completed treatment in September 2020. The patient has the following symptom which is thought to be due to the liver disease: fatigue. The patient is not currently experiencing the following symptoms of liver disease: pain in the right side over the liver. The patient completes all daily activities without any functional limitations. ASSESSMENT AND PLAN:  Chronic HCV   Chronic HCV with no fibrosis. Liver transaminases are elevated. ALP is normal. Liver function is normal. The platelet count is normal.    Have performed laboratory testing to monitor liver function and degree of liver injury. This included BMP, hepatic panel and CBC with platelet count. Chronic HCV treatment  The patient has previously been treated with peg interferon. He has completed 8 weeks of Glory Miguel Angel in 9/2020. He was negative at 4 weeks on treatment. He is here for SVR today. Screening for hepatocellular carcinoma  HCC screening is not necessary if the patient has no evidence of cirrhosis. Treatment of other medical problems in patients with chronic liver disease  There are no contraindications for the patient to take most medications necessary for treatment of other medical issues. Counseling for alcohol in patients with chronic liver disease  The patient was counseled regarding alcohol consumption and the effect of alcohol on chronic liver disease. The patient does not consume any significant amount of alcohol.     Vaccinations   Vaccination for viral hepatitis A and B is not needed. The patient has serologic evidence of prior exposure or vaccination with immunity. Routine vaccinations against other bacterial and viral agents can be performed as indicated. Annual flu vaccination should be administered if indicated. ALLERGIES  No Known Allergies    MEDICATIONS  Current Outpatient Medications   Medication Sig    fluticasone propionate (FLONASE) 50 mcg/actuation nasal spray SPRAY 2 SPRAYS INTO EACH NOSTRIL EVERY DAY AS NEEDED    dilTIAZem ER (CARDIZEM CD) 120 mg capsule TAKE 1 CAPSULE BY MOUTH EVERY DAY    cyclobenzaprine (FLEXERIL) 10 mg tablet Take 1 Tab by mouth three (3) times daily as needed for Muscle Spasm(s).  buprenorphine-naloxone (SUBOXONE) 8-2 mg Subl 2 Tabs by SubLINGual route daily. (Patient taking differently: 1.5 Tabs by SubLINGual route daily.)    multivitamin (ONE A DAY) tablet Take 1 Tab by mouth daily. No current facility-administered medications for this visit. SYSTEM REVIEW NOT RELATED TO LIVER DISEASE OR REVIEWED ABOVE:  Constitution systems: Negative for fever, chills, weight gain, weight loss. Eyes: Negative for visual changes. ENT: Negative for sore throat, painful swallowing. Respiratory: Negative for cough, hemoptysis, SOB. Cardiology: Negative for chest pain, palpitations. GI:  Negative for constipation or diarrhea. : Negative for urinary frequency, dysuria, hematuria, nocturia. Skin: Negative for rash. Hematology: Negative for easy bruising, blood clots. Musculo-skeletal: Negative for back pain, muscle pain, weakness. Neurologic: Negative for headaches, dizziness, vertigo, memory problems not related to HE. Psychology: Negative for anxiety, depression. FAMILY HISTORY:  The father  of ESRD. The mother is alive at age 80 years. He has the following chronic disease(s): dementia. There is no family history of liver disease. SOCIAL HISTORY:  The patient is .     The patient has 5 children and a lot of grandchildren. The patient stopped using tobacco products in 2020. The patient consumes 1 alcoholic beverages per day   The patient currently works full time for [employer] in [employer]. PHYSICAL EXAMINATION:  Visit Vitals  Resp 14   Ht 5' 10\" (1.778 m)   Wt 175 lb 9.6 oz (79.7 kg)   BMI 25.20 kg/m²     General: No acute distress. Eyes: Sclera anicteric. ENT: No oral lesions. Nodes: No adenopathy. Skin: No spider angiomata. No jaundice. No palmar erythema. Respiratory: Lungs clear to auscultation. Cardiovascular: Regular heart rate. No murmurs. No JVD. Abdomen: Soft non-tender. Liver size normal to percussion/palpation. Spleen not palpable. No obvious ascites. Extremities: No edema. No muscle wasting. No gross arthritic changes. Neurologic: Alert and oriented. Cranial nerves grossly intact. No asterixis.     LABORATORY STUDIES:  Liver [place] of [address] Units 8/25/2020 8/17/2020 3/6/2020   WBC 3.4 - 10.8 x10E3/uL 4.4 5.9 3.5   ANC 1.4 - 7.0 x10E3/uL 1.5  1.5   HGB 13.0 - 17.7 g/dL 13.0 13.9 14.5    - 450 x10E3/uL 207 179 203   INR 0.9 - 1.1        AST 0 - 40 IU/L 23 22 49 (H)   ALT 0 - 44 IU/L 8 12 34   Alk Phos 39 - 117 IU/L 54 57 43   Bili, Total 0.0 - 1.2 mg/dL 0.5 0.6 0.6   Bili, Direct 0.00 - 0.40 mg/dL  0.26 0.24   Albumin 3.8 - 4.8 g/dL 4.1 4.3 4.5   BUN 8 - 27 mg/dL 10 13 12   Creat 0.76 - 1.27 mg/dL 1.02 1.08 1.06   Na 134 - 144 mmol/L 142 141 143   K 3.5 - 5.2 mmol/L 4.2 4.6 4.7   Cl 96 - 106 mmol/L 102 100 101   CO2 20 - 29 mmol/L 26 24 26   Glucose 65 - 99 mg/dL 96 72 95     Liver [place] of [address] [place] [address] Ref Rng & Units 8/14/2019 6/19/2018   WBC 3.4 - 10.8 x10E3/uL 3.1 (L) 4.0   ANC 1.4 - 7.0 x10E3/uL 1.2 (L) 1.6   HGB 13.0 - 17.7 g/dL 13.9 14.1    - 450 x10E3/uL 191 194   INR 0.9 - 1.1       AST 0 - 40 IU/L 74 (H) 112 (H)   ALT 0 - 44 IU/L 50 (H) 72 (H)   Alk Phos 39 - 117 IU/L 42 38 (L)   Bili, Total 0.0 - 1.2 mg/dL 0.9 1.1   Bili, Direct 0.00 - 0.40 mg/dL     Albumin 3.8 - 4.8 g/dL 4.0 4.4   BUN 8 - 27 mg/dL 13 17   Creat 0.76 - 1.27 mg/dL 0.86 1.21   Na 134 - 144 mmol/L 142 141   K 3.5 - 5.2 mmol/L 4.9 4.5   Cl 96 - 106 mmol/L 101 101   CO2 20 - 29 mmol/L 26 24   Glucose 65 - 99 mg/dL 92 92     SEROLOGIES:  Virology Latest Ref Rng & Units 8/17/2020 3/6/2020   HCV RNA, JNOATHAN, QL Negative Negative Positive (A)     Serologies Latest Ref Rng & Units 3/6/2020 8/14/2019   Hep A Ab, Total Negative Positive (A)    Hep B Surface Ag Negative Negative    Hep B Core Ab, Total Negative Positive (A)    Hep B Surface AB QL  Non Reactive    Hep C Genotype   1a   HCV RT-PCR, Quant IU/mL 99,700 335,000   HCV Log10 log10 IU/mL  5.525     LIVER HISTOLOGY:  7/2020. [facility]. EkPa was 4.4. IQR/med 5%. . The results suggested a fibrosis level of F0. The CAP score suggests no evidence of fatty liver. ENDOSCOPIC PROCEDURES:  Not available or performed    RADIOLOGY:  3/2020. Ultrasound of liver. Echogenic consistent with fatty liver. No liver mass lesions. No dilated bile ducts. No ascites. OTHER TESTING:  Not available or performed    FOLLOW-UP:  All of the issues listed above in the assessment and plan were discussed with the patient. All questions were answered. The patient expressed a clear understanding of the above. [facility] months.     Elizabeth Snyder, BRAIN-BC  Liver [place] [address], [address] [facility]  [address] [facility]. [phone]

## 2020-12-22 LAB
ALBUMIN SERPL-MCNC: 4.7 G/DL (ref 3.8–4.8)
ALP SERPL-CCNC: 53 IU/L (ref 39–117)
ALT SERPL-CCNC: 11 IU/L (ref 0–44)
AST SERPL-CCNC: 29 IU/L (ref 0–40)
BILIRUB DIRECT SERPL-MCNC: 0.12 MG/DL (ref 0–0.4)
BILIRUB SERPL-MCNC: 0.4 MG/DL (ref 0–1.2)
BUN SERPL-MCNC: 14 MG/DL (ref 8–27)
BUN/CREAT SERPL: 12 (ref 10–24)
CALCIUM SERPL-MCNC: 9.6 MG/DL (ref 8.6–10.2)
CHLORIDE SERPL-SCNC: 102 MMOL/L (ref 96–106)
CO2 SERPL-SCNC: 25 MMOL/L (ref 20–29)
CREAT SERPL-MCNC: 1.2 MG/DL (ref 0.76–1.27)
GLUCOSE SERPL-MCNC: 89 MG/DL (ref 65–99)
POTASSIUM SERPL-SCNC: 5 MMOL/L (ref 3.5–5.2)
PROT SERPL-MCNC: 7.4 G/DL (ref 6–8.5)
SODIUM SERPL-SCNC: 143 MMOL/L (ref 134–144)

## 2020-12-23 LAB — HCV RNA SERPL QL NAA+PROBE: NEGATIVE

## 2021-01-07 ENCOUNTER — OFFICE VISIT (OUTPATIENT)
Dept: PRIMARY CARE CLINIC | Age: 71
End: 2021-01-07
Payer: COMMERCIAL

## 2021-01-07 VITALS
BODY MASS INDEX: 25.05 KG/M2 | HEIGHT: 70 IN | RESPIRATION RATE: 16 BRPM | WEIGHT: 175 LBS | DIASTOLIC BLOOD PRESSURE: 88 MMHG | SYSTOLIC BLOOD PRESSURE: 137 MMHG | OXYGEN SATURATION: 97 % | TEMPERATURE: 97.8 F | HEART RATE: 96 BPM

## 2021-01-07 DIAGNOSIS — I10 ESSENTIAL HYPERTENSION: ICD-10-CM

## 2021-01-07 DIAGNOSIS — Z01.818 PREOPERATIVE CLEARANCE: Primary | ICD-10-CM

## 2021-01-07 DIAGNOSIS — M21.612 BUNION OF GREAT TOE OF LEFT FOOT: ICD-10-CM

## 2021-01-07 DIAGNOSIS — R94.31 ABNORMAL EKG: ICD-10-CM

## 2021-01-07 PROCEDURE — 93000 ELECTROCARDIOGRAM COMPLETE: CPT | Performed by: FAMILY MEDICINE

## 2021-01-07 PROCEDURE — 99215 OFFICE O/P EST HI 40 MIN: CPT | Performed by: FAMILY MEDICINE

## 2021-01-07 NOTE — PROGRESS NOTES
Chief Complaint   Patient presents with    Pre-op Exam     L bunionectomy on 01/19/20     1. Have you been to the ER, urgent care clinic since your last visit? Hospitalized since your last visit? No    2. Have you seen or consulted any other health care providers outside of the [address] [place] [address] since your last visit? Include any pap smears or colon screening.  No

## 2021-01-07 NOTE — PROGRESS NOTES
Preoperative Evaluation    Date of Exam: 2021    Mica Rausch is a 78 y.o. male (:1950) who presents for preoperative evaluation. HTN: well controlled with Diltiazem 120 mg.  He is compliant with med. No hx cardiac hx. Denies any CP, soa, cough.     He finished  Hep C Rx  recently and his Hep C titre is negative.       Has been following up with psychiatrist for Suboxone Rx. Procedure/Surgery: left bunionectomy under [place]  Date of Procedure/Surgery: 2021  Surgeon: Dr. Maurice Hoang: [facility]  Primary Physician: Yuri Martinez MD  Latex Allergy: no    Problem List:     Patient Active Problem List    Diagnosis Date Noted    H/O gastric ulcer 2017    Vitamin D deficiency 2015    Cervical pain (neck) 2015    Encounter for monitoring Suboxone maintenance therapy 2011    H/O partial resection of colon 2010    Hypertension 2010    Hepatitis C 2010     Medical History:     Past Medical History:   Diagnosis Date    Hepatitis C     Hepatitis C 2010    Hypertension     PUD (peptic ulcer disease)     Seizures ([facility] [address]     as a child-none since ~     Allergies:   No Known Allergies   Medications:     Current Outpatient Medications   Medication Sig    fluticasone propionate (FLONASE) 50 mcg/actuation nasal spray SPRAY 2 SPRAYS INTO EACH NOSTRIL EVERY DAY AS NEEDED    dilTIAZem ER (CARDIZEM CD) 120 mg capsule TAKE 1 CAPSULE BY MOUTH EVERY DAY    buprenorphine-naloxone (SUBOXONE) 8-2 mg Subl 2 Tabs by SubLINGual route daily. (Patient taking differently: 1.5 Tabs by SubLINGual route daily.)    multivitamin (ONE A DAY) tablet Take 1 Tab by mouth daily.  cyclobenzaprine (FLEXERIL) 10 mg tablet Take 1 Tab by mouth three (3) times daily as needed for Muscle Spasm(s). No current facility-administered medications for this visit.       Surgical History:     Past Surgical History:   Procedure Laterality Date    HX APPENDECTOMY      HX GI      surgical repair of bleeding ulcer    HX GI      colon resection    HX HEENT      T&A    HX KNEE ARTHROSCOPY  1996    right knee    HX ORTHOPAEDIC  2003 & 2005    left knee     Social History:     Social History     Socioeconomic History    Marital status: SINGLE     Spouse name: Not on file    Number of children: Not on file    Years of education: Not on file    Highest education level: Not on file   Tobacco Use    Smoking status: Former Smoker    Smokeless tobacco: Never Used    Tobacco comment: quit May 2010   Substance and Sexual Activity    Alcohol use: Yes     Alcohol/week: 0.8 standard drinks     Types: 1 Cans of beer per week     Comment: occasional beer.  Drug use: No     Types: Heroin     Comment: quit 2007    Sexual activity: Not Currently       Recent use of: No recent use of aspirin (ASA), NSAIDS or steroids    Tetanus up to date: last tetanus booster within 10 years      Anesthesia Complications: None  History of abnormal bleeding : None  History of Blood Transfusions: no  Health Care Directive or Living Will: no    REVIEW OF SYSTEMS:  A comprehensive review of systems was negative except for that written in the HPI.     EXAM:   Visit Vitals  /88 (BP 1 Location: Left arm, BP Patient Position: Sitting)   Pulse 96   Temp 97.8 °F (36.6 °C) (Temporal)   Resp 16   Ht 5' 10\" (1.778 m)   Wt 175 lb (79.4 kg)   SpO2 97%   BMI 25.11 kg/m²     General appearance - alert, well appearing, and in no distress, oriented to person, place, and time and normal appearing weight  Mental status - alert, oriented to person, place, and time, normal mood, behavior, speech, dress, motor activity, and thought processes  Ears - bilateral TM's and external ear canals normal  Nose - normal and patent, no erythema, discharge or polyps  Mouth - mucous membranes moist, pharynx normal without lesions  Neck - supple, no significant adenopathy  Chest - clear to auscultation, no wheezes, rales or rhonchi, symmetric air entry  Heart - normal rate, regular rhythm, normal S1, S2, no murmurs, rubs, clicks or gallops  Abdomen - soft, nontender, nondistended, no masses or organomegaly  Neurological - alert, oriented, normal speech, no focal findings or movement disorder noted  Musculoskeletal - no joint tenderness, deformity or swelling  Extremities - no pedal edema noted. Bunion in left great toe. DIAGNOSTICS:   1. EKG: EKG FINDINGS - RBBB, 1st degree AV block  2. CXR: not needed. 3. Labs:   Lab Results   Component Value Date/Time    WBC 4.9 01/07/2021 12:00 AM    HGB 15.1 01/07/2021 12:00 AM    HCT 43.3 01/07/2021 12:00 AM    PLATELET 533 56/67/7132 12:00 AM    MCV 97 01/07/2021 12:00 AM     Lab Results   Component Value Date/Time    GFR est non-AA 84 01/07/2021 12:00 AM    GFRNA, POC >60 07/08/2010 06:57 PM    GFR est AA 97 01/07/2021 12:00 AM    GFRAA, POC >60 07/08/2010 06:57 PM    Creatinine 0.93 01/07/2021 12:00 AM    Creatinine (POC) 0.9 07/08/2010 06:57 PM    BUN 10 01/07/2021 12:00 AM    Sodium 140 01/07/2021 12:00 AM    Potassium 4.1 01/07/2021 12:00 AM    Chloride 99 01/07/2021 12:00 AM    CO2 29 01/07/2021 12:00 AM       IMPRESSION:   Low risk for surgery. No contraindications to planned surgery. EKG is slightly abnormal but he is at low risk at this point. ICD-10-CM ICD-9-CM    1. Preoperative clearance  Z01.818 V72.84 AMB POC EKG ROUTINE W/ 12 LEADS, INTER & REP      CBC WITH AUTOMATED DIFF      METABOLIC PANEL, BASIC      CBC WITH AUTOMATED DIFF      METABOLIC PANEL, BASIC   2. Bunion of great toe of left foot  M21.612 727.1 CBC WITH AUTOMATED DIFF      METABOLIC PANEL, BASIC      CBC WITH AUTOMATED DIFF      METABOLIC PANEL, BASIC   3.  Abnormal EKG  R94.31 794.31 REFERRAL TO CARDIOLOGY   4. Essential hypertension  I10 401.9          Chaim Hawkins MD   1/7/2021

## 2021-01-08 LAB
BASOPHILS # BLD AUTO: 0.1 X10E3/UL (ref 0–0.2)
BASOPHILS NFR BLD AUTO: 1 %
BUN SERPL-MCNC: 10 MG/DL (ref 8–27)
BUN/CREAT SERPL: 11 (ref 10–24)
CALCIUM SERPL-MCNC: 10.2 MG/DL (ref 8.6–10.2)
CHLORIDE SERPL-SCNC: 99 MMOL/L (ref 96–106)
CO2 SERPL-SCNC: 29 MMOL/L (ref 20–29)
CREAT SERPL-MCNC: 0.93 MG/DL (ref 0.76–1.27)
EOSINOPHIL # BLD AUTO: 0.3 X10E3/UL (ref 0–0.4)
EOSINOPHIL NFR BLD AUTO: 5 %
ERYTHROCYTE [DISTWIDTH] IN BLOOD BY AUTOMATED COUNT: 12.6 % (ref 11.6–15.4)
GLUCOSE SERPL-MCNC: 76 MG/DL (ref 65–99)
HCT VFR BLD AUTO: 43.3 % (ref 37.5–51)
HGB BLD-MCNC: 15.1 G/DL (ref 13–17.7)
IMM GRANULOCYTES # BLD AUTO: 0 X10E3/UL (ref 0–0.1)
IMM GRANULOCYTES NFR BLD AUTO: 0 %
LYMPHOCYTES # BLD AUTO: 1.4 X10E3/UL (ref 0.7–3.1)
LYMPHOCYTES NFR BLD AUTO: 29 %
MCH RBC QN AUTO: 33.9 PG (ref 26.6–33)
MCHC RBC AUTO-ENTMCNC: 34.9 G/DL (ref 31.5–35.7)
MCV RBC AUTO: 97 FL (ref 79–97)
MONOCYTES # BLD AUTO: 0.5 X10E3/UL (ref 0.1–0.9)
MONOCYTES NFR BLD AUTO: 11 %
NEUTROPHILS # BLD AUTO: 2.6 X10E3/UL (ref 1.4–7)
NEUTROPHILS NFR BLD AUTO: 54 %
PLATELET # BLD AUTO: 234 X10E3/UL (ref 150–450)
POTASSIUM SERPL-SCNC: 4.1 MMOL/L (ref 3.5–5.2)
RBC # BLD AUTO: 4.46 X10E6/UL (ref 4.14–5.8)
SODIUM SERPL-SCNC: 140 MMOL/L (ref 134–144)
WBC # BLD AUTO: 4.9 X10E3/UL (ref 3.4–10.8)

## 2021-01-08 NOTE — PROGRESS NOTES
Sent via my chart.     Hi,    CBC and kidney function is normal.    I handed the form to nurse to fax over. Thanks.   Dr. Johnson

## 2021-03-05 DIAGNOSIS — I10 ESSENTIAL HYPERTENSION: ICD-10-CM

## 2021-03-08 RX ORDER — DILTIAZEM HYDROCHLORIDE 120 MG/1
CAPSULE, COATED, EXTENDED RELEASE ORAL
Qty: 90 CAP | Refills: 1 | Status: SHIPPED | OUTPATIENT
Start: 2021-03-08 | End: 2021-09-02

## 2021-05-06 ENCOUNTER — OFFICE VISIT (OUTPATIENT)
Dept: CARDIOLOGY CLINIC | Age: 71
End: 2021-05-06
Payer: COMMERCIAL

## 2021-05-06 VITALS
DIASTOLIC BLOOD PRESSURE: 90 MMHG | SYSTOLIC BLOOD PRESSURE: 130 MMHG | BODY MASS INDEX: 25.62 KG/M2 | HEART RATE: 67 BPM | HEIGHT: 70 IN | OXYGEN SATURATION: 98 % | RESPIRATION RATE: 16 BRPM | WEIGHT: 179 LBS

## 2021-05-06 DIAGNOSIS — I45.10 INCOMPLETE RBBB: ICD-10-CM

## 2021-05-06 DIAGNOSIS — I44.0 1ST DEGREE AV BLOCK: ICD-10-CM

## 2021-05-06 DIAGNOSIS — I10 ESSENTIAL HYPERTENSION: ICD-10-CM

## 2021-05-06 DIAGNOSIS — R07.9 CHEST PAIN, UNSPECIFIED TYPE: Primary | ICD-10-CM

## 2021-05-06 PROCEDURE — 99204 OFFICE O/P NEW MOD 45 MIN: CPT | Performed by: INTERNAL MEDICINE

## 2021-05-06 RX ORDER — DICLOFENAC SODIUM 75 MG/1
75 TABLET, DELAYED RELEASE ORAL DAILY
COMMUNITY
Start: 2021-05-03 | End: 2022-06-14

## 2021-05-06 NOTE — PROGRESS NOTES
Cardiac Electrophysiology OFFICE Consultation Note     Subjective:      Angeles Bynum is a 73 y.o. patient who is seen for evaluation/management of abnormal ECG. He was kindly referred by Dr. Susie Mahmood. ECG had been done in 01/2021 for properative clearance (bunionectomy). ECG showed NSR with incomplete RBBB & 1st degree AVB. Review of prior ECG in 2017 also showed incomplete RBBB, but 1st degree AVB wasn't present then. He reports occasional chest heaviness, not correlated with exertion. Occasional mild SOB, but inconsistent. Also with occasional inconsistent lightheadedness. No syncope. Occasionally \"hears\" his heart beating, but denies rapid palpitations. Diastolic BP borderline high. Previous:  Evaluated for chest discomfort in 2014, had normal stress echo. Echo 2014 with normal LVEF and mild MR TR    Father had heart problems (specifics unknown) in his 50s-60s, had multiple hospitalizations. Other family members on paternal side with heart disease. Problem List  Date Reviewed: 1/7/2021          Codes Class Noted    H/O gastric ulcer ICD-10-CM: Z87.11  ICD-9-CM: V12.79  2/9/2017        Vitamin D deficiency ICD-10-CM: E55.9  ICD-9-CM: 268.9  5/19/2015        Cervical pain (neck) ICD-10-CM: M54.2  ICD-9-CM: 723.1  3/13/2015        Encounter for monitoring Suboxone maintenance therapy ICD-10-CM: Z51.81, Z79.899  ICD-9-CM: V58.83, V58.69  8/3/2011        H/O partial resection of colon ICD-10-CM: Z90.49  ICD-9-CM: V45.72  8/27/2010    Overview Signed 3/6/2020  8:58 AM by Dian Crigler, MD     Due to diverticulitis and colon stricture             Hypertension ICD-10-CM: I10  ICD-9-CM: 401.9  8/27/2010        Hepatitis C ICD-10-CM: B19.20  ICD-9-CM: 070.70  8/27/2010    Overview Signed 6/8/2017  9:43 AM by Virgil Raphael MD     Was treated in 2000 per patient.                     Current Outpatient Medications   Medication Sig Dispense Refill    diclofenac EC (VOLTAREN) 75 mg EC tablet       dilTIAZem ER (CARDIZEM CD) 120 mg capsule TAKE 1 CAPSULE BY MOUTH EVERY DAY 90 Cap 1    fluticasone propionate (FLONASE) 50 mcg/actuation nasal spray SPRAY 2 SPRAYS INTO EACH NOSTRIL EVERY DAY AS NEEDED 1 Bottle 2    buprenorphine-naloxone (SUBOXONE) 8-2 mg Subl 2 Tabs by SubLINGual route daily. (Patient taking differently: 1.5 Tabs by SubLINGual route daily.) 60 Tab 2    multivitamin (ONE A DAY) tablet Take 1 Tab by mouth daily. No Known Allergies  Past Medical History:   Diagnosis Date    Hepatitis C     Hepatitis C 8/27/2010    Hypertension     PUD (peptic ulcer disease)     Seizures ([facility] [address]     as a child-none since ~1976     Past Surgical History:   Procedure Laterality Date    HX APPENDECTOMY      HX GI      surgical repair of bleeding ulcer    HX GI      colon resection    HX HEENT      T&A    HX KNEE ARTHROSCOPY  1996    right knee    HX ORTHOPAEDIC  2003 & 2005    left knee     Family History   Problem Relation Age of Onset    Heart Disease Mother     Diabetes Father     Diabetes Sister     Diabetes Paternal Grandmother      Social History     Tobacco Use    Smoking status: Former Smoker    Smokeless tobacco: Never Used    Tobacco comment: quit May 2010   Substance Use Topics    Alcohol use: Yes     Alcohol/week: 0.8 standard drinks     Types: 1 Cans of beer per week     Comment: occasional beer. Review of Systems: Review of all other systems otherwise negative. Constitutional: Negative for fever, chills, weight loss, malaise/fatigue. HEENT: Negative for nosebleeds, vision changes. Respiratory: Negative for cough, hemoptysis  Cardiovascular: + occasional chest heaviness, no palpitations, orthopnea, claudication, leg swelling, syncope, and PND. + occasional lightheadedness. Gastrointestinal: Negative for nausea, vomiting, diarrhea, blood in stool and melena. Genitourinary: Negative for dysuria, and hematuria.    Musculoskeletal: Negative for myalgias, arthralgia. Skin: Negative for rash. Heme: Does not bleed or bruise easily. Neurological: Negative for speech change and focal weakness. Objective:     Visit Vitals  BP (!) 130/90 (BP 1 Location: Left arm, BP Patient Position: Sitting, BP Cuff Size: Adult)   Pulse 67   Resp 16   Ht 5' 10\" (1.778 m)   Wt 179 lb (81.2 kg)   SpO2 98%   BMI 25.68 kg/m²      Physical Exam:   Constitutional: Well-developed and well-nourished. No respiratory distress. Head: Normocephalic and atraumatic. Eyes: Pupils are equal, round. ENT: Hearing grossly normal.  Neck: Supple. No JVD present. Cardiovascular: Normal rate, regular rhythm. Exam reveals no gallop and no friction rub. No murmur heard. Pulmonary/Chest: Effort normal and breath sounds normal. No wheezes. Abdominal: Soft, no tenderness. Musculoskeletal: Moves extremities independently. Normal gait. Vasc/lymphatic: No edema. Neurological: Alert,oriented. Skin: Skin is warm and dry. Psychiatric: Normal mood and affect. Behavior is normal. Judgment and thought content normal.      EKG: NSR with incomplete RBBB, 1st degree AVB. Assessment/Plan:     Imaging/Studies:  Echo (02/03/2014): LVEF 60%. Mild JEYSON. Mild MR, AR, & TR.    Stress echo (01/2014): LVEF 60-65%, mild LVH. No significant ECG changes. Normal regional LV function with stress. ICD-10-CM ICD-9-CM    1. Chest pain, unspecified type  R07.9 786.50 NUCLEAR CARDIAC STRESS TEST   2. Essential hypertension  I10 401.9    3. 1st degree AV block  I44.0 426.11    4. Incomplete RBBB  I45.10 426. 4        Chest discomfort: Occasional, may last a couple minutes, onset may be at rest or with exertion. Family history of heart disease in 55s-56s (father). Stress echo in 2014 showed no ischemia. Will recommend Lexiscan cardiolite stress test to rule out ischemia (exercise avoided due to COVID-19 pandemic). Conduction abnormality: Incomplete RBBB present on 2017 ECG as well.   1st degree AVB new but mild and he can continue with cardizem for now. No syncope. Continue to monitor. HTN: Borderline high diastolic BP today. Mild LVH had been noted in 2014. Continue diltiazem  mg po daily for now. Will recheck BP at time of stress test; if not well controlled, would need to increase/add medication. MR/AR/TR: Mild per echo in 2014. No murmur today. EP clinic follow up pending nuclear stress test results. Addendum   06/21/21    ECHO ADULT COMPLETE 06/23/2021 6/23/2021    Interpretation Summary  · LV: Calculated LVEF is 62%. Biplane method used to measure ejection fraction. Normal cavity size and systolic function (ejection fraction normal). Upper normal wall thickness. Wall motion: normal. Mild (grade 1) left ventricular diastolic dysfunction. · LA: Moderately dilated left atrium. · RV: Mildly dilated right ventricle. · RA: Severely dilated right atrium. · MV: Mitral valve thickening. Mild mitral valve regurgitation is present. · TV: Mild tricuspid valve regurgitation is present. Right Ventricular Arterial Pressure (RVSP) is 29 mmHg. Pulmonary hypertension not suggested by Doppler findings. · PV: Mild pulmonic valve regurgitation is present. · AO: Mild ascending aorta dilatation. Ascending aorta diameter = 4.2 cm. Signed by: Abdi Platt MD on 6/23/2021  6:49 AM    Normal LVEF  Enlarged atria  Do not see mobile lesion in RA  Mild dilation of aorta  Continue to follow up   Will need chest CT with contrast if still has chest pain          Future Appointments   Date Time Provider Adriano Nilda   5/19/2021  8:00 AM MIKAELA QUINONES BS AMB   6/21/2021  9:45 AM RUY Armenta BS AMB     Thank you for involving me in this patient's care and please call with further concerns or questions. Bernice Vinson M.D.   Electrophysiology/Cardiology  [facility] [place]  [address], [place] [address], [place] [facility], [address] [place] [address]  [phone]

## 2021-05-06 NOTE — PATIENT INSTRUCTIONS
You will be scheduled for nuclear stress testing after your appointment today. Wear comfortable clothing (shorts or pants with a shirt or blouse- no underwire bras) and walking or athletic shoes. Do not eat or drink anything, except water, for at least 2 hours prior to your appointment. Avoid tobacco products for at least 6 hours prior to your test. 
 
Do not eat or drink anything containing caffeine, including but not limited to the following: chocolate, regular and decaffeinated coffee, soft drinks, or tea for at least 24 hours prior to your test. 
 
Do not hold your scheduled medications prior to your test. 
 
Your test will be performed on a 1 day protocol. This is determined by your height, weight, and other risk factors. For a 1 day test, please allow for 4 hours in the office that day. The radioactive isotope used for your testing is different from any of the dyes that are commonly used in x-ray procedures, and is ordered specially for your test. Please call to cancel or reschedule your appointment at least 24 hours prior to your scheduled appointment to avoid being billed for the expensive isotope.

## 2021-05-18 ENCOUNTER — TELEPHONE (OUTPATIENT)
Dept: CARDIOLOGY CLINIC | Age: 71
End: 2021-05-18

## 2021-05-18 NOTE — TELEPHONE ENCOUNTER
Good Afternoon,    The Nuc Dr. Janna Buenrostro ordered is STILL in review.     Please call the patient to r/s this appt    [facility]

## 2021-05-18 NOTE — TELEPHONE ENCOUNTER
Contacted patient regarding PENDING auth for tomorrow Nuc. Verified patient by two identifiers. Advised patient that his nuclear test is currently under review with his insurance company and will need to be rescheduled at this time. Patient verbalized understanding and had no additional questions.     Future Appointments   Date Time Provider Adriano Munguia   6/7/2021 10:00 AM MIKAELA QUINONES AMB   6/21/2021  9:45 AM Viridiana Fraga., RUY MENDOZA BS AMB

## 2021-05-19 ENCOUNTER — TELEPHONE (OUTPATIENT)
Dept: CARDIOLOGY CLINIC | Age: 71
End: 2021-05-19

## 2021-05-19 DIAGNOSIS — R07.9 CHEST PAIN, UNSPECIFIED TYPE: Primary | ICD-10-CM

## 2021-05-19 DIAGNOSIS — I45.10 INCOMPLETE RBBB: ICD-10-CM

## 2021-05-19 DIAGNOSIS — I44.0 1ST DEGREE AV BLOCK: ICD-10-CM

## 2021-05-19 NOTE — TELEPHONE ENCOUNTER
Good Morning,    The Nuc Dr. Lisa Fam ordered was DENIED    [address] can be reached at [phone] with order number of [id number]    Please call the patient to CANCEL this appt    [facility]

## 2021-05-20 NOTE — TELEPHONE ENCOUNTER
Verified patient with two types of identifiers. Notified patient of nuclear stress test denial and MD recommendation to order stress echocardiogram. Order placed. Reviewed pre testing instructions. Patient verbalized understanding and will call with any other questions.       Future Appointments   Date Time Provider Adriano Munguia   6/7/2021 11:00 AM MIKAELA ROSAS AMB   6/7/2021 11:00 AM STRESSECHMIKAELA SANFORD AMB   6/21/2021  9:45 AM Emofitz Crutch., NP KELLY ERVIN AMB

## 2021-06-07 ENCOUNTER — APPOINTMENT (OUTPATIENT)
Dept: CARDIOLOGY CLINIC | Age: 71
End: 2021-06-07

## 2021-06-07 ENCOUNTER — ANCILLARY PROCEDURE (OUTPATIENT)
Dept: CARDIOLOGY CLINIC | Age: 71
End: 2021-06-07

## 2021-06-07 ENCOUNTER — TELEPHONE (OUTPATIENT)
Dept: CARDIOLOGY CLINIC | Age: 71
End: 2021-06-07

## 2021-06-07 ENCOUNTER — DOCUMENTATION ONLY (OUTPATIENT)
Dept: CARDIOLOGY CLINIC | Age: 71
End: 2021-06-07

## 2021-06-07 DIAGNOSIS — I45.10 INCOMPLETE RBBB: ICD-10-CM

## 2021-06-07 DIAGNOSIS — I51.89 RIGHT ATRIAL MASS: Primary | ICD-10-CM

## 2021-06-07 DIAGNOSIS — I44.0 1ST DEGREE AV BLOCK: ICD-10-CM

## 2021-06-07 DIAGNOSIS — R07.9 CHEST PAIN, UNSPECIFIED TYPE: ICD-10-CM

## 2021-06-07 PROCEDURE — 93351 STRESS TTE COMPLETE: CPT | Performed by: INTERNAL MEDICINE

## 2021-06-07 NOTE — TELEPHONE ENCOUNTER
I called and talked to him about RA mass noted on stress echo  He agrees to come back another time for 2 D echo

## 2021-06-07 NOTE — PROGRESS NOTES
There is a pedunculated mobile density in the right atrium on stress echo images  No ischemia on stress echo images    Need to confirm right atrial mass with a dedicated full 2 D echo before requesting chest CT and DEIDRE for possible thrombus or malignancy

## 2021-06-07 NOTE — PROGRESS NOTES
Called pt two patient identifiers confirmed. Notified pt about Stress Echo results and Dr. Espinoza Courser recommendations. Scheduled pt for Full echo on 6/21/2021 @ 1:00 pm. Pt verbalized understanding of information discussed w/ no further questions at this time.

## 2021-06-08 LAB
STRESS ANGINA INDEX: 0
STRESS BASELINE DIAS BP: 90 MMHG
STRESS BASELINE HR: 76 BPM
STRESS BASELINE SYS BP: 130 MMHG
STRESS ESTIMATED WORKLOAD: 8.5 METS
STRESS EXERCISE DUR MIN: NORMAL
STRESS O2 SAT PEAK: 96 %
STRESS PEAK DIAS BP: 90 MMHG
STRESS PEAK SYS BP: 144 MMHG
STRESS PERCENT HR ACHIEVED: 97 %
STRESS POST PEAK HR: 146 BPM
STRESS RATE PRESSURE PRODUCT: NORMAL BPM*MMHG
STRESS ST DEPRESSION: 0 MM
STRESS ST ELEVATION: 0 MM
STRESS TARGET HR: 151 BPM

## 2021-06-21 ENCOUNTER — OFFICE VISIT (OUTPATIENT)
Dept: HEMATOLOGY | Age: 71
End: 2021-06-21
Payer: COMMERCIAL

## 2021-06-21 ENCOUNTER — ANCILLARY PROCEDURE (OUTPATIENT)
Dept: CARDIOLOGY CLINIC | Age: 71
End: 2021-06-21

## 2021-06-21 VITALS
OXYGEN SATURATION: 97 % | SYSTOLIC BLOOD PRESSURE: 135 MMHG | RESPIRATION RATE: 17 BRPM | HEART RATE: 77 BPM | TEMPERATURE: 96.9 F | HEIGHT: 70 IN | WEIGHT: 172.6 LBS | BODY MASS INDEX: 24.71 KG/M2 | DIASTOLIC BLOOD PRESSURE: 89 MMHG

## 2021-06-21 VITALS
HEIGHT: 70 IN | WEIGHT: 172 LBS | SYSTOLIC BLOOD PRESSURE: 134 MMHG | BODY MASS INDEX: 24.62 KG/M2 | DIASTOLIC BLOOD PRESSURE: 90 MMHG

## 2021-06-21 DIAGNOSIS — I51.89 RIGHT ATRIAL MASS: ICD-10-CM

## 2021-06-21 DIAGNOSIS — B19.20 HEPATITIS C VIRUS INFECTION WITHOUT HEPATIC COMA, UNSPECIFIED CHRONICITY: Primary | ICD-10-CM

## 2021-06-21 PROCEDURE — 99213 OFFICE O/P EST LOW 20 MIN: CPT | Performed by: NURSE PRACTITIONER

## 2021-06-21 PROCEDURE — 93306 TTE W/DOPPLER COMPLETE: CPT | Performed by: INTERNAL MEDICINE

## 2021-06-21 NOTE — PROGRESS NOTES
Identified pt with two pt identifiers(name and ). Reviewed record in preparation for visit and have obtained necessary documentation. No chief complaint on file. Vitals:    21 0935   BP: 135/89   Pulse: 77   Resp: 17   Temp: 96.9 °F (36.1 °C)   TempSrc: Temporal   SpO2: 97%   Weight: 172 lb 9.6 oz (78.3 kg)   Height: 5' 10\" (1.778 m)   PainSc:   0 - No pain       Health Maintenance Review: Patient reminded of \"due or due soon\" health maintenance. I have asked the patient to contact his/her primary care provider (PCP) for follow-up on his/her health maintenance. Coordination of Care Questionnaire:  :   1) Have you been to an emergency room, urgent care, or hospitalized since your last visit? If yes, where when, and reason for visit? no       2. Have seen or consulted any other health care provider since your last visit? If yes, where when, and reason for visit? Yes, Cardiologist for EKG       Patient is accompanied by self I have received verbal consent from Nini Santos. to discuss any/all medical information while they are present in the room.

## 2021-06-21 NOTE — PROGRESS NOTES
[address] [place] [address], MD, Burrell Loupe, MD [place], PA-C Vito Landau, [facility]     April S Hans, [facility]   Mil López POONAM-NAKIA Lockhart [facility]       Betito Deputado Karri De Martinez 136    at [address], [address] [place] [address], [facility] 22.    [phone]    FAX: [address] [place] [address], [address]    [phone]    FAX: [phone]     Patient Care Team:  Virgil Raphael MD as PCP - General (Family Medicine)  Virgil Raphael MD as PCP - [facility] Provider  Debbie García MD (Psychiatry)  Marla Contreras MD (Gastroenterology)    Problem List  Date Reviewed: 5/6/2021        Codes Class Noted    H/O gastric ulcer ICD-10-CM: Z87.11  ICD-9-CM: V12.79  2/9/2017        Vitamin D deficiency ICD-10-CM: E55.9  ICD-9-CM: 268.9  5/19/2015        Cervical pain (neck) ICD-10-CM: M54.2  ICD-9-CM: 723.1  3/13/2015        Encounter for monitoring Suboxone maintenance therapy ICD-10-CM: Z51.81, Z79.899  ICD-9-CM: V58.83, V58.69  8/3/2011        H/O partial resection of colon ICD-10-CM: Z90.49  ICD-9-CM: V45.72  8/27/2010    Overview Signed 3/6/2020  8:58 AM by Dian Crigler, MD     Due to diverticulitis and colon stricture             Hypertension ICD-10-CM: I10  ICD-9-CM: 401.9  8/27/2010        Hepatitis C ICD-10-CM: B19.20  ICD-9-CM: 070.70  8/27/2010    Overview Signed 6/8/2017  9:43 AM by Virgil Raphael MD     Was treated in 2000 per patient. [facility]yn[facility].  The active problem list, all pertinent past medical history, medications, radiologic findings and laboratory findings related to the liver disorder were reviewed with the patient. The patient is a 73 y.o. Black male who was found to have abnormalities in liver chemistries and subsequently tested positive for chronic HCV in 1990s. He has completed 8 weeks of Mavyret. He completed treatment in September 2020. He has achieved SVR. The patient has the following symptom which is thought to be due to the liver disease: none. The patient is not currently experiencing the following symptoms of liver disease: pain in the right side over the liver, yellowing of the eyes or skin, trouble concentrating or swelling in the abdomen. The patient completes all daily activities without any functional limitations. ASSESSMENT AND PLAN:  Chronic HCV   Chronic HCV with no fibrosis. Liver transaminases are normal. ALP is normal. Liver function is normal. The platelet count is normal.      Have performed laboratory testing to monitor liver function and degree of liver injury. This included BMP, hepatic panel and CBC with platelet count. Chronic HCV treatment  The patient has previously been treated with peg interferon. He has completed 8 weeks of Hermila Almodovar in 9/2020. He has achieved SVR. Will recheck viral load today. Screening for hepatocellular carcinoma  HCC screening is not necessary if the patient has no evidence of cirrhosis. Treatment of other medical problems in patients with chronic liver disease  There are no contraindications for the patient to take most medications necessary for treatment of other medical issues. Counseling for alcohol in patients with chronic liver disease  The patient was counseled regarding alcohol consumption and the effect of alcohol on chronic liver disease. The patient does not consume any significant amount of alcohol. Vaccinations   Vaccination for viral hepatitis A and B is not needed. The patient has serologic evidence of prior exposure or vaccination with immunity.  Routine vaccinations against other bacterial and viral agents can be performed as indicated. Annual flu vaccination should be administered if indicated. ALLERGIES  No Known Allergies    MEDICATIONS  Current Outpatient Medications   Medication Sig    diclofenac EC (VOLTAREN) 75 mg EC tablet     dilTIAZem ER (CARDIZEM CD) 120 mg capsule TAKE 1 CAPSULE BY MOUTH EVERY DAY    fluticasone propionate (FLONASE) 50 mcg/actuation nasal spray SPRAY 2 SPRAYS INTO EACH NOSTRIL EVERY DAY AS NEEDED    buprenorphine-naloxone (SUBOXONE) 8-2 mg Subl 2 Tabs by SubLINGual route daily. (Patient taking differently: 1.5 Tabs by SubLINGual route daily.)    multivitamin (ONE A DAY) tablet Take 1 Tab by mouth daily. No current facility-administered medications for this visit. FAMILY HISTORY:  The father  of ESRD. The mother is alive at age 80 years. He has the following chronic disease(s): dementia. There is no family history of liver disease. SOCIAL HISTORY:  The patient is . The patient has 5 children and a lot of grandchildren. The patient stopped using tobacco products in . The patient consumes 1 alcoholic beverages per day   The patient currently works full time for [employer] in [employer] PHYSICAL EXAMINATION:  Visit Vitals  /89 (BP 1 Location: Left upper arm, BP Patient Position: Sitting, BP Cuff Size: Adult)   Pulse 77   Temp 96.9 °F (36.1 °C) (Temporal)   Resp 17   Ht 5' 10\" (1.778 m)   Wt 172 lb 9.6 oz (78.3 kg)   SpO2 97%   BMI 24.77 kg/m²     General: No acute distress. Eyes: Sclera anicteric. ENT: No oral lesions. Nodes: No adenopathy. Skin: No spider angiomata. No jaundice. No palmar erythema. Respiratory: Lungs clear to auscultation. Cardiovascular: Regular heart rate. No murmurs. No JVD. Abdomen: Soft non-tender. Liver size normal to percussion/palpation. Spleen not palpable. No obvious ascites. Extremities: No edema. No muscle wasting.  No gross arthritic changes. Neurologic: Alert and oriented. Cranial nerves grossly intact. No asterixis. LABORATORY STUDIES:  Liver [place] of [address] Units 1/7/2021 12/21/2020   WBC 3.4 - 10.8 x10E3/uL 4.9 5.8   ANC 1.4 - 7.0 x10E3/uL 2.6    HGB 13.0 - 17.7 g/dL 15.1 14.7    - 450 x10E3/uL 234 231   AST 0 - 40 IU/L  29   ALT 0 - 44 IU/L  11   Alk Phos 39 - 117 IU/L  53   Bili, Total 0.0 - 1.2 mg/dL  0.4   Bili, Direct 0.00 - 0.40 mg/dL  0.12   Albumin 3.8 - 4.8 g/dL  4.7   BUN 8 - 27 mg/dL 10 14   Creat 0.76 - 1.27 mg/dL 0.93 1.20   Na 134 - 144 mmol/L 140 143   K 3.5 - 5.2 mmol/L 4.1 5.0   Cl 96 - 106 mmol/L 99 102   CO2 20 - 29 mmol/L 29 25   Glucose 65 - 99 mg/dL 76 89     Liver [place] of [address] [place] [address] Ref Rng & Units 8/25/2020 8/17/2020   WBC 3.4 - 10.8 x10E3/uL 4.4 5.9   ANC 1.4 - 7.0 x10E3/uL 1.5    HGB 13.0 - 17.7 g/dL 13.0 13.9    - 450 x10E3/uL 207 179   AST 0 - 40 IU/L 23 22   ALT 0 - 44 IU/L 8 12   Alk Phos 39 - 117 IU/L 54 57   Bili, Total 0.0 - 1.2 mg/dL 0.5 0.6   Bili, Direct 0.00 - 0.40 mg/dL  0.26   Albumin 3.8 - 4.8 g/dL 4.1 4.3   BUN 8 - 27 mg/dL 10 13   Creat 0.76 - 1.27 mg/dL 1.02 1.08   Na 134 - 144 mmol/L 142 141   K 3.5 - 5.2 mmol/L 4.2 4.6   Cl 96 - 106 mmol/L 102 100   CO2 20 - 29 mmol/L 26 24   Glucose 65 - 99 mg/dL 96 72     SEROLOGIES:  Virology Latest Ref Rng & Units 12/21/2020 8/17/2020 3/6/2020   HCV RNA, JONATHAN, QL Negative Negative Negative Positive (A)     Serologies Latest Ref Rng & Units 3/6/2020 8/14/2019   Hep A Ab, Total Negative Positive (A)    Hep B Surface Ag Negative Negative    Hep B Core Ab, Total Negative Positive (A)    Hep B Surface AB QL  Non Reactive    Hep C Genotype   1a   HCV RT-PCR, Quant IU/mL 99,700 335,000   HCV Log10 log10 IU/mL  5.525     LIVER HISTOLOGY:  7/2020. [facility]. EkPa was 4.4. IQR/med 5%. . The results suggested a fibrosis level of F0.  The CAP score suggests no evidence of fatty liver. ENDOSCOPIC PROCEDURES:  Not available or performed    RADIOLOGY:  3/2020. Ultrasound of liver. Echogenic consistent with fatty liver. No liver mass lesions. No dilated bile ducts. No ascites. OTHER TESTING:  Not available or performed    FOLLOW-UP:  All of the issues listed above in the assessment and plan were discussed with the patient. All questions were answered. The patient expressed a clear understanding of the above. Return to [place] [address] in 3 months for last visit.     Marquis Bah, [facility]  Liver [place] [address], [address] Jayy Siegel  22.  [phone]

## 2021-06-22 LAB
ALBUMIN SERPL-MCNC: 4.9 G/DL (ref 3.8–4.8)
ALP SERPL-CCNC: 64 IU/L (ref 48–121)
ALT SERPL-CCNC: 11 IU/L (ref 0–44)
AST SERPL-CCNC: 28 IU/L (ref 0–40)
BILIRUB DIRECT SERPL-MCNC: 0.25 MG/DL (ref 0–0.4)
BILIRUB SERPL-MCNC: 0.7 MG/DL (ref 0–1.2)
BUN SERPL-MCNC: 22 MG/DL (ref 8–27)
BUN/CREAT SERPL: 19 (ref 10–24)
CALCIUM SERPL-MCNC: 10 MG/DL (ref 8.6–10.2)
CHLORIDE SERPL-SCNC: 102 MMOL/L (ref 96–106)
CO2 SERPL-SCNC: 24 MMOL/L (ref 20–29)
CREAT SERPL-MCNC: 1.14 MG/DL (ref 0.76–1.27)
ERYTHROCYTE [DISTWIDTH] IN BLOOD BY AUTOMATED COUNT: 12.7 % (ref 11.6–15.4)
GLUCOSE SERPL-MCNC: 89 MG/DL (ref 65–99)
HCT VFR BLD AUTO: 43.4 % (ref 37.5–51)
HCV RNA SERPL QL NAA+PROBE: NEGATIVE
HGB BLD-MCNC: 14.7 G/DL (ref 13–17.7)
INR PPP: 1 (ref 0.9–1.2)
MCH RBC QN AUTO: 33.3 PG (ref 26.6–33)
MCHC RBC AUTO-ENTMCNC: 33.9 G/DL (ref 31.5–35.7)
MCV RBC AUTO: 98 FL (ref 79–97)
PLATELET # BLD AUTO: 232 X10E3/UL (ref 150–450)
POTASSIUM SERPL-SCNC: 4.9 MMOL/L (ref 3.5–5.2)
PROT SERPL-MCNC: 7.6 G/DL (ref 6–8.5)
PROTHROMBIN TIME: 10.5 SEC (ref 9.1–12)
RBC # BLD AUTO: 4.41 X10E6/UL (ref 4.14–5.8)
SODIUM SERPL-SCNC: 142 MMOL/L (ref 134–144)
WBC # BLD AUTO: 6.1 X10E3/UL (ref 3.4–10.8)

## 2021-06-23 LAB
ECHO AO ASC DIAM: 4.23 CM
ECHO AO ROOT DIAM: 3.62 CM
ECHO AV AREA PEAK VELOCITY: 3.74 CM2
ECHO AV AREA VTI: 4.06 CM2
ECHO AV AREA/BSA PEAK VELOCITY: 1.9 CM2/M2
ECHO AV AREA/BSA VTI: 2.1 CM2/M2
ECHO AV MEAN GRADIENT: 2.9 MMHG
ECHO AV PEAK GRADIENT: 6.15 MMHG
ECHO AV PEAK VELOCITY: 123.97 CM/S
ECHO AV VTI: 24.95 CM
ECHO EST RA PRESSURE: 8 MMHG
ECHO IVC PROX: 2.14 CM
ECHO LA AREA 4C: 22.67 CM2
ECHO LA MAJOR AXIS: 3.48 CM
ECHO LA MINOR AXIS: 1.78 CM
ECHO LA VOL 2C: 93.49 ML (ref 18–58)
ECHO LA VOL 4C: 71.7 ML (ref 18–58)
ECHO LA VOL BP: 92.81 ML (ref 18–58)
ECHO LA VOL/BSA BIPLANE: 47.35 ML/M2 (ref 16–28)
ECHO LA VOLUME INDEX A2C: 47.7 ML/M2 (ref 16–28)
ECHO LA VOLUME INDEX A4C: 36.58 ML/M2 (ref 16–28)
ECHO LV E' LATERAL VELOCITY: 10.56 CM/S
ECHO LV E' SEPTAL VELOCITY: 6.18 CM/S
ECHO LV EDV A2C: 88.27 ML
ECHO LV EDV A4C: 99.38 ML
ECHO LV EDV BP: 95.93 ML (ref 67–155)
ECHO LV EDV INDEX A4C: 50.7 ML/M2
ECHO LV EDV INDEX BP: 48.9 ML/M2
ECHO LV EDV NDEX A2C: 45 ML/M2
ECHO LV EJECTION FRACTION A2C: 53 PERCENT
ECHO LV EJECTION FRACTION A4C: 67 PERCENT
ECHO LV EJECTION FRACTION BIPLANE: 61.9 PERCENT (ref 55–100)
ECHO LV ESV A2C: 41.55 ML
ECHO LV ESV A4C: 32.93 ML
ECHO LV ESV BP: 36.56 ML (ref 22–58)
ECHO LV ESV INDEX A2C: 21.2 ML/M2
ECHO LV ESV INDEX A4C: 16.8 ML/M2
ECHO LV ESV INDEX BP: 18.7 ML/M2
ECHO LV INTERNAL DIMENSION DIASTOLIC: 4.64 CM (ref 4.2–5.9)
ECHO LV INTERNAL DIMENSION SYSTOLIC: 2.99 CM
ECHO LV IVSD: 0.99 CM (ref 0.6–1)
ECHO LV MASS 2D: 158.9 G (ref 88–224)
ECHO LV MASS INDEX 2D: 81.1 G/M2 (ref 49–115)
ECHO LV POSTERIOR WALL DIASTOLIC: 0.99 CM (ref 0.6–1)
ECHO LVOT DIAM: 2.42 CM
ECHO LVOT PEAK GRADIENT: 4.1 MMHG
ECHO LVOT PEAK VELOCITY: 101.25 CM/S
ECHO LVOT SV: 101.2 ML
ECHO LVOT VTI: 22.1 CM
ECHO MV A VELOCITY: 61.16 CM/S
ECHO MV AREA PHT: 3.5 CM2
ECHO MV E DECELERATION TIME (DT): 216.55 MS
ECHO MV E VELOCITY: 52.25 CM/S
ECHO MV E/A RATIO: 0.85
ECHO MV E/E' LATERAL: 4.95
ECHO MV E/E' RATIO (AVERAGED): 6.7
ECHO MV E/E' SEPTAL: 8.45
ECHO MV PRESSURE HALF TIME (PHT): 62.8 MS
ECHO RIGHT VENTRICULAR SYSTOLIC PRESSURE (RVSP): 29.34 MMHG
ECHO RV TAPSE: 2.54 CM (ref 1.5–2)
ECHO TV REGURGITANT MAX VELOCITY: 230.47 CM/S
ECHO TV REGURGITANT PEAK GRADIENT: 21.34 MMHG
LA VOL DISK BP: 87.21 ML (ref 18–58)
LVOT MG: 2.28 MMHG

## 2021-06-24 ENCOUNTER — TELEPHONE (OUTPATIENT)
Dept: CARDIOLOGY CLINIC | Age: 71
End: 2021-06-24

## 2021-06-24 NOTE — PROGRESS NOTES
Normal LVEF  Enlarged atria  Do not see mobile lesion in RA  Mild dilation of aorta  Continue to follow up   Will need chest CT with contrast if still has chest pain

## 2021-06-24 NOTE — TELEPHONE ENCOUNTER
Verified patient with two types of identifiers. Notified patient of results. Patient denies any continued chest pain. Scheduled 6 month follow up with patient. Patient to call sooner if chest pain returns. Patient verbalized understanding and will call with any other questions.       Future Appointments   Date Time Provider Adriano Munguia   9/21/2021  9:45 AM Nilsa Amador., RUY MENDOZA BS AMB   12/9/2021 10:20 AM MD MACARIO Sung BS AMB

## 2021-06-24 NOTE — TELEPHONE ENCOUNTER
----- Message from Nettie Bland MD sent at 6/24/2021  7:12 AM EDT -----  Normal LVEF  Enlarged atria  Do not see mobile lesion in RA  Mild dilation of aorta  Continue to follow up   Will need chest CT with contrast if still has chest pain

## 2021-09-20 NOTE — PROGRESS NOTES
[address] [place] [address], Katarzyna KUNZ Pearlean Berkshire, MD Brice Rough, PA-C Juanita Mola, [facility]     Michelle Maxwell, [facility]   Corona Hart FNP-C    Nilda Bella, [facility]       Betito Deputado Karri De Martinez 136    at [address], [address] [place] [address], [facility]. [phone]    FAX: [address] [place] [address], [address]    [phone]    FAX: [phone]     Patient Care Team:  Mike Hays MD as PCP - General (Family Medicine)  Mike Hays MD as PCP - [facility]  Danny Hardin MD (Psychiatry)  Nicole Brantley MD (Gastroenterology)    Problem List  Date Reviewed: 5/6/2021        Codes Class Noted    Hepatitis C antibody positive in blood ICD-10-CM: R76.8  ICD-9-CM: 795.79  9/21/2021    Overview Signed 9/21/2021  9:42 AM by Jeevan Matters., NP     Pt has been treated and cured of active HCV. This test will always be positive. Hepatitis C virus infection cured after antiviral drug therapy ICD-10-CM: Z86.19  ICD-9-CM: V12.09  9/21/2021    Overview Signed 9/21/2021  9:42 AM by Menominee Matters., NP     Treated with 8 weeks of Mavyret, completed in 9/2020. He has achieved SVR.               H/O gastric ulcer ICD-10-CM: Z87.11  ICD-9-CM: V12.79  2/9/2017        Vitamin D deficiency ICD-10-CM: E55.9  ICD-9-CM: 268.9  5/19/2015        Cervical pain (neck) ICD-10-CM: M54.2  ICD-9-CM: 723.1  3/13/2015        Encounter for monitoring Suboxone maintenance therapy ICD-10-CM: Z51.81, Z79.899  ICD-9-CM: V58.83, V58.69  8/3/2011        H/O partial resection of colon ICD-10-CM: Z90.49  ICD-9-CM: V45.72  8/27/2010    Overview Signed 3/6/2020  8:58 AM by Cheng Baez MD     Due to diverticulitis and colon stricture             Hypertension ICD-10-CM: I10  ICD-9-CM: 401.9  8/27/2010            Greg Duverney. is being seen at [address] for management of chronic HCV. The active problem list, all pertinent past medical history, medications, radiologic findings and laboratory findings related to the liver disorder were reviewed with the patient. The patient is a 73 y.o. Black male who was found to have abnormalities in liver chemistries and subsequently tested positive for chronic HCV in 1990s. He has completed 8 weeks of Mavyret. He completed treatment in September 2020. He has achieved SVR. The patient has the following symptom which is thought to be due to the liver disease: none. The patient is not currently experiencing the following symptoms of liver disease: pain in the right side over the liver, yellowing of the eyes or skin, trouble concentrating or swelling in the abdomen. The patient completes all daily activities without any functional limitations. ASSESSMENT AND PLAN:  Chronic HCV   Chronic HCV with no fibrosis. Liver transaminases are normal. ALP is normal. Liver function is normal. The platelet count is normal.      Have performed laboratory testing to monitor liver function and degree of liver injury. This included BMP, hepatic panel and CBC with platelet count. Chronic HCV treatment  The patient has previously been treated with peg interferon. He has completed 8 weeks of Turner Cushing in 9/2020. He has achieved SVR. Will recheck final viral load today. Screening for hepatocellular carcinoma  HCC screening is not necessary if the patient has no evidence of cirrhosis. Treatment of other medical problems in patients with chronic liver disease  There are no contraindications for the patient to take most medications necessary for treatment of other medical issues.     Counseling for alcohol in patients with chronic liver disease  The patient was counseled regarding alcohol consumption and the effect of alcohol on chronic liver disease. The patient does not consume any significant amount of alcohol. Vaccinations   Vaccination for viral hepatitis A and B is not needed. The patient has serologic evidence of prior exposure or vaccination with immunity. Routine vaccinations against other bacterial and viral agents can be performed as indicated. Annual flu vaccination should be administered if indicated. ALLERGIES  No Known Allergies    MEDICATIONS  Current Outpatient Medications   Medication Sig    dilTIAZem ER (CARDIZEM CD) 120 mg capsule TAKE 1 CAPSULE BY MOUTH EVERY DAY    fluticasone propionate (FLONASE) 50 mcg/actuation nasal spray SPRAY 2 SPRAYS INTO EACH NOSTRIL EVERY DAY AS NEEDED    buprenorphine-naloxone (SUBOXONE) 8-2 mg Subl 2 Tabs by SubLINGual route daily. (Patient taking differently: 1.5 Tabs by SubLINGual route daily.)    multivitamin (ONE A DAY) tablet Take 1 Tab by mouth daily.  diclofenac EC (VOLTAREN) 75 mg EC tablet  (Patient not taking: Reported on 2021)     No current facility-administered medications for this visit. FAMILY HISTORY:  The father  of ESRD. The mother is alive at age 80 years. He has the following chronic disease(s): dementia. There is no family history of liver disease. SOCIAL HISTORY:  The patient is . The patient has 5 children and a lot of grandchildren. The patient stopped using tobacco products in . The patient consumes 1 alcoholic beverages per day   The patient currently works full time for [employer] in [employer]. PHYSICAL EXAMINATION:  Visit Vitals  /75 (BP 1 Location: Left arm, BP Patient Position: Sitting, BP Cuff Size: Adult)   Pulse 76   Temp (!) 96.5 °F (35.8 °C) (Temporal)   Resp 14   Ht 5' 10\" (1.778 m)   Wt 172 lb 6.4 oz (78.2 kg)   SpO2 97%   BMI 24.74 kg/m²     General: No acute distress. Eyes: Sclera anicteric. ENT: No oral lesions. Nodes: No adenopathy. Skin: No spider angiomata. No jaundice. No palmar erythema. Respiratory: Lungs clear to auscultation. Cardiovascular: Regular heart rate. No murmurs. No JVD. Abdomen: Soft non-tender. Liver size normal to percussion/palpation. Spleen not palpable. No obvious ascites. Extremities: No edema. No muscle wasting. No gross arthritic changes. Neurologic: Alert and oriented. Cranial nerves grossly intact. No asterixis. LABORATORY STUDIES:  Liver [place] of [address] Units 6/21/2021 1/7/2021   WBC 3.4 - 10.8 x10E3/uL 6.1 4.9   ANC 1.4 - 7.0 x10E3/uL  2.6   HGB 13.0 - 17.7 g/dL 14.7 15.1    - 450 x10E3/uL 232 234   INR 0.9 - 1.2 1.0    AST 0 - 40 IU/L 28    ALT 0 - 44 IU/L 11    Alk Phos 48 - 121 IU/L 64    Bili, Total 0.0 - 1.2 mg/dL 0.7    Bili, Direct 0.00 - 0.40 mg/dL 0.25    Albumin 3.8 - 4.8 g/dL 4.9 (H)    BUN 8 - 27 mg/dL 22 10   Creat 0.76 - 1.27 mg/dL 1.14 0.93   Na 134 - 144 mmol/L 142 140   K 3.5 - 5.2 mmol/L 4.9 4.1   Cl 96 - 106 mmol/L 102 99   CO2 20 - 29 mmol/L 24 29   Glucose 65 - 99 mg/dL 89 76     Liver [place] of [address] [place] [address] Ref Rng & Units 12/21/2020   WBC 3.4 - 10.8 x10E3/uL 5.8   ANC 1.4 - 7.0 x10E3/uL    HGB 13.0 - 17.7 g/dL 14.7    - 450 x10E3/uL 231   INR 0.9 - 1.2    AST 0 - 40 IU/L 29   ALT 0 - 44 IU/L 11   Alk Phos 48 - 121 IU/L 53   Bili, Total 0.0 - 1.2 mg/dL 0.4   Bili, Direct 0.00 - 0.40 mg/dL 0.12   Albumin 3.8 - 4.8 g/dL 4.7   BUN 8 - 27 mg/dL 14   Creat 0.76 - 1.27 mg/dL 1.20   Na 134 - 144 mmol/L 143   K 3.5 - 5.2 mmol/L 5.0   Cl 96 - 106 mmol/L 102   CO2 20 - 29 mmol/L 25   Glucose 65 - 99 mg/dL 89     SEROLOGIES:  Virology Latest Ref Rng & Units 6/21/2021 12/21/2020 8/17/2020   HCV RNA, JONATHAN, QL Negative Negative Negative Negative     Virology Latest Ref Rng & Units 3/6/2020   HCV RNA, JONATHAN, QL Negative Positive (A)     LIVER HISTOLOGY:  7/2020.  FibroScan performed at [address] [address] of [place]. EkPa was 4.4. IQR/med 5%. . The results suggested a fibrosis level of F0. The CAP score suggests no evidence of fatty liver. ENDOSCOPIC PROCEDURES:  Not available or performed    RADIOLOGY:  3/2020. Ultrasound of liver. Echogenic consistent with fatty liver. No liver mass lesions. No dilated bile ducts. No ascites. OTHER TESTING:  Not available or performed    FOLLOW-UP:  All of the issues listed above in the assessment and plan were discussed with the patient. All questions were answered. The patient expressed a clear understanding of the above. Return to [facility] [place]. I will send him a letter once I get results for his records.      Crista Cardoza, [facility]  Liver [place] [facility] [address] [facility], [address] Jayy Siegel  22.  [phone]

## 2021-09-21 ENCOUNTER — OFFICE VISIT (OUTPATIENT)
Dept: HEMATOLOGY | Age: 71
End: 2021-09-21
Payer: COMMERCIAL

## 2021-09-21 VITALS
RESPIRATION RATE: 14 BRPM | WEIGHT: 172.4 LBS | OXYGEN SATURATION: 97 % | HEIGHT: 70 IN | DIASTOLIC BLOOD PRESSURE: 75 MMHG | HEART RATE: 76 BPM | BODY MASS INDEX: 24.68 KG/M2 | SYSTOLIC BLOOD PRESSURE: 127 MMHG | TEMPERATURE: 96.5 F

## 2021-09-21 DIAGNOSIS — R76.8 HEPATITIS C ANTIBODY POSITIVE IN BLOOD: ICD-10-CM

## 2021-09-21 DIAGNOSIS — Z86.19 HEPATITIS C VIRUS INFECTION CURED AFTER ANTIVIRAL DRUG THERAPY: Primary | ICD-10-CM

## 2021-09-21 LAB
ERYTHROCYTE [DISTWIDTH] IN BLOOD BY AUTOMATED COUNT: 12.4 % (ref 11.6–15.4)
HCT VFR BLD AUTO: 41.3 % (ref 37.5–51)
HGB BLD-MCNC: 13.8 G/DL (ref 13–17.7)
MCH RBC QN AUTO: 32.7 PG (ref 26.6–33)
MCHC RBC AUTO-ENTMCNC: 33.4 G/DL (ref 31.5–35.7)
MCV RBC AUTO: 98 FL (ref 79–97)
PLATELET # BLD AUTO: 249 X10E3/UL (ref 150–450)
RBC # BLD AUTO: 4.22 X10E6/UL (ref 4.14–5.8)
WBC # BLD AUTO: 4.8 X10E3/UL (ref 3.4–10.8)

## 2021-09-21 PROCEDURE — 99213 OFFICE O/P EST LOW 20 MIN: CPT | Performed by: NURSE PRACTITIONER

## 2021-09-21 NOTE — PROGRESS NOTES
Chief Complaint   Patient presents with    Hepatitis C     follow up     1. Have you been to the ER, urgent care clinic since your last visit? Hospitalized since your last visit? No    2. Have you seen or consulted any other health care providers outside of the [address] [place] [address] since your last visit? Include any pap smears or colon screening.  No     Visit Vitals  /75 (BP 1 Location: Left arm, BP Patient Position: Sitting, BP Cuff Size: Adult)   Pulse 76   Temp (!) 96.5 °F (35.8 °C) (Temporal)   Resp 14   Ht 5' 10\" (1.778 m)   Wt 172 lb 6.4 oz (78.2 kg)   SpO2 97%   BMI 24.74 kg/m²

## 2021-09-22 LAB
ALBUMIN SERPL-MCNC: 4.8 G/DL (ref 3.8–4.8)
ALP SERPL-CCNC: 53 IU/L (ref 44–121)
ALT SERPL-CCNC: 11 IU/L (ref 0–44)
AST SERPL-CCNC: 24 IU/L (ref 0–40)
BILIRUB DIRECT SERPL-MCNC: 0.25 MG/DL (ref 0–0.4)
BILIRUB SERPL-MCNC: 0.8 MG/DL (ref 0–1.2)
BUN SERPL-MCNC: 17 MG/DL (ref 8–27)
BUN/CREAT SERPL: 16 (ref 10–24)
CALCIUM SERPL-MCNC: 9.7 MG/DL (ref 8.6–10.2)
CHLORIDE SERPL-SCNC: 103 MMOL/L (ref 96–106)
CO2 SERPL-SCNC: 24 MMOL/L (ref 20–29)
CREAT SERPL-MCNC: 1.06 MG/DL (ref 0.76–1.27)
GLUCOSE SERPL-MCNC: 81 MG/DL (ref 65–99)
POTASSIUM SERPL-SCNC: 4.9 MMOL/L (ref 3.5–5.2)
PROT SERPL-MCNC: 7.3 G/DL (ref 6–8.5)
SODIUM SERPL-SCNC: 142 MMOL/L (ref 134–144)

## 2021-09-23 ENCOUNTER — TELEPHONE (OUTPATIENT)
Dept: HEMATOLOGY | Age: 71
End: 2021-09-23

## 2021-09-23 LAB — HCV RNA SERPL QL NAA+PROBE: NEGATIVE

## 2021-11-03 ENCOUNTER — OFFICE VISIT (OUTPATIENT)
Dept: PRIMARY CARE CLINIC | Age: 71
End: 2021-11-03
Payer: COMMERCIAL

## 2021-11-03 VITALS
OXYGEN SATURATION: 98 % | TEMPERATURE: 98.5 F | SYSTOLIC BLOOD PRESSURE: 150 MMHG | WEIGHT: 178.8 LBS | RESPIRATION RATE: 18 BRPM | BODY MASS INDEX: 25.6 KG/M2 | HEART RATE: 80 BPM | HEIGHT: 70 IN | DIASTOLIC BLOOD PRESSURE: 100 MMHG

## 2021-11-03 DIAGNOSIS — B18.2 CHRONIC HEPATITIS C WITHOUT HEPATIC COMA (HCC): ICD-10-CM

## 2021-11-03 DIAGNOSIS — I10 ESSENTIAL HYPERTENSION: ICD-10-CM

## 2021-11-03 DIAGNOSIS — Z00.00 WELL ADULT ON ROUTINE HEALTH CHECK: Primary | ICD-10-CM

## 2021-11-03 PROCEDURE — 99213 OFFICE O/P EST LOW 20 MIN: CPT | Performed by: FAMILY MEDICINE

## 2021-11-03 PROCEDURE — 99397 PER PM REEVAL EST PAT 65+ YR: CPT | Performed by: FAMILY MEDICINE

## 2021-11-03 RX ORDER — LISINOPRIL 10 MG/1
10 TABLET ORAL DAILY
Qty: 90 TABLET | Refills: 0 | Status: SHIPPED | OUTPATIENT
Start: 2021-11-03 | End: 2022-01-07 | Stop reason: SDUPTHER

## 2021-11-03 RX ORDER — DILTIAZEM HYDROCHLORIDE 120 MG/1
120 CAPSULE, COATED, EXTENDED RELEASE ORAL DAILY
Qty: 90 CAPSULE | Refills: 0 | Status: SHIPPED | OUTPATIENT
Start: 2021-11-03 | End: 2021-12-02

## 2021-11-03 NOTE — PROGRESS NOTES
Chief Complaint   Patient presents with    Physical       Health Maintenance Due   Topic    Shingrix Vaccine Age 50> (1 of 2)        1. Have you been to the ER, urgent care clinic since your last visit? Hospitalized since your last visit? No    2. Have you seen or consulted any other health care providers outside of the [address] [place] [address] since your last visit? Include any pap smears or colon screening.  No    Visit Vitals  BP (!) 142/95 (BP 1 Location: Left upper arm, BP Patient Position: Sitting, BP Cuff Size: Adult)   Pulse 80   Temp 98.5 °F (36.9 °C) (Temporal)   Resp 18   Ht 5' 10\" (1.778 m)   Wt 178 lb 12.8 oz (81.1 kg)   SpO2 98%   BMI 25.66 kg/m²

## 2021-11-03 NOTE — PROGRESS NOTES
Subjective:   Agustina Em. is a 73 y.o. male presenting for his annual checkup as well as follow up on blood pressure. HTN: He has been compliant with Diltiazem 120 mg. BP has been fluctuating.  He is compliant with med. No cardiac hx. Denies any CP, soa, cough.     He finished  Hep C Rx  recently and his Hep C titre is negative.       Has been following up with psychiatrist for Suboxone Rx.    ROS:  Feeling well. No dyspnea or chest pain on exertion. No abdominal pain, change in bowel habits, black or bloody stools. No urinary tract or prostatic symptoms. No neurological complaints. Still have pain in his left foot. Bunionectomy did not help. Specific concerns today: refer to HPI. Patient Active Problem List   Diagnosis Code    H/O partial resection of colon Z90.49    Hypertension I10    Encounter for monitoring Suboxone maintenance therapy Z51.81, Z79.899    Cervical pain (neck) M54.2    Vitamin D deficiency E55.9    H/O gastric ulcer Z87.11    Hepatitis C antibody positive in blood R76.8    Hepatitis C virus infection cured after antiviral drug therapy Z86.19     Current Outpatient Medications   Medication Sig Dispense Refill    lisinopriL (PRINIVIL, ZESTRIL) 10 mg tablet Take 1 Tablet by mouth daily. 90 Tablet 0    dilTIAZem ER (CARDIZEM CD) 120 mg capsule TAKE 1 CAPSULE BY MOUTH EVERY DAY 90 Capsule 0    diclofenac EC (VOLTAREN) 75 mg EC tablet       fluticasone propionate (FLONASE) 50 mcg/actuation nasal spray SPRAY 2 SPRAYS INTO EACH NOSTRIL EVERY DAY AS NEEDED 1 Bottle 2    buprenorphine-naloxone (SUBOXONE) 8-2 mg Subl 2 Tabs by SubLINGual route daily. (Patient taking differently: 1.5 Tabs by SubLINGual route daily.) 60 Tab 2    multivitamin (ONE A DAY) tablet Take 1 Tab by mouth daily.        No Known Allergies  Past Medical History:   Diagnosis Date    Hypertension     PUD (peptic ulcer disease)     Seizures ([facility] [address]     as a child-none since ~1976     Social History Tobacco Use    Smoking status: Former Smoker    Smokeless tobacco: Never Used    Tobacco comment: quit May 2010   Substance Use Topics    Alcohol use: Yes     Alcohol/week: 0.8 standard drinks     Types: 1 Cans of beer per week     Comment: occasional beer. Lab Results   Component Value Date/Time    WBC 4.8 09/21/2021 10:27 AM    HGB 13.8 09/21/2021 10:27 AM    HCT 41.3 09/21/2021 10:27 AM    PLATELET 221 07/91/4158 10:27 AM    MCV 98 (H) 09/21/2021 10:27 AM     Lab Results   Component Value Date/Time    Cholesterol, total 197 08/25/2020 12:00 AM    HDL Cholesterol 96 08/25/2020 12:00 AM    LDL, calculated 90 08/25/2020 12:00 AM    Triglyceride 53 08/25/2020 12:00 AM     Lab Results   Component Value Date/Time    ALT (SGPT) 11 09/21/2021 10:27 AM    Alk.  phosphatase 53 09/21/2021 10:27 AM    Bilirubin, direct 0.25 09/21/2021 10:27 AM    Bilirubin, total 0.8 09/21/2021 10:27 AM    Albumin 4.8 09/21/2021 10:27 AM    Protein, total 7.3 09/21/2021 10:27 AM    INR 1.0 06/21/2021 12:00 AM    Prothrombin time 10.5 06/21/2021 12:00 AM    PLATELET 337 67/39/9956 10:27 AM     Lab Results   Component Value Date/Time    GFR est non-AA 71 09/21/2021 10:27 AM    GFRNA, POC >60 07/08/2010 06:57 PM    GFR est AA 82 09/21/2021 10:27 AM    GFRAA, POC >60 07/08/2010 06:57 PM    Creatinine 1.06 09/21/2021 10:27 AM    Creatinine (POC) 0.9 07/08/2010 06:57 PM    BUN 17 09/21/2021 10:27 AM    Sodium 142 09/21/2021 10:27 AM    Potassium 4.9 09/21/2021 10:27 AM    Chloride 103 09/21/2021 10:27 AM    CO2 24 09/21/2021 10:27 AM     Lab Results   Component Value Date/Time    TSH 3.560 08/25/2020 12:00 AM    TSH 1.430 06/19/2018 09:37 AM    T4, Free 1.25 06/19/2018 09:37 AM      Lab Results   Component Value Date/Time    Hemoglobin A1c 5.4 08/25/2020 12:00 AM         Objective:     Visit Vitals  BP (!) 150/100 (BP 1 Location: Left upper arm, BP Patient Position: Sitting)   Pulse 80   Temp 98.5 °F (36.9 °C) (Temporal)   Resp 18 Ht 5' 10\" (1.778 m)   Wt 178 lb 12.8 oz (81.1 kg)   SpO2 98%   BMI 25.66 kg/m²     The patient appears well, alert, oriented x 3, in no distress. ENT normal.  Neck supple. No adenopathy or thyromegaly. SHAW. Lungs are clear, good air entry, no wheezes, rhonchi or rales. S1 and S2 normal, no murmurs, regular rate and rhythm. Abdomen is soft without tenderness, guarding, mass or organomegaly.  exam: deferred. Extremities show no edema, normal peripheral pulses. Neurological is normal without focal findings. Assessment/Plan:   healthy adult male  increase physical activity, follow low fat diet, follow low salt diet, routine labs ordered, call if any problems. ICD-10-CM ICD-9-CM    1. Well adult on routine health check  Z00.00 V70.0 LIPID PANEL      PSA W/ REFLX FREE PSA      HEMOGLOBIN A1C WITH EAG      THYROID CASCADE PROFILE      CBC WITH AUTOMATED DIFF      LIPID PANEL      PSA W/ REFLX FREE PSA      HEMOGLOBIN A1C WITH EAG      THYROID CASCADE PROFILE      CBC WITH AUTOMATED DIFF   2. Essential hypertension  C62 142.8 METABOLIC PANEL, COMPREHENSIVE      METABOLIC PANEL, COMPREHENSIVE      lisinopriL (PRINIVIL, ZESTRIL) 10 mg tablet      dilTIAZem ER (CARDIZEM CD) 120 mg capsule   3. Chronic hepatitis C without hepatic coma (HCC)  B18.2 070.54      Diagnoses and all orders for this visit:    1. Well adult on routine health check  -     LIPID PANEL; Future  -     PSA W/ REFLX FREE PSA; Future  -     HEMOGLOBIN A1C WITH EAG; Future  -     THYROID CASCADE PROFILE; Future  -     CBC WITH AUTOMATED DIFF; Future    2. Essential hypertension  -     METABOLIC PANEL, COMPREHENSIVE; Future  -     Start lisinopriL (PRINIVIL, ZESTRIL) 10 mg tablet; Take 1 Tablet by mouth daily. -     Continue dilTIAZem ER (CARDIZEM CD) 120 mg capsule; Take 1 Capsule by mouth daily.     3. Chronic hepatitis C without hepatic coma ([facility] [address]      Follow-up and Dispositions    · Return in about 2 months (around 1/3/2022), or if symptoms worsen or fail to improve, for Bring BP log book. .       the following changes in treatment are made: start Lisinopril.  reviewed diet, exercise and weight control  reviewed medications and side effects in detail.

## 2021-11-04 LAB
ALBUMIN SERPL-MCNC: 4.7 G/DL (ref 3.8–4.8)
ALBUMIN/GLOB SERPL: 2 {RATIO} (ref 1.2–2.2)
ALP SERPL-CCNC: 53 IU/L (ref 44–121)
ALT SERPL-CCNC: 9 IU/L (ref 0–44)
AST SERPL-CCNC: 24 IU/L (ref 0–40)
BASOPHILS # BLD AUTO: 0.1 X10E3/UL (ref 0–0.2)
BASOPHILS NFR BLD AUTO: 1 %
BILIRUB SERPL-MCNC: 0.5 MG/DL (ref 0–1.2)
BUN SERPL-MCNC: 14 MG/DL (ref 8–27)
BUN/CREAT SERPL: 13 (ref 10–24)
CALCIUM SERPL-MCNC: 9.8 MG/DL (ref 8.6–10.2)
CHLORIDE SERPL-SCNC: 101 MMOL/L (ref 96–106)
CHOLEST SERPL-MCNC: 255 MG/DL (ref 100–199)
CO2 SERPL-SCNC: 23 MMOL/L (ref 20–29)
CREAT SERPL-MCNC: 1.04 MG/DL (ref 0.76–1.27)
EOSINOPHIL # BLD AUTO: 0.5 X10E3/UL (ref 0–0.4)
EOSINOPHIL NFR BLD AUTO: 9 %
ERYTHROCYTE [DISTWIDTH] IN BLOOD BY AUTOMATED COUNT: 12.4 % (ref 11.6–15.4)
EST. AVERAGE GLUCOSE BLD GHB EST-MCNC: 108 MG/DL
GLOBULIN SER CALC-MCNC: 2.4 G/DL (ref 1.5–4.5)
GLUCOSE SERPL-MCNC: 97 MG/DL (ref 65–99)
HBA1C MFR BLD: 5.4 % (ref 4.8–5.6)
HCT VFR BLD AUTO: 40.8 % (ref 37.5–51)
HDLC SERPL-MCNC: 147 MG/DL
HGB BLD-MCNC: 14 G/DL (ref 13–17.7)
IMM GRANULOCYTES # BLD AUTO: 0 X10E3/UL (ref 0–0.1)
IMM GRANULOCYTES NFR BLD AUTO: 0 %
LDLC SERPL CALC-MCNC: 100 MG/DL (ref 0–99)
LYMPHOCYTES # BLD AUTO: 2 X10E3/UL (ref 0.7–3.1)
LYMPHOCYTES NFR BLD AUTO: 35 %
MCH RBC QN AUTO: 33.7 PG (ref 26.6–33)
MCHC RBC AUTO-ENTMCNC: 34.3 G/DL (ref 31.5–35.7)
MCV RBC AUTO: 98 FL (ref 79–97)
MONOCYTES # BLD AUTO: 0.7 X10E3/UL (ref 0.1–0.9)
MONOCYTES NFR BLD AUTO: 13 %
NEUTROPHILS # BLD AUTO: 2.4 X10E3/UL (ref 1.4–7)
NEUTROPHILS NFR BLD AUTO: 42 %
PLATELET # BLD AUTO: 220 X10E3/UL (ref 150–450)
POTASSIUM SERPL-SCNC: 4.6 MMOL/L (ref 3.5–5.2)
PROT SERPL-MCNC: 7.1 G/DL (ref 6–8.5)
PSA SERPL-MCNC: 0.2 NG/ML (ref 0–4)
RBC # BLD AUTO: 4.16 X10E6/UL (ref 4.14–5.8)
REFLEX CRITERIA: NORMAL
SODIUM SERPL-SCNC: 142 MMOL/L (ref 134–144)
TRIGL SERPL-MCNC: 50 MG/DL (ref 0–149)
TSH SERPL DL<=0.005 MIU/L-ACNC: 1.8 UIU/ML (ref 0.45–4.5)
VLDLC SERPL CALC-MCNC: 8 MG/DL (ref 5–40)
WBC # BLD AUTO: 5.6 X10E3/UL (ref 3.4–10.8)

## 2021-11-05 NOTE — PROGRESS NOTES
Sent via my chart. Mr. Patricia Espinal,    Your lab results looks good. No concerning finding noted. Keep up the good work.     Dr. Kelsie Clayton

## 2021-12-02 DIAGNOSIS — I10 ESSENTIAL HYPERTENSION: ICD-10-CM

## 2021-12-02 RX ORDER — DILTIAZEM HYDROCHLORIDE 120 MG/1
CAPSULE, COATED, EXTENDED RELEASE ORAL
Qty: 90 CAPSULE | Refills: 0 | Status: SHIPPED | OUTPATIENT
Start: 2021-12-02 | End: 2022-01-07 | Stop reason: SDUPTHER

## 2021-12-09 ENCOUNTER — OFFICE VISIT (OUTPATIENT)
Dept: CARDIOLOGY CLINIC | Age: 71
End: 2021-12-09
Payer: COMMERCIAL

## 2021-12-09 VITALS
DIASTOLIC BLOOD PRESSURE: 80 MMHG | HEART RATE: 76 BPM | SYSTOLIC BLOOD PRESSURE: 122 MMHG | OXYGEN SATURATION: 98 % | WEIGHT: 176.6 LBS | RESPIRATION RATE: 12 BRPM | HEIGHT: 70 IN | BODY MASS INDEX: 25.28 KG/M2

## 2021-12-09 DIAGNOSIS — I10 ESSENTIAL HYPERTENSION: Primary | ICD-10-CM

## 2021-12-09 DIAGNOSIS — I51.7 RIGHT HEART ENLARGEMENT: ICD-10-CM

## 2021-12-09 DIAGNOSIS — I51.7 BIATRIAL ENLARGEMENT: ICD-10-CM

## 2021-12-09 DIAGNOSIS — G47.33 OSA (OBSTRUCTIVE SLEEP APNEA): ICD-10-CM

## 2021-12-09 PROCEDURE — 99214 OFFICE O/P EST MOD 30 MIN: CPT | Performed by: INTERNAL MEDICINE

## 2021-12-09 RX ORDER — DICLOFENAC SODIUM 10 MG/G
GEL TOPICAL AS NEEDED
COMMUNITY
Start: 2021-11-09 | End: 2022-06-14

## 2021-12-09 NOTE — PATIENT INSTRUCTIONS
Follow up with Dr. Radha Barton in 6 months. A referral has been sent to Dr. Clarissa Torres, pulmonologist.  If you are not contacted within 7 days, please call her office at [phone].

## 2021-12-09 NOTE — PROGRESS NOTES
Cardiac Electrophysiology OFFICE Note     Subjective:      Rosalind Mckeon. is a 73 y.o. patient who is seen for evaluation/management of abnormal ECG. He was kindly referred by Dr. Kervin Scott. ECG had been done in 01/2021 for properative clearance (bunionectomy). ECG showed NSR with incomplete RBBB & 1st degree AVB. Review of prior ECG in 2017 also showed incomplete RBBB, but 1st degree AVB wasn't present then. He reports occasional chest heaviness, not correlated with exertion. Occasional mild SOB, but inconsistent. Also with occasional inconsistent lightheadedness. No syncope. Occasionally \"hears\" his heart beating, but denies rapid palpitations. Stress echo was negative for ischemia (6/7/2021)  2 D echo with RV and biatrial enlargement  He was referred to sleep study but did not do       ECHO ADULT COMPLETE 06/23/2021 6/23/2021    Interpretation Summary  · LV: Calculated LVEF is 62%. Biplane method used to measure ejection fraction. Normal cavity size and systolic function (ejection fraction normal). Upper normal wall thickness. Wall motion: normal. Mild (grade 1) left ventricular diastolic dysfunction. · LA: Moderately dilated left atrium. · RV: Mildly dilated right ventricle. · RA: Severely dilated right atrium. · MV: Mitral valve thickening. Mild mitral valve regurgitation is present. · TV: Mild tricuspid valve regurgitation is present. Right Ventricular Arterial Pressure (RVSP) is 29 mmHg. Pulmonary hypertension not suggested by Doppler findings. · PV: Mild pulmonic valve regurgitation is present. · AO: Mild ascending aorta dilatation. Ascending aorta diameter = 4.2 cm. Diastolic BP borderline high but recheck was normal       Previous:  Evaluated for chest discomfort in 2014, had normal stress echo. Echo 2014 with normal LVEF and mild MR TR    Father had heart problems (specifics unknown) in his 50s-60s, had multiple hospitalizations.   Other family members on paternal side with heart disease. Problem List  Date Reviewed: 12/9/2021          Codes Class Noted    Hepatitis C antibody positive in blood ICD-10-CM: R76.8  ICD-9-CM: 795.79  9/21/2021    Overview Signed 9/21/2021  9:42 AM by Nilsa García, NP     Pt has been treated and cured of active HCV. This test will always be positive. Hepatitis C virus infection cured after antiviral drug therapy ICD-10-CM: Z86.19  ICD-9-CM: V12.09  9/21/2021    Overview Signed 9/21/2021  9:42 AM by Nilsa Fish., NP     Treated with 8 weeks of Mavyret, completed in 9/2020. He has achieved SVR. H/O gastric ulcer ICD-10-CM: Z87.11  ICD-9-CM: V12.79  2/9/2017        Vitamin D deficiency ICD-10-CM: E55.9  ICD-9-CM: 268.9  5/19/2015        Cervical pain (neck) ICD-10-CM: M54.2  ICD-9-CM: 723.1  3/13/2015        Encounter for monitoring Suboxone maintenance therapy ICD-10-CM: Z51.81, Z79.899  ICD-9-CM: V58.83, V58.69  8/3/2011        H/O partial resection of colon ICD-10-CM: Z90.49  ICD-9-CM: V45.72  8/27/2010    Overview Signed 3/6/2020  8:58 AM by Devan Johnson MD     Due to diverticulitis and colon stricture             Hypertension ICD-10-CM: I10  ICD-9-CM: 401.9  8/27/2010              Current Outpatient Medications   Medication Sig Dispense Refill    diclofenac (VOLTAREN) 1 % gel as needed.  dilTIAZem ER (CARDIZEM CD) 120 mg capsule TAKE 1 CAPSULE BY MOUTH EVERY DAY 90 Capsule 0    lisinopriL (PRINIVIL, ZESTRIL) 10 mg tablet Take 1 Tablet by mouth daily. 90 Tablet 0    diclofenac EC (VOLTAREN) 75 mg EC tablet Take 75 mg by mouth daily.  fluticasone propionate (FLONASE) 50 mcg/actuation nasal spray SPRAY 2 SPRAYS INTO EACH NOSTRIL EVERY DAY AS NEEDED 1 Bottle 2    buprenorphine-naloxone (SUBOXONE) 8-2 mg Subl 2 Tabs by SubLINGual route daily. (Patient taking differently: 1.5 Tabs by SubLINGual route daily.) 60 Tab 2    multivitamin (ONE A DAY) tablet Take 1 Tab by mouth daily.        No Known Allergies  Past Medical History:   Diagnosis Date    Hypertension     PUD (peptic ulcer disease)     Seizures (HCC)     as a child-none since ~1976     Past Surgical History:   Procedure Laterality Date    HX APPENDECTOMY      HX GI      surgical repair of bleeding ulcer    HX GI      colon resection    HX HEENT      T&A    HX KNEE ARTHROSCOPY  1996    right knee    HX ORTHOPAEDIC  2003 & 2005    left knee     Family History   Problem Relation Age of Onset    Heart Disease Mother     Diabetes Father     Diabetes Sister     Diabetes Paternal Grandmother      Social History     Tobacco Use    Smoking status: Former Smoker    Smokeless tobacco: Never Used    Tobacco comment: quit May 2010   Substance Use Topics    Alcohol use: Yes     Alcohol/week: 0.8 standard drinks     Types: 1 Cans of beer per week     Comment: occasional beer. Review of Systems: Review of all other systems otherwise negative. Constitutional: Negative for fever, chills, weight loss, malaise/fatigue. HEENT: Negative for nosebleeds, vision changes. +snoring  Respiratory: Negative for cough, hemoptysis  Cardiovascular:  no palpitations, orthopnea, claudication, leg swelling, syncope, and PND. Gastrointestinal: Negative for nausea, vomiting, diarrhea, blood in stool and melena. Genitourinary: Negative for dysuria, and hematuria. Musculoskeletal: Negative for myalgias, arthralgia. Skin: Negative for rash. Heme: Does not bleed or bruise easily. Neurological: Negative for speech change and focal weakness. Objective:     Visit Vitals  /80   Pulse 76   Resp 12   Ht 5' 10\" (1.778 m)   Wt 176 lb 9.6 oz (80.1 kg)   SpO2 98%   BMI 25.34 kg/m²      Physical Exam:   Constitutional: Well-developed and well-nourished. No respiratory distress. Head: Normocephalic and atraumatic. Eyes: Pupils are equal, round. ENT: Hearing grossly normal.  Neck: Supple. No JVD present.    Cardiovascular: Normal rate, regular rhythm. Exam reveals no gallop and no friction rub. No murmur heard. Pulmonary/Chest: Effort normal and breath sounds normal. No wheezes. Abdominal: Soft, no tenderness. Musculoskeletal: Moves extremities independently. Normal gait. Vasc/lymphatic: No edema. Neurological: Alert,oriented. Skin: Skin is warm and dry. Psychiatric: Normal mood and affect. Behavior is normal. Judgment and thought content normal.      EKG: NSR with incomplete RBBB, 1st degree AVB. Assessment/Plan:          ICD-10-CM ICD-9-CM    1. Essential hypertension  I10 401.9    2. Right heart enlargement  I51.7 429.3    3. Biatrial enlargement  I51.7 429.3    4. VITALIY (obstructive sleep apnea)  G47.33 327.23         Conduction abnormality: Incomplete RBBB present on 2017 ECG as well. 1st degree AVB new but mild and he can continue with cardizem for now. No syncope. Continue to monitor. HTN: Borderline high diastolic BP today but recheck was normal.  Mild LVH had been noted in 2014. Continue diltiazem  mg po daily and lisinopril for now. Right heart and biatrial enlargement could be related to sleep apnea: refer to sleep study     Mild dilation of aorta  Continue to follow up over time  Will need chest CT with contrast if has chest pain       Follow-up and Dispositions    · Return in about 6 months (around 6/9/2022). Future Appointments   Date Time Provider Adriano Munguia   1/7/2022 11:30 AM Len Johnson [place] [facility] [place] [facility]     Thank you for involving me in this patient's care and please call with further concerns or questions. Enrrique Bennett M.D.   Electrophysiology/Cardiology  [facility]  [address], [address] , [address]  [address]  [phone]

## 2021-12-09 NOTE — PROGRESS NOTES
Chief Complaint   Patient presents with    Follow-up     6 month. Denies chest pain/shortness of breath/dizziness/swelling.      Visit Vitals  BP (!) 130/90 (BP 1 Location: Left upper arm, BP Patient Position: Sitting, BP Cuff Size: Adult)   Pulse 76   Resp 12   Ht 5' 10\" (1.778 m)   Wt 176 lb 9.6 oz (80.1 kg)   SpO2 98%   BMI 25.34 kg/m²

## 2022-01-07 ENCOUNTER — OFFICE VISIT (OUTPATIENT)
Dept: PRIMARY CARE CLINIC | Age: 72
End: 2022-01-07
Payer: COMMERCIAL

## 2022-01-07 VITALS
WEIGHT: 177.6 LBS | BODY MASS INDEX: 25.43 KG/M2 | OXYGEN SATURATION: 98 % | TEMPERATURE: 97.6 F | RESPIRATION RATE: 16 BRPM | HEIGHT: 70 IN | HEART RATE: 60 BPM | DIASTOLIC BLOOD PRESSURE: 76 MMHG | SYSTOLIC BLOOD PRESSURE: 119 MMHG

## 2022-01-07 DIAGNOSIS — I10 ESSENTIAL HYPERTENSION: Primary | ICD-10-CM

## 2022-01-07 DIAGNOSIS — B18.2 CHRONIC HEPATITIS C WITHOUT HEPATIC COMA (HCC): ICD-10-CM

## 2022-01-07 PROCEDURE — 99213 OFFICE O/P EST LOW 20 MIN: CPT | Performed by: FAMILY MEDICINE

## 2022-01-07 RX ORDER — DILTIAZEM HYDROCHLORIDE 120 MG/1
120 CAPSULE, COATED, EXTENDED RELEASE ORAL DAILY
Qty: 90 CAPSULE | Refills: 0 | Status: SHIPPED | OUTPATIENT
Start: 2022-01-07 | End: 2022-06-14 | Stop reason: SDUPTHER

## 2022-01-07 RX ORDER — LISINOPRIL 10 MG/1
10 TABLET ORAL DAILY
Qty: 90 TABLET | Refills: 1 | Status: SHIPPED | OUTPATIENT
Start: 2022-01-07 | End: 2022-06-14 | Stop reason: SDUPTHER

## 2022-01-07 NOTE — PROGRESS NOTES
Subjective:     Chief Complaint   Patient presents with    Hypertension        He  is a 73y.o. year old male who presents for evaluation of HTN. HTN: He has been compliant with Diltiazem 120 mg and Lisinopril. Lisinopril was started about two months ago. No cardiac hx. Denies any CP, soa, cough. He had a follow up with cardiologist last month. No acute concerns. Pertinent items are noted in HPI. Objective:     Vitals:    01/07/22 1138   BP: 119/76   Pulse: 60   Resp: 16   Temp: 97.6 °F (36.4 °C)   TempSrc: Temporal   SpO2: 98%   Weight: 177 lb 9.6 oz (80.6 kg)   Height: 5' 10\" (1.778 m)       Physical Examination: General appearance - alert, well appearing, and in no distress, oriented to person, place, and time and overweight  Mental status - alert, oriented to person, place, and time, normal mood, behavior, speech, dress, motor activity, and thought processes  Chest - clear to auscultation, no wheezes, rales or rhonchi, symmetric air entry  Heart - normal rate, regular rhythm, normal S1, S2, no murmurs, rubs, clicks or gallops    No Known Allergies   Social History     Socioeconomic History    Marital status:    Tobacco Use    Smoking status: Former Smoker    Smokeless tobacco: Never Used    Tobacco comment: quit May 2010   Vaping Use    Vaping Use: Never used   Substance and Sexual Activity    Alcohol use: Yes     Alcohol/week: 0.8 standard drinks     Types: 1 Cans of beer per week     Comment: occasional beer.      Drug use: No     Types: Heroin     Comment: quit 2007    Sexual activity: Not Currently      Family History   Problem Relation Age of Onset    Heart Disease Mother     Diabetes Father     Diabetes Sister     Diabetes Paternal Grandmother       Past Surgical History:   Procedure Laterality Date    HX APPENDECTOMY      HX GI      surgical repair of bleeding ulcer    HX GI      colon resection    HX HEENT      T&A    HX KNEE ARTHROSCOPY  1996    right knee    HX ORTHOPAEDIC  2003 & 2005    left knee      Past Medical History:   Diagnosis Date    Hypertension     PUD (peptic ulcer disease)     [facility]     as a child-none since ~1976      Current Outpatient Medications   Medication Sig Dispense Refill    diclofenac (VOLTAREN) 1 % gel as needed.  dilTIAZem ER (CARDIZEM CD) 120 mg capsule TAKE 1 CAPSULE BY MOUTH EVERY DAY 90 Capsule 0    lisinopriL (PRINIVIL, ZESTRIL) 10 mg tablet Take 1 Tablet by mouth daily. 90 Tablet 0    diclofenac EC (VOLTAREN) 75 mg EC tablet Take 75 mg by mouth daily.  fluticasone propionate (FLONASE) 50 mcg/actuation nasal spray SPRAY 2 SPRAYS INTO EACH NOSTRIL EVERY DAY AS NEEDED 1 Bottle 2    buprenorphine-naloxone (SUBOXONE) 8-2 mg Subl 2 Tabs by SubLINGual route daily. (Patient taking differently: 1.5 Tabs by SubLINGual route daily.) 60 Tab 2    multivitamin (ONE A DAY) tablet Take 1 Tab by mouth daily. Assessment/ Plan:   Diagnoses and all orders for this visit:    1. Essential hypertension  -     lisinopriL (PRINIVIL, ZESTRIL) 10 mg tablet; Take 1 Tablet by mouth daily. -     dilTIAZem ER (CARDIZEM CD) 120 mg capsule; Take 1 Capsule by mouth daily.  -     METABOLIC PANEL, BASIC; Future    2. Chronic hepatitis C without hepatic coma ([facility]       Rx completed. Medication risks/benefits/costs/interactions/alternatives discussed with patient. Advised patient to call back or return to office if symptoms worsen/change/persist. If patient cannot reach us or should anything more severe/urgent arise he/she should proceed directly to the nearest emergency department. Discussed expected course/resolution/complications of diagnosis in detail with patient. Patient given a written after visit summary which includes her diagnoses, current medications and vitals. Patient expressed understanding with the diagnosis and plan.      Follow-up and Dispositions    · Return in about 6 months (around 7/7/2022), or if symptoms worsen or fail to improve, for Hypertension.

## 2022-01-07 NOTE — PROGRESS NOTES
Identified pt with two pt identifiers(name and ). Chief Complaint   Patient presents with    Hypertension        3 most recent PHQ Screens 2022   Little interest or pleasure in doing things Not at all   Feeling down, depressed, irritable, or hopeless Not at all   Total Score PHQ 2 0        Vitals:    22 1138   BP: 119/76   Pulse: 60   Resp: 16   Temp: 97.6 °F (36.4 °C)   TempSrc: Temporal   SpO2: 98%   Weight: 177 lb 9.6 oz (80.6 kg)   Height: 5' 10\" (1.778 m)   PainSc:   0 - No pain       There are no preventive care reminders to display for this patient. 1. Have you been to the ER, urgent care clinic since your last visit? Hospitalized since your last visit? No    2. Have you seen or consulted any other health care providers outside of the [address] [place] [address] since your last visit? Include any pap smears or colon screening.  No

## 2022-01-08 LAB
BUN SERPL-MCNC: 12 MG/DL (ref 8–27)
BUN/CREAT SERPL: 11 (ref 10–24)
CALCIUM SERPL-MCNC: 9.4 MG/DL (ref 8.6–10.2)
CHLORIDE SERPL-SCNC: 101 MMOL/L (ref 96–106)
CO2 SERPL-SCNC: 26 MMOL/L (ref 20–29)
CREAT SERPL-MCNC: 1.09 MG/DL (ref 0.76–1.27)
GLUCOSE SERPL-MCNC: 98 MG/DL (ref 65–99)
POTASSIUM SERPL-SCNC: 5.1 MMOL/L (ref 3.5–5.2)
SODIUM SERPL-SCNC: 141 MMOL/L (ref 134–144)

## 2022-01-10 NOTE — PROGRESS NOTES
Sent via my chart    Hi Mr. Rosina De La Cruz,    Kidney function came back normal.    Take care.     Dr. Mercy Ellis

## 2022-03-18 PROBLEM — Z87.11 H/O GASTRIC ULCER: Status: ACTIVE | Noted: 2017-02-09

## 2022-03-19 PROBLEM — Z86.19 HEPATITIS C VIRUS INFECTION CURED AFTER ANTIVIRAL DRUG THERAPY: Status: ACTIVE | Noted: 2021-09-21

## 2022-03-19 PROBLEM — R76.8 HEPATITIS C ANTIBODY POSITIVE IN BLOOD: Status: ACTIVE | Noted: 2021-09-21

## 2022-04-05 ENCOUNTER — OFFICE VISIT (OUTPATIENT)
Dept: SLEEP MEDICINE | Age: 72
End: 2022-04-05
Payer: COMMERCIAL

## 2022-04-05 VITALS
OXYGEN SATURATION: 97 % | WEIGHT: 178 LBS | BODY MASS INDEX: 25.48 KG/M2 | DIASTOLIC BLOOD PRESSURE: 88 MMHG | TEMPERATURE: 97.9 F | SYSTOLIC BLOOD PRESSURE: 133 MMHG | HEART RATE: 86 BPM | HEIGHT: 70 IN

## 2022-04-05 DIAGNOSIS — I10 PRIMARY HYPERTENSION: ICD-10-CM

## 2022-04-05 DIAGNOSIS — G47.33 OSA (OBSTRUCTIVE SLEEP APNEA): Primary | ICD-10-CM

## 2022-04-05 PROCEDURE — 99204 OFFICE O/P NEW MOD 45 MIN: CPT | Performed by: INTERNAL MEDICINE

## 2022-04-05 NOTE — PROGRESS NOTES
[address] [facility] [address] [place], [address]  Tel.  [phone]  Fax. 100 [facility] 60  [place], [address] [facility]  Tel.  [phone]  Fax. [phone] [address]  Tel.  [phone]  Fax. [phone]       Benjamin Dial is a 81y.o. year old male referred by Yarely Lenz MD for evaluation of a sleep disorder. ASSESSMENT/PLAN:      ICD-10-CM ICD-9-CM    1. VITALIY (obstructive sleep apnea)  G47.33 327.23 POLYSOMNOGRAPHY 1 NIGHT   2. Primary hypertension  I10 401.9    3. BMI 25.0-25.9,adult  Z68.25 V85.21        Patient has a history and examination consistent with the diagnosis of sleep apnea. Follow-up and Dispositions    · Return for telephone follow-up after testing is completed. * The patient currently has a High Risk for having sleep apnea. STOP-BANG score 7.    * Attended Sleep testing was ordered for initial evaluation due to patient being on Suboxone Therapy (Central Apnea Prone) at this time. Orders Placed This Encounter    POLYSOMNOGRAPHY 1 NIGHT     Standing Status:   Future     Standing Expiration Date:   7/5/2022     Order Specific Question:   Reason for Exam     Answer:   VITALIY       * He was provided information on sleep apnea including corresponding risk factors and the importance of proper treatment. * Treatment options were reviewed in detail. he would like to proceed with PAP therapy. Patient will be seen in follow-up in 6-8 weeks after PAP setup to gauge treatment response and adherence to therapy. * The patient was counseled regarding proper sleep hygiene, with emphasis on ensuring sufficient total sleep time; safe driving and the benefits of exercise and weight loss. * All of his questions were addressed.     2. Hypertension -  continue on current regimen, he will continue to monitor his BP and follow up with his primary care provider for reevaluation/adjustment of medications if warranted. I have reviewed the relationship between hypertension as it relates to sleep-disordered breathing. SUBJECTIVE/OBJECTIVE:    Kari Monsivais is an 81 y.o. male referred for evaluation for a sleep disorder. He complains of snoring associated with periods of not breathing, awakening in the middle of the night because of no specific reason. Sleep is not restorative and he feels tired on waking. Symptoms began several years ago, unchanged since that time. He usually can fall asleep in 5 minutes. Family or house members note snoring and periods of not breathing. He denies of symptoms indicative of cataplexy or sleep related hallucinations. He does report of sleep paralysis. He denies of a history of unusual movements occurring during sleep. Kari Monsivais does wake up frequently at night. He is not bothered by waking up too early and left unable to get back to sleep. He actually sleeps about 5 hours at night and wakes up about 3 times during the night. He does not work shifts:  . Eric Kimble indicates he does get too little sleep at night. His bedtime is 0000. He awakens at 0500. He does not take naps. He has the following observed behaviors:  ;  .  Other remarks: The patient has not undergone diagnostic testing for the current problems. Review of Systems:  Constitutional:  No significant weight loss or weight gain  Eyes:  No blurred vision  CVS:  No significant chest pain  Pulm:  No significant shortness of breath  GI:  No significant nausea or vomiting  :  No significant nocturia  Musculoskeletal:  No significant joint pain at night  Skin:  No significant rashes  Neuro:  No significant dizziness   Psych:  No active mood issues    Sleep Review of Systems: notable for Negative difficulty falling asleep; Positive awakenings at night; Positive perceived regular dreaming; Negative nightmares; Positive  early morning headaches; Negative  memory problems;  Negative  concentration issues; Negative caffeine;  Negative alcohol;   Negative history of any automobile or occupational accidents due to daytime drowsiness. [place]ont Sleepiness Score: 9   and Modified F.O.S.Q. Score Total / 2: 17    Visit Vitals  /88   Pulse 86   Temp 97.9 °F (36.6 °C)   Ht 5' 10\" (1.778 m)   Wt 178 lb (80.7 kg)   SpO2 97%   BMI 25.54 kg/m²           General:   Alert, oriented, not in acute distress   Eyes:  Anicteric Sclerae; intact EOM's   Nose:  No obvious nasal septum deviation    Oropharynx:   Mallampati score 3, thick tongue base, uvula not seen due to low-lying soft palate, narrow tonsilo-pharyngeal pilars   Neck:   midline trachea,  no JVD   Chest/Lungs:  symmetrical lung expansion ,clear lung fields on auscultation    CVS:  Normal rate, regular rhythm    Extremities:  No obvious rashes, absent edema    Neuro:  No focal deficits; No obvious tremor    Psych:  Normal eye contact; normal  affect, normal countenance          Carrie Heredia MD, FAASM  Diplomate American Board of Sleep Medicine  Diplomate in Sleep Medicine - ABP    Electronically signed.  04/05/22

## 2022-04-05 NOTE — PATIENT INSTRUCTIONS
217 [facility] [address]. [place], [address]  Tel.  [phone]  Fax. 100 [facility] 60  [place], [address] [facility]  Tel.  [phone]  Fax. [phone] [address]  Tel.  [phone]  Fax. [phone]     Sleep Apnea: After Your Visit  Your Care Instructions  Sleep apnea occurs when you frequently stop breathing for 10 seconds or longer during sleep. It can be mild to severe, based on the number of times per hour that you stop breathing or have slowed breathing. Blocked or narrowed airways in your nose, mouth, or throat can cause sleep apnea. Your airway can become blocked when your throat muscles and tongue relax during sleep. Sleep apnea is common, occurring in 1 out of 20 individuals. Individuals having any of the following characteristics should be evaluated and treated right away due to high risk and detrimental consequences from untreated sleep apnea:  1. Obesity  2. Congestive Heart failure  3. Atrial Fibrillation  4. Uncontrolled Hypertension  5. Type II Diabetes  6. Night-time Arrhythmias  7. Stroke  8. Pulmonary Hypertension  9. High-risk Driving Populations (pilots, truck drivers, etc.)  10. Patients Considering Weight-loss Surgery    How do you know you have sleep apnea? You probably have sleep apnea if you answer 'yes' to 3 or more of the following questions:  S - Have you been told that you Snore? T - Are you often Tired during the day? O - Has anyone Observed you stop breathing while sleeping? P- Do you have (or are being treated for) high blood Pressure? B - Are you obese (Body Mass Index > 35)? A - Is your Age 48years old or older? N - Is your Neck size greater than 16 inches? G - Are you male Gender? A sleep physician can prescribe a breathing device that prevents tissues in the throat from blocking your airway.  Or your doctor may recommend using a dental device (oral breathing device) to help keep your airway open. In some cases, surgery may be needed to remove enlarged tissues in the throat. Follow-up care is a key part of your treatment and safety. Be sure to make and go to all appointments, and call your doctor if you are having problems. It's also a good idea to know your test results and keep a list of the medicines you take. How can you care for yourself at home? · Lose weight, if needed. It may reduce the number of times you stop breathing or have slowed breathing. · Go to bed at the same time every night. · Sleep on your side. It may stop mild apnea. If you tend to roll onto your back, sew a pocket in the back of your pajama top. Put a tennis ball into the pocket, and stitch the pocket shut. This will help keep you from sleeping on your back. · Avoid alcohol and medicines such as sleeping pills and sedatives before bed. · Do not smoke. Smoking can make sleep apnea worse. If you need help quitting, talk to your doctor about stop-smoking programs and medicines. These can increase your chances of quitting for good. · Prop up the head of your bed 4 to 6 inches by putting bricks under the legs of the bed. · Treat breathing problems, such as a stuffy nose, caused by a cold or allergies. · Use a continuous positive airway pressure (CPAP) breathing machine if lifestyle changes do not help your apnea and your doctor recommends it. The machine keeps your airway from closing when you sleep. · If CPAP does not help you, ask your doctor whether you should try other breathing machines. A bilevel positive airway pressure machine has two types of air pressureâone for breathing in and one for breathing out. Another device raises or lowers air pressure as needed while you breathe. · If your nose feels dry or bleeds when using one of these machines, talk with your doctor about increasing moisture in the air. A humidifier may help.   · If your nose is runny or stuffy from using a breathing machine, talk with your doctor about using decongestants or a corticosteroid nasal spray. When should you call for help? Watch closely for changes in your health, and be sure to contact your doctor if:  · You still have sleep apnea even though you have made lifestyle changes. · You are thinking of trying a device such as CPAP. · You are having problems using a CPAP or similar machine. Where can you learn more? Go to Krillion. Enter R165 in the search box to learn more about \"Sleep Apnea: After Your Visit. \"   © 0492-3116 Healthwise, Incorporated. Care instructions adapted under license by [facility] (which disclaims liability or warranty for this information). This care instruction is for use with your licensed healthcare professional. If you have questions about a medical condition or this instruction, always ask your healthcare professional. Kwame Bird any warranty or liability for your use of this information. PROPER SLEEP HYGIENE    What to avoid  · Do not have drinks with caffeine, such as coffee or black tea, for 8 hours before bed. · Do not smoke or use other types of tobacco near bedtime. Nicotine is a stimulant and can keep you awake. · Avoid drinking alcohol late in the evening, because it can cause you to wake in the middle of the night. · Do not eat a big meal close to bedtime. If you are hungry, eat a light snack. · Do not drink a lot of water close to bedtime, because the need to urinate may wake you up during the night. · Do not read or watch TV in bed. Use the bed only for sleeping and sexual activity. What to try  · Go to bed at the same time every night, and wake up at the same time every morning. Do not take naps during the day. · Keep your bedroom quiet, dark, and cool. · Get regular exercise, but not within 3 to 4 hours of your bedtime. .  · Sleep on a comfortable pillow and mattress.   · If watching the clock makes you anxious, turn it facing away from you so you cannot see the time. · If you worry when you lie down, start a worry book. Well before bedtime, write down your worries, and then set the book and your concerns aside. · Try meditation or other relaxation techniques before you go to bed. · If you cannot fall asleep, get up and go to another room until you feel sleepy. Do something relaxing. Repeat your bedtime routine before you go to bed again. · Make your house quiet and calm about an hour before bedtime. Turn down the lights, turn off the TV, log off the computer, and turn down the volume on music. This can help you relax after a busy day. Drowsy Driving  The [address] [place] [address] Road Traffic Safety Administration cites drowsiness as a causing factor in more than 541,369 police reported crashes annually, resulting in 76,000 injuries and 1,500 deaths. Other surveys suggest 55% of people polled have driven while drowsy in the past year, 23% had fallen asleep but not crashed, 3% crashed, and 2% had and accident due to drowsy driving. Who is at risk? Young Drivers: One study of drowsy driving accidents states that 55% of the drivers were under 25 years. Of those, 75% were male. Shift Workers and Travelers: People who work overnight or travel across time zones frequently are at higher risk of experiencing Circadian Rhythm Disorders. They are trying to work and function when their body is programed to sleep. Sleep Deprived: Lack of sleep has a serious impact on your ability to pay attention or focus on a task. Consistently getting less than the average of 8 hours your body needs creates partial or cumulative sleep deprivation. Untreated Sleep Disorders: Sleep Apnea, Narcolepsy, R.L.S., and other sleep disorders (untreated) prevent a person from getting enough restful sleep. This leads to excessive daytime sleepiness and increases the risk for drowsy driving accidents by up to 7 times.   Medications / Alcohol: Even over the counter medications can cause drowsiness. Medications that impair a drivers attention should have a warning label. Alcohol naturally makes you sleepy and on its own can cause accidents. Combined with excessive drowsiness its effects are amplified. Signs of Drowsy Driving:   * You don't remember driving the last few miles   * You may drift out of your jenny   * You are unable to focus and your thoughts wander   * You may yawn more often than normal   * You have difficulty keeping your eyes open / nodding off   * Missing traffic signs, speeding, or tailgating  Prevention-   Good sleep hygiene, lifestyle and behavioral choices have the most impact on drowsy driving. There is no substitute for sleep and the average person requires 8 hours nightly. If you find yourself driving drowsy, stop and sleep. Consider the sleep hygiene tips provided during your visit as well. Medication Refill Policy: Refills for all medications require 1 week advance notice. Please have your pharmacy fax a refill request. We are unable to fax, or call in \"controled substance\" medications and you will need to pick these prescriptions up from our office. WHOOP Activation    Thank you for requesting access to WHOOP. Please follow the instructions below to securely access and download your online medical record. WHOOP allows you to send messages to your doctor, view your test results, renew your prescriptions, schedule appointments, and more. How Do I Sign Up? 1. In your internet browser, go to [email or web address]. 2. Click on the First Time User? Click Here link in the Sign In box. You will see the New Member Sign Up page. 3. Enter your WHOOP Access Code exactly as it appears below. You will not need to use this code after youve completed the sign-up process. If you do not sign up before the expiration date, you must request a new code. WHOOP Access Code:  Activation code not generated  Current WHOOP Status: Active (This is the date your [employer] access code will )    4. Enter the last four digits of your Social Security Number (xxxx) and Date of Birth (mm/dd/yyyy) as indicated and click Submit. You will be taken to the next sign-up page. 5. Create a Piktochartt ID. This will be your [employer] login ID and cannot be changed, so think of one that is secure and easy to remember. 6. Create a [employer] password. You can change your password at any time. 7. Enter your Password Reset Question and Answer. This can be used at a later time if you forget your password. 8. Enter your e-mail address. You will receive e-mail notification when new information is available in [address] 9. Click Sign Up. You can now view and download portions of your medical record. 10. Click the Download Summary menu link to download a portable copy of your medical information. Additional Information    If you have questions, please call [phone]. Remember, [employer] is NOT to be used for urgent needs. For medical emergencies, dial 911.

## 2022-04-15 ENCOUNTER — TRANSCRIBE ORDER (OUTPATIENT)
Dept: SCHEDULING | Age: 72
End: 2022-04-15

## 2022-04-15 DIAGNOSIS — G57.60 MORTON NEUROMA: Primary | ICD-10-CM

## 2022-04-25 ENCOUNTER — HOSPITAL ENCOUNTER (OUTPATIENT)
Dept: MRI IMAGING | Age: 72
End: 2022-04-25
Attending: PODIATRIST

## 2022-05-26 ENCOUNTER — DOCUMENTATION ONLY (OUTPATIENT)
Dept: SLEEP MEDICINE | Age: 72
End: 2022-05-26

## 2022-05-26 NOTE — PROGRESS NOTES
[address] [place] [address] (: 1950) last seen by Dr. Annia Hernandez on 2022, prior notes reviewed in detail. In lab sleep testing ordered for initial evaluation, medical need for in-lab sleep study based on patient being on Suboxone Therapy (Central Apnea Prone) at this time. P2P completed 22 at 12:41pm    Approval for in lab study given  - [id number] valid from 22- 22.     Breezy Avila NP, [facility]  22

## 2022-05-29 ENCOUNTER — HOSPITAL ENCOUNTER (OUTPATIENT)
Dept: SLEEP MEDICINE | Age: 72
Discharge: HOME | End: 2022-05-29
Payer: COMMERCIAL

## 2022-05-29 ENCOUNTER — DOCUMENTATION ONLY (OUTPATIENT)
Dept: SLEEP MEDICINE | Age: 72
End: 2022-05-29

## 2022-05-29 VITALS
SYSTOLIC BLOOD PRESSURE: 123 MMHG | OXYGEN SATURATION: 98 % | DIASTOLIC BLOOD PRESSURE: 85 MMHG | HEART RATE: 82 BPM | TEMPERATURE: 98.1 F

## 2022-05-29 DIAGNOSIS — G47.33 OSA (OBSTRUCTIVE SLEEP APNEA): ICD-10-CM

## 2022-05-29 PROCEDURE — 95811 POLYSOM 6/>YRS CPAP 4/> PARM: CPT | Performed by: INTERNAL MEDICINE

## 2022-05-30 NOTE — PROGRESS NOTES
217 [facility]. [place], [address]  Tel.  [phone]  Fax. 16[address] [facility]  [place], [address] [facility]  Tel.  [phone]  Fax. [phone] [address]  Tel.  [phone]  Fax. [phone]     Sleep Study Technical Notes        PRE-Test:  · Wesley Ram (: 1950) arrived in the lobby. Patient is scheduled for a diagnostic study. Patient was greeted, temperature checked (98.1 ) and screening questions asked. The patient was taken directly to the assigned room. BP (123/85) and SpO2 (98%) were taken. Procedure was explained to the patient and questions were answered. Pt expressed understanding. Electrodes were applied without incident. The patient was placed in bed and the study was started. Acquisition Notes:  · Lights off: 9:55 PM   · Sleep onset: 10:09 PM  · Respiratory events: Severe obstructive, central and mixed respiratory events  · Snore: Loud  · ECG:  Abnormalities in EKG  · Limb movements: No  · PAP titration: CPAP 4 cmH2O to CPAP 14 cmH2O  · Mask(s) Used: F&P Vitera FFM, medium  Other comments: Patient fell asleep while watching tv. Patient immediately began to snore and have respiratory events. Oxygen levels dropped into the low 80th percentile. Emergency split night criteria was met, therefore CPAP was initiated despite being scheduled for a diagnostic study. Patient was started on a F&P Vitera FFM, medium, pressure of 4 cmH2O. Patient tolerated PAP well and returned to sleep quickly. Patient was titrated to a final pressure of 14 cmH2O. This pressure eliminated the majority of events and snore. The patient's Oxygen levels remained in the 90th percentile. Leak was elevated due to facial hair. Patient entered all stages of sleep. He slept in the supine and left positions.   - Patient used the restroom 3 times  - Patient had caregiver in attendance: No   - The television remained on for almost two hours  - Patient slept with positional therapy:  No  - Patient slept with head of bed elevated:  No  · Patient wore an oral appliance:  No    POST Test:  Patient was awakened. Electrodes were removed. The patient was discharged after completing the Post Questionnaire. Patient stated that they were alert and ok to drive. Equipment and room cleaned per infection control policy.

## 2022-06-01 DIAGNOSIS — I10 ESSENTIAL HYPERTENSION: ICD-10-CM

## 2022-06-01 RX ORDER — LISINOPRIL 10 MG/1
10 TABLET ORAL DAILY
Qty: 90 TABLET | Refills: 1 | OUTPATIENT
Start: 2022-06-01

## 2022-06-01 RX ORDER — DILTIAZEM HYDROCHLORIDE 120 MG/1
120 CAPSULE, COATED, EXTENDED RELEASE ORAL DAILY
Qty: 90 CAPSULE | Refills: 0 | OUTPATIENT
Start: 2022-06-01

## 2022-06-01 NOTE — TELEPHONE ENCOUNTER
Requested Prescriptions     Pending Prescriptions Disp Refills    lisinopriL (PRINIVIL, ZESTRIL) 10 mg tablet 90 Tablet 1     Sig: Take 1 Tablet by mouth daily.  dilTIAZem ER (CARDIZEM CD) 120 mg capsule 90 Capsule 0     Sig: Take 1 Capsule by mouth daily.         Last Visit 01/07/22  Last Refill 01/07/22

## 2022-06-05 ENCOUNTER — TELEPHONE (OUTPATIENT)
Dept: SLEEP MEDICINE | Age: 72
End: 2022-06-05

## 2022-06-05 DIAGNOSIS — G47.33 OSA (OBSTRUCTIVE SLEEP APNEA): Primary | ICD-10-CM

## 2022-06-06 ENCOUNTER — TELEPHONE (OUTPATIENT)
Dept: SLEEP MEDICINE | Age: 72
End: 2022-06-06

## 2022-06-06 NOTE — TELEPHONE ENCOUNTER
Tyree Frances. underwent Sleep Testing which was indicative of an average AHI of 49.1 per hour with an SpO2 moni of 82% and SpO2 of < 88% being 4.80 minutes. An APAP prescription has been written and patient will be contacted by office staff regarding follow-up  in 2-3 months after initiation of therapy. Encounter Diagnosis   Name Primary?  VITALIY (obstructive sleep apnea) Yes       Orders Placed This Encounter    AMB SUPPLY ORDER     Diagnosis: Obstructive Sleep Apnea ICD-10 Code (G47.33)    Positive Airway Pressure Therapy: Duration of need: 99 months. APAP Device with Heated Humidifer [id number] / [id number]. Minimum Pressure: 09 cmH2O, Maximum Pressure: 15 cmH2O.     Nasal Pillows Combo Mask (Replace) 2 per month.  Nasal Pillows (Replace) 2 per month.  Full Face Mask 1 every 3 months.  Full Face Mask Cushion 1 per month.  Nasal Cushion (Replace) 2 per month.  Nasal Interface Mask 1 every 3 months.  Headgear 1 every 6 months.  Chinstrap 1 every 6 months.  Tubing 1 every 3 months.  Filter(s) Disposable 2 per month.  Filter(s) Non-Disposable 1 every 6 months. .   [address] for Humidifier (Replace) 1 every 6 months. Perform Mask Fitting per patient preference and comfort - replace as above. Chepe Lopes MD, FAA; NPI: [id number]  Electronically signed. 06/06/22

## 2022-06-07 NOTE — TELEPHONE ENCOUNTER
Results of Sleep Testing reviewed with patient who agrees to a trial of APAP therapy. PAP prescription and follow-up discussed with patient. Patient encouraged to call if there were any further questions regarding sleep symptoms.

## 2022-06-09 ENCOUNTER — OFFICE VISIT (OUTPATIENT)
Dept: CARDIOLOGY CLINIC | Age: 72
End: 2022-06-09
Payer: COMMERCIAL

## 2022-06-09 VITALS
HEART RATE: 70 BPM | HEIGHT: 70 IN | WEIGHT: 177.6 LBS | BODY MASS INDEX: 25.43 KG/M2 | SYSTOLIC BLOOD PRESSURE: 130 MMHG | DIASTOLIC BLOOD PRESSURE: 86 MMHG | RESPIRATION RATE: 14 BRPM | OXYGEN SATURATION: 97 %

## 2022-06-09 DIAGNOSIS — G47.33 OSA (OBSTRUCTIVE SLEEP APNEA): ICD-10-CM

## 2022-06-09 DIAGNOSIS — I77.810 AORTIC ROOT DILATATION (HCC): ICD-10-CM

## 2022-06-09 DIAGNOSIS — I10 ESSENTIAL HYPERTENSION: ICD-10-CM

## 2022-06-09 DIAGNOSIS — I51.7 BIATRIAL ENLARGEMENT: Primary | ICD-10-CM

## 2022-06-09 PROCEDURE — 1123F ACP DISCUSS/DSCN MKR DOCD: CPT | Performed by: INTERNAL MEDICINE

## 2022-06-09 PROCEDURE — 99214 OFFICE O/P EST MOD 30 MIN: CPT | Performed by: INTERNAL MEDICINE

## 2022-06-09 NOTE — PATIENT INSTRUCTIONS
Please have your labs drawn    You will need to follow up in clinic with Dr. Naman Jack in 6 months.

## 2022-06-09 NOTE — PROGRESS NOTES
Cardiac Electrophysiology OFFICE Note     Subjective:      Do Bynum. is a 81 y.o. patient who had been seen for evaluation/management of abnormal ECG. He was kindly referred by Dr. Kevin Urias. ECG had been done in 01/2021 for properative clearance (bunionectomy). ECG showed NSR with incomplete RBBB & 1st degree AVB. Review of prior ECG in 2017 also showed incomplete RBBB, but 1st degree AVB wasn't present then. Occasionally \"hears\" his heart beating, but denies rapid palpitations. Stress echo was negative for ischemia (6/7/2021)  2 D echo with RV and biatrial enlargement  He was referred to sleep study and has apnea 49/hr, he is prescribed APAP by Dr Nuala Bumpers       ECHO ADULT COMPLETE 06/23/2021 6/23/2021    Interpretation Summary  · LV: Calculated LVEF is 62%. Biplane method used to measure ejection fraction. Normal cavity size and systolic function (ejection fraction normal). Upper normal wall thickness. Wall motion: normal. Mild (grade 1) left ventricular diastolic dysfunction. · LA: Moderately dilated left atrium. · RV: Mildly dilated right ventricle. · RA: Severely dilated right atrium. · MV: Mitral valve thickening. Mild mitral valve regurgitation is present. · TV: Mild tricuspid valve regurgitation is present. Right Ventricular Arterial Pressure (RVSP) is 29 mmHg. Pulmonary hypertension not suggested by Doppler findings. · PV: Mild pulmonic valve regurgitation is present. · AO: Mild ascending aorta dilatation. Ascending aorta diameter = 4.2 cm. Diastolic BP borderline high but recheck was normal       Previous:  Evaluated for chest discomfort in 2014, had normal stress echo. Echo 2014 with normal LVEF and mild MR TR    Father had heart problems (specifics unknown) in his 50s-60s, had multiple hospitalizations. Other family members on paternal side with heart disease.       Problem List  Date Reviewed: 6/9/2022          Codes Class Noted    Hepatitis C antibody positive in blood ICD-10-CM: R76.8  ICD-9-CM: 795.79  9/21/2021    Overview Signed 9/21/2021  9:42 AM by Margaux Doan., RUY     Pt has been treated and cured of active HCV. This test will always be positive. Hepatitis C virus infection cured after antiviral drug therapy ICD-10-CM: Z86.19  ICD-9-CM: V12.09  9/21/2021    Overview Signed 9/21/2021  9:42 AM by Margaux Doan., NP     Treated with 8 weeks of Mavyret, completed in 9/2020. He has achieved SVR. H/O gastric ulcer ICD-10-CM: Z87.11  ICD-9-CM: V12.79  2/9/2017        Vitamin D deficiency ICD-10-CM: E55.9  ICD-9-CM: 268.9  5/19/2015        Cervical pain (neck) ICD-10-CM: M54.2  ICD-9-CM: 723.1  3/13/2015        Encounter for monitoring Suboxone maintenance therapy ICD-10-CM: Z51.81, Z79.899  ICD-9-CM: V58.83, V58.69  8/3/2011        H/O partial resection of colon ICD-10-CM: Z90.49  ICD-9-CM: V45.72  8/27/2010    Overview Signed 3/6/2020  8:58 AM by Ashley Hermosillo MD     Due to diverticulitis and colon stricture             Hypertension ICD-10-CM: I10  ICD-9-CM: 401.9  8/27/2010              Current Outpatient Medications   Medication Sig Dispense Refill    lisinopriL (PRINIVIL, ZESTRIL) 10 mg tablet Take 1 Tablet by mouth daily. 90 Tablet 1    dilTIAZem ER (CARDIZEM CD) 120 mg capsule Take 1 Capsule by mouth daily. 90 Capsule 0    diclofenac (VOLTAREN) 1 % gel as needed.  diclofenac EC (VOLTAREN) 75 mg EC tablet Take 75 mg by mouth daily.  fluticasone propionate (FLONASE) 50 mcg/actuation nasal spray SPRAY 2 SPRAYS INTO EACH NOSTRIL EVERY DAY AS NEEDED 1 Bottle 2    buprenorphine-naloxone (SUBOXONE) 8-2 mg Subl 2 Tabs by SubLINGual route daily. (Patient taking differently: 1.5 Tabs by SubLINGual route daily.) 60 Tab 2    multivitamin (ONE A DAY) tablet Take 1 Tab by mouth daily.        No Known Allergies  Past Medical History:   Diagnosis Date    Hypertension     PUD (peptic ulcer disease)     Seizures ([facility] [address]     as a child-none since ~1976     Past Surgical History:   Procedure Laterality Date    HX APPENDECTOMY      HX GI      surgical repair of bleeding ulcer    HX GI      colon resection    HX HEENT      T&A    HX KNEE ARTHROSCOPY  1996    right knee    HX ORTHOPAEDIC  2003 & 2005    left knee     Family History   Problem Relation Age of Onset    Heart Disease Mother     Diabetes Father     Diabetes Sister     Diabetes Paternal Grandmother      Social History     Tobacco Use    Smoking status: Former Smoker    Smokeless tobacco: Never Used    Tobacco comment: quit May 2010   Substance Use Topics    Alcohol use: Not Currently     Alcohol/week: 0.8 standard drinks     Types: 1 Cans of beer per week        Review of Systems: Review of all other systems otherwise negative. Constitutional: Negative for fever, chills, weight loss, malaise/fatigue. HEENT: Negative for nosebleeds, vision changes. +snoring  Respiratory: Negative for cough, hemoptysis  Cardiovascular:  no palpitations, orthopnea, claudication, leg swelling, syncope, and PND. Gastrointestinal: Negative for nausea, vomiting, diarrhea, blood in stool and melena. Genitourinary: Negative for dysuria, and hematuria. Musculoskeletal: Negative for myalgias, + foot arthralgia. Skin: Negative for rash. Heme: Does not bleed or bruise easily. Neurological: Negative for speech change and focal weakness. + foot burning pain     Objective:     Visit Vitals  /86 (BP 1 Location: Left arm, BP Patient Position: Sitting, BP Cuff Size: Adult)   Pulse 70   Resp 14   Ht 5' 10\" (1.778 m)   Wt 177 lb 9.6 oz (80.6 kg)   SpO2 97%   BMI 25.48 kg/m²      Physical Exam:   Constitutional: Well-developed and well-nourished. No respiratory distress. Head: Normocephalic and atraumatic. Eyes: Pupils are equal, round. ENT: Hearing grossly normal.  Neck: Supple. No JVD present. Cardiovascular: Normal rate, regular rhythm.  Exam reveals no gallop and no friction rub. No murmur heard. Pulmonary/Chest: Effort normal and breath sounds normal. No wheezes. Abdominal: Soft, no tenderness. Musculoskeletal: Moves extremities independently. Normal gait. Vasc/lymphatic: No edema. Neurological: Alert,oriented. Skin: Skin is warm and dry. Psychiatric: Normal mood and affect. Behavior is normal. Judgment and thought content normal.      EKG: NSR with incomplete RBBB, 1st degree AVB. Assessment/Plan:        ICD-10-CM ICD-9-CM    1. Biatrial enlargement  I51.7 429.3 PROTEIN ELECTROPHORESIS W/ REFLX BERNADETTE      PROTEIN ELECTROPHORESIS W/ REFLX BERNADETTE   2. Essential hypertension  I10 401.9 PROTEIN ELECTROPHORESIS W/ REFLX BERNADETTE      PROTEIN ELECTROPHORESIS W/ REFLX BERNDAETTE   3. VITALIY (obstructive sleep apnea)  G47.33 327.23 PROTEIN ELECTROPHORESIS W/ REFLX BERNADETTE      PROTEIN ELECTROPHORESIS W/ REFLX BERNADETTE   4. Aortic root dilatation (HCC)  I77.810 447.71 PROTEIN ELECTROPHORESIS W/ REFLX BERNADETTE      PROTEIN ELECTROPHORESIS W/ REFLX BERNADETTE     Conduction abnormality: Incomplete RBBB present on 2017 ECG as well. 1st degree AVB new but mild and he can continue with cardizem for now. No syncope. Continue to monitor. HTN: controlled today. Continue diltiazem  mg po daily and lisinopril for now. Right heart and biatrial enlargement could be related to sleep apnea: confirmed with sleep study  APAP pending  Mild dilation of aorta 42 mm  Continue to follow up over time  Will need chest CT with contrast if has chest pain or growth is fast  I recommend SPEP to screen for amyloid given heart enlargement and foot peripheral neuropathy  He agrees    RTC 6 months          Future Appointments   Date Time Provider Adriano Nilda   6/14/2022  2:00 PM Miranda Leavitt MD [facility] AMB     Thank you for involving me in this patient's care and please call with further concerns or questions. Braeden Em M.D.   Electrophysiology/Cardiology  [facility] [place]  [id number], [address]  [phone]

## 2022-06-10 ENCOUNTER — TELEPHONE (OUTPATIENT)
Dept: SLEEP MEDICINE | Age: 72
End: 2022-06-10

## 2022-06-10 ENCOUNTER — DOCUMENTATION ONLY (OUTPATIENT)
Dept: SLEEP MEDICINE | Age: 72
End: 2022-06-10

## 2022-06-10 LAB
ALBUMIN SERPL ELPH-MCNC: 4.3 G/DL (ref 2.9–4.4)
ALBUMIN/GLOB SERPL: 1.7 {RATIO} (ref 0.7–1.7)
ALPHA1 GLOB SERPL ELPH-MCNC: 0.2 G/DL (ref 0–0.4)
ALPHA2 GLOB SERPL ELPH-MCNC: 0.7 G/DL (ref 0.4–1)
B-GLOBULIN SERPL ELPH-MCNC: 0.8 G/DL (ref 0.7–1.3)
GAMMA GLOB SERPL ELPH-MCNC: 0.8 G/DL (ref 0.4–1.8)
GLOBULIN SER CALC-MCNC: 2.5 G/DL (ref 2.2–3.9)
M PROTEIN SERPL ELPH-MCNC: NORMAL G/DL
PLEASE NOTE, 011150: NORMAL
PROT PATTERN SERPL ELPH-IMP: NORMAL
PROT SERPL-MCNC: 6.8 G/DL (ref 6–8.5)

## 2022-06-10 NOTE — TELEPHONE ENCOUNTER
Order faxed to [facility] was made aware and given their phone number. He will call back to schedule 1st adh after getting his machine.

## 2022-06-14 ENCOUNTER — OFFICE VISIT (OUTPATIENT)
Dept: PRIMARY CARE CLINIC | Age: 72
End: 2022-06-14
Payer: COMMERCIAL

## 2022-06-14 VITALS
HEIGHT: 70 IN | SYSTOLIC BLOOD PRESSURE: 139 MMHG | RESPIRATION RATE: 16 BRPM | HEART RATE: 62 BPM | BODY MASS INDEX: 25.74 KG/M2 | WEIGHT: 179.8 LBS | OXYGEN SATURATION: 98 % | TEMPERATURE: 97.5 F | DIASTOLIC BLOOD PRESSURE: 87 MMHG

## 2022-06-14 DIAGNOSIS — I10 ESSENTIAL HYPERTENSION: Primary | ICD-10-CM

## 2022-06-14 DIAGNOSIS — B18.2 CHRONIC HEPATITIS C WITHOUT HEPATIC COMA (HCC): ICD-10-CM

## 2022-06-14 DIAGNOSIS — M79.672 LEFT FOOT PAIN: ICD-10-CM

## 2022-06-14 DIAGNOSIS — G57.62 MORTON'S NEUROMA OF LEFT FOOT: ICD-10-CM

## 2022-06-14 PROCEDURE — 99214 OFFICE O/P EST MOD 30 MIN: CPT | Performed by: INTERNAL MEDICINE

## 2022-06-14 PROCEDURE — 1123F ACP DISCUSS/DSCN MKR DOCD: CPT | Performed by: INTERNAL MEDICINE

## 2022-06-14 RX ORDER — LISINOPRIL 10 MG/1
10 TABLET ORAL DAILY
Qty: 90 TABLET | Refills: 1 | Status: SHIPPED | OUTPATIENT
Start: 2022-06-14

## 2022-06-14 RX ORDER — DILTIAZEM HYDROCHLORIDE 120 MG/1
120 CAPSULE, COATED, EXTENDED RELEASE ORAL DAILY
Qty: 90 CAPSULE | Refills: 1 | Status: SHIPPED | OUTPATIENT
Start: 2022-06-14

## 2022-06-14 NOTE — PROGRESS NOTES
Chief Complaint   Patient presents with   [address] Other     referral to a podiatrist        Visit Vitals  /87 (BP 1 Location: Left upper arm, BP Patient Position: Sitting)   Pulse 62   Temp 97.5 °F (36.4 °C) (Temporal)   Resp 16   Ht 5' 10\" (1.778 m)   Wt 179 lb 12.8 oz (81.6 kg)   SpO2 98%   BMI 25.80 kg/m²        There are no preventive care reminders to display for this patient. 1. Have you been to the ER, urgent care clinic since your last visit? Hospitalized since your last visit? No    2. Have you seen or consulted any other health care providers outside of the [address] [place] [address] since your last visit? Include any pap smears or colon screening.  No

## 2022-06-14 NOTE — PROGRESS NOTES
Nabeel Figueroa. is a 81 y.o.  male and presents with     Chief Complaint   Patient presents with   [address] Other     referral to a podiatrist     Pt is here to establish care. Pt had bunuionectomy in January 2021. Pt says after the  Surgery he has aching pain in his left foot. There is a thought that the nerve is damaged  MRI was planned , but not approved by insurance   Pt was taking Gabapentin . Started seeing another doctor , [facility]  Pt says gabapentin was not helping. Got injections in his foot. Had pre op eval by cardiology - and was found to have mild aortic dilation - test was neg for amyloidosis. Past Medical History:   Diagnosis Date    Hypertension     PUD (peptic ulcer disease)     Seizures ([facility] [address]     as a child-none since ~1976     Past Surgical History:   Procedure Laterality Date    HX APPENDECTOMY      HX GI      surgical repair of bleeding ulcer    HX GI      colon resection    HX HEENT      T&A    HX KNEE ARTHROSCOPY  1996    right knee    HX ORTHOPAEDIC  2003 & 2005    left knee     Current Outpatient Medications   Medication Sig    lisinopriL (PRINIVIL, ZESTRIL) 10 mg tablet Take 1 Tablet by mouth daily.  dilTIAZem ER (CARDIZEM CD) 120 mg capsule Take 1 Capsule by mouth daily.  fluticasone propionate (FLONASE) 50 mcg/actuation nasal spray SPRAY 2 SPRAYS INTO EACH NOSTRIL EVERY DAY AS NEEDED    multivitamin (ONE A DAY) tablet Take 1 Tab by mouth daily. No current facility-administered medications for this visit.      Health Maintenance   Topic Date Due    Depression Screen  06/09/2023    Colorectal Cancer Screening Combo  11/15/2023    DTaP/Tdap/Td series (2 - Td or Tdap) 05/18/2025    Lipid Screen  11/03/2026    Hepatitis C Screening  Completed    Shingrix Vaccine Age 50>  Completed    Flu Vaccine  Completed    COVID-19 Vaccine  Completed    Pneumococcal 65+ years  Completed     Immunization History   Administered Date(s) Administered   Barbara Larger Gildardo Sosa, Primary or Immunocompromised Series, MRNA, PF, 100mcg/0.5mL 02/20/2021, 03/22/2021, 11/17/2021    Influenza High Dose Vaccine PF 12/13/2016, 10/16/2017, 09/08/2021    Influenza Vaccine 01/11/2013, 11/14/2014, 11/20/2015    Influenza Vaccine (Tri) Adjuvanted (>65 Yrs FLUAD TRI [id number]) 03/18/2019    Influenza Vaccine Split 10/28/2011    Pneumococcal Conjugate (PCV-13) 06/09/2017    Pneumococcal Polysaccharide (PPSV-23) 03/18/2019    Tdap 05/18/2015    Zoster Recombinant 09/13/2020, 01/15/2021     No LMP for male patient. Allergies and Intolerances:   No Known Allergies    Family History:   Family History   Problem Relation Age of Onset    Heart Disease Mother     Diabetes Father     Diabetes Sister     Diabetes Paternal Grandmother        Social History:   He  reports that he quit smoking about 12 years ago. His smoking use included cigarettes. He started smoking about 54 years ago. He has a 10.50 pack-year smoking history. He has never used smokeless tobacco.  He  reports previous alcohol use of about 0.8 standard drinks of alcohol per week.             Review of Systems:   General: negative for - chills, fatigue, fever, weight change  Psych: negative for - anxiety, depression, irritability or mood swings  ENT: negative for - headaches, hearing change, nasal congestion, oral lesions, sneezing or sore throat  Heme/ Lymph: negative for - bleeding problems, bruising, pallor or swollen lymph nodes  Endo: negative for - hot flashes, polydipsia/polyuria or temperature intolerance  Resp: negative for - cough, shortness of breath or wheezing  CV: negative for - chest pain, edema or palpitations  GI: negative for - abdominal pain, change in bowel habits, constipation, diarrhea or nausea/vomiting  : negative for - dysuria, hematuria, incontinence, pelvic pain or vulvar/vaginal symptoms  MSK: negative for - joint pain, joint swelling or muscle pain  Neuro: negative for - confusion, headaches, seizures or weakness  Derm: negative for - dry skin, hair changes, rash or skin lesion changes          Physical:   Vitals:   Vitals:    06/14/22 1402   BP: 139/87   Pulse: 62   Resp: 16   Temp: 97.5 °F (36.4 °C)   TempSrc: Temporal   SpO2: 98%   Weight: 179 lb 12.8 oz (81.6 kg)   Height: 5' 10\" (1.778 m)           Exam:   HEENT- atraumatic,normocephalic, awake, oriented, well nourished  Neck - supple,no enlarged lymph nodes, no JVD, no thyromegaly  Chest- CTA, no rhonchi, no crackles  Heart- rrr, no murmurs / gallop/rub  Abdomen- soft,BS+,NT, no hepatosplenomegaly  Ext - no c/c/edema , tenderness in the foot on compressing the foot by applyig pressure from both sides  Neuro- no focal deficits. Power 5/5 all extremities  Skin - warm,dry, no obvious rashes. Review of Data:   LABS:   Lab Results   Component Value Date/Time    WBC 5.6 11/03/2021 12:00 AM    HGB 14.0 11/03/2021 12:00 AM    HCT 40.8 11/03/2021 12:00 AM    PLATELET 298 05/58/9564 12:00 AM     Lab Results   Component Value Date/Time    Sodium 141 01/07/2022 12:00 AM    Potassium 5.1 01/07/2022 12:00 AM    Chloride 101 01/07/2022 12:00 AM    CO2 26 01/07/2022 12:00 AM    Glucose 98 01/07/2022 12:00 AM    BUN 12 01/07/2022 12:00 AM    Creatinine 1.09 01/07/2022 12:00 AM     Lab Results   Component Value Date/Time    Cholesterol, total 255 (H) 11/03/2021 12:00 AM    HDL Cholesterol 147 11/03/2021 12:00 AM    LDL, calculated 100 (H) 11/03/2021 12:00 AM    LDL, calculated 90 08/25/2020 12:00 AM    Triglyceride 50 11/03/2021 12:00 AM     No components found for: GPT        Impression / Plan:        ICD-10-CM ICD-9-CM    1. Essential hypertension  I10 401.9 lisinopriL (PRINIVIL, ZESTRIL) 10 mg tablet      dilTIAZem ER (CARDIZEM CD) 120 mg capsule   2. Chronic hepatitis C without hepatic coma (HCC)  B18.2 070.54    3. Left foot pain  M79.672 729.5 REFERRAL TO PODIATRY      MRI FOOT LT WO CONT   4.  Delcid's neuroma of left foot  G57.62 355.6 REFERRAL TO PODIATRY      MRI FOOT LT WO CONT     R/o Mortons neuroma. Hep C - already completed treatment for Hep C. Explained to patient risk benefits of the medications. Advised patient to stop meds if having any side effects. Pt verbalized understanding of the instructions. I have discussed the diagnosis with the patient and the intended plan as seen in the above orders. The patient has received an after-visit summary and questions were answered concerning future plans. I have discussed medication side effects and warnings with the patient as well. I have reviewed the plan of care with the patient, accepted their input and they are in agreement with the treatment goals. Reviewed plan of care. Patient has provided input and agrees with goals. Follow-up and Dispositions    · Return in about 3 months (around 9/14/2022).          Jhoana Cullen MD

## 2022-06-22 ENCOUNTER — TELEPHONE (OUTPATIENT)
Dept: CARDIOLOGY CLINIC | Age: 72
End: 2022-06-22

## 2022-06-22 NOTE — TELEPHONE ENCOUNTER
Verified patient with two types of identifiers. Notified of results and MD recommendations. Patient verbalized understanding and will call with any other questions.       Future Appointments   Date Time Provider Adriano Munguia   12/20/2022 10:20 AM MD MACARIO Sam AMB

## 2022-06-22 NOTE — TELEPHONE ENCOUNTER
----- Message from Neeraj Wilkerson MD sent at 6/22/2022 12:31 PM EDT -----  Normal study  Future Appointments  12/20/2022 10:20 AM   MD MACARIO Dunlap AMB

## 2022-09-13 DIAGNOSIS — J30.2 SEASONAL ALLERGIC RHINITIS, UNSPECIFIED TRIGGER: ICD-10-CM

## 2022-09-16 RX ORDER — FLUTICASONE PROPIONATE 50 MCG
SPRAY, SUSPENSION (ML) NASAL
Qty: 1 EACH | Refills: 2 | Status: SHIPPED | OUTPATIENT
Start: 2022-09-16

## 2022-09-16 NOTE — TELEPHONE ENCOUNTER
PCP: Apoorva Joe MD    Last appt: 6/14/2022  Future Appointments   Date Time Provider Adriano Munguia   12/20/2022 10:20 AM MD MACARIO Cotter AMB       Requested Prescriptions     Pending Prescriptions Disp Refills    fluticasone propionate (FLONASE) 50 mcg/actuation nasal spray 1 Each 2     Sig: SPRAY 2 SPRAYS INTO EACH NOSTRIL EVERY DAY AS NEEDED         Other Comments:

## 2022-11-09 ENCOUNTER — OFFICE VISIT (OUTPATIENT)
Dept: PRIMARY CARE CLINIC | Age: 72
End: 2022-11-09
Payer: COMMERCIAL

## 2022-11-09 ENCOUNTER — HOSPITAL ENCOUNTER (OUTPATIENT)
Dept: GENERAL RADIOLOGY | Age: 72
Discharge: HOME | End: 2022-11-09
Attending: INTERNAL MEDICINE
Payer: COMMERCIAL

## 2022-11-09 VITALS
SYSTOLIC BLOOD PRESSURE: 145 MMHG | DIASTOLIC BLOOD PRESSURE: 81 MMHG | OXYGEN SATURATION: 97 % | TEMPERATURE: 97.1 F | BODY MASS INDEX: 26.2 KG/M2 | HEIGHT: 70 IN | HEART RATE: 79 BPM | RESPIRATION RATE: 18 BRPM | WEIGHT: 183 LBS

## 2022-11-09 DIAGNOSIS — Z01.818 PRE-OP EVALUATION: ICD-10-CM

## 2022-11-09 DIAGNOSIS — M79.672 LEFT FOOT PAIN: ICD-10-CM

## 2022-11-09 DIAGNOSIS — I10 ESSENTIAL HYPERTENSION: Primary | ICD-10-CM

## 2022-11-09 PROBLEM — Z99.89 OSA ON CPAP: Status: ACTIVE | Noted: 2022-11-09

## 2022-11-09 PROBLEM — G47.33 OSA ON CPAP: Status: ACTIVE | Noted: 2022-11-09

## 2022-11-09 PROCEDURE — 93000 ELECTROCARDIOGRAM COMPLETE: CPT | Performed by: INTERNAL MEDICINE

## 2022-11-09 PROCEDURE — 3074F SYST BP LT 130 MM HG: CPT | Performed by: INTERNAL MEDICINE

## 2022-11-09 PROCEDURE — 99214 OFFICE O/P EST MOD 30 MIN: CPT | Performed by: INTERNAL MEDICINE

## 2022-11-09 PROCEDURE — 1123F ACP DISCUSS/DSCN MKR DOCD: CPT | Performed by: INTERNAL MEDICINE

## 2022-11-09 PROCEDURE — 71046 X-RAY EXAM CHEST 2 VIEWS: CPT

## 2022-11-09 PROCEDURE — 3078F DIAST BP <80 MM HG: CPT | Performed by: INTERNAL MEDICINE

## 2022-11-09 RX ORDER — LISINOPRIL 20 MG/1
20 TABLET ORAL DAILY
Qty: 90 TABLET | Refills: 1 | Status: SHIPPED | OUTPATIENT
Start: 2022-11-09

## 2022-11-09 NOTE — PROGRESS NOTES
Chief Complaint   Patient presents with    Pre-op Exam     Front left foot   Surgery date 11/17/2022        Visit Vitals  BP (!) 145/81 (BP 1 Location: Left upper arm, BP Patient Position: Sitting)   Pulse 79   Temp 97.1 °F (36.2 °C) (Temporal)   Resp 18   Ht 5' 10\" (1.778 m)   Wt 183 lb (83 kg)   SpO2 97%   BMI 26.26 kg/m²        Health Maintenance Due   Topic    Hepatitis B Vaccine (1 of 3 - Risk 3-dose series)    AAA Screening 73-69 YO Male Smoking Patients         1. \"Have you been to the ER, urgent care clinic since your last visit? Hospitalized since your last visit? \" No    2. \"Have you seen or consulted any other health care providers outside of the [address] [place] [address] since your last visit? \" No     3. For patients aged 39-70: Has the patient had a colonoscopy / FIT/   Next due 11/15/2023    If the patient is female:    4. For patients aged 41-77: Has the patient had a mammogram within the past 2 years? NA - based on age or sex      11. For patients aged 21-65: Has the patient had a pap smear?  NA - based on age or sex

## 2022-11-09 NOTE — PROGRESS NOTES
Lesli Marcum is a 81 y.o.  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 Had ECHO done  last year that showed normal EF but dilatation of left atrium  Mild mitral regurg. Also has rt atrial enlargement. Pt had sleep study and is supposed  to wear CPAP. Pt had test for myeloma that was neg. Patient denies any chest pain shortness of breath orthopnea PND or leg swelling. No fever or chills  Blood pressure is slightly elevated    Past Medical History:   Diagnosis Date    Hypertension     PUD (peptic ulcer disease)     Seizures ([address]     as a child-none since ~1976     Past Surgical History:   Procedure Laterality Date    HX APPENDECTOMY      HX GI      surgical repair of bleeding ulcer    HX GI      colon resection    HX HEENT      T&A    HX KNEE ARTHROSCOPY  1996    right knee    HX ORTHOPAEDIC  2003 & 2005    left knee     Current Outpatient Medications   Medication Sig    lisinopriL (PRINIVIL, ZESTRIL) 20 mg tablet Take 1 Tablet by mouth daily. fluticasone propionate (FLONASE) 50 mcg/actuation nasal spray SPRAY 2 SPRAYS INTO EACH NOSTRIL EVERY DAY AS NEEDED    dilTIAZem ER (CARDIZEM CD) 120 mg capsule Take 1 Capsule by mouth daily. multivitamin (ONE A DAY) tablet Take 1 Tab by mouth daily. No current facility-administered medications for this visit.      Health Maintenance   Topic Date Due    Hepatitis B Vaccine (1 of 3 - Risk 3-dose series) Never done    AAA Screening 73-67 YO Male Smoking Patients  Never done    Depression Screen  06/09/2023    Colorectal Cancer Screening Combo  11/15/2023    DTaP/Tdap/Td series (2 - Td or Tdap) 05/18/2025    Lipid Screen  11/03/2026    Hepatitis C Screening  Completed    Shingrix Vaccine Age 50>  Completed    Flu Vaccine  Completed    COVID-19 Vaccine  Completed Pneumococcal 65+ years  Completed     Immunization History   Administered Date(s) Administered    COVID-19, MODERNA BLUE border, Primary or Immunocompromised, (age 18y+), IM, 100 mcg/0.5mL 02/20/2021, 03/22/2021, 10/29/2021, 05/06/2022, 09/29/2022    Influenza High Dose Vaccine PF 12/13/2016, 10/16/2017, 09/08/2021, 10/10/2022    Influenza Vaccine 01/11/2013, 11/14/2014, 11/20/2015    Influenza Vaccine (Tri) Adjuvanted (>65 Yrs FLUAD TRI [id number]) 03/18/2019    Influenza Vaccine Split 10/28/2011    Pneumococcal Conjugate (PCV-13) 06/09/2017    Pneumococcal Polysaccharide (PPSV-23) 03/18/2019, 10/10/2022    Tdap 05/18/2015    Zoster Recombinant 09/13/2020, 01/15/2021     No LMP for male patient. Allergies and Intolerances:   No Known Allergies    Family History:   Family History   Problem Relation Age of Onset    Heart Disease Mother     Diabetes Father     Diabetes Sister     Diabetes Paternal Grandmother        Social History:   He  reports that he quit smoking about 12 years ago. His smoking use included cigarettes. He started smoking about 54 years ago. He has a 10.50 pack-year smoking history. He has never used smokeless tobacco.  He  reports that he does not currently use alcohol after a past usage of about 0.8 standard drinks per week.             Review of Systems:   General: negative for - chills, fatigue, fever, weight change  Psych: negative for - anxiety, depression, irritability or mood swings  ENT: negative for - headaches, hearing change, nasal congestion, oral lesions, sneezing or sore throat  Heme/ Lymph: negative for - bleeding problems, bruising, pallor or swollen lymph nodes  Endo: negative for - hot flashes, polydipsia/polyuria or temperature intolerance  Resp: negative for - cough, shortness of breath or wheezing  CV: negative for - chest pain, edema or palpitations  GI: negative for - abdominal pain, change in bowel habits, constipation, diarrhea or nausea/vomiting  : negative for - dysuria, hematuria, incontinence, pelvic pain or vulvar/vaginal symptoms  MSK: negative for - joint pain, joint swelling or muscle pain  Neuro: negative for - confusion, headaches, seizures or weakness  Derm: negative for - dry skin, hair changes, rash or skin lesion changes          Physical:   Vitals:   Vitals:    11/09/22 1342   BP: (!) 145/81   Pulse: 79   Resp: 18   Temp: 97.1 °F (36.2 °C)   TempSrc: Temporal   SpO2: 97%   Weight: 183 lb (83 kg)   Height: 5' 10\" (1.778 m)           Exam:   HEENT- atraumatic,normocephalic, awake, oriented, well nourished  Neck - supple,no enlarged lymph nodes, no JVD, no thyromegaly  Chest- CTA, no rhonchi, no crackles  Heart- rrr, no murmurs / gallop/rub  Abdomen- soft,BS+,NT, no hepatosplenomegaly  Ext - no c/c/edema   Neuro- no focal deficits. Power 5/5 all extremities  Skin - warm,dry, no obvious rashes. Review of Data:   LABS:   Lab Results   Component Value Date/Time    WBC 5.6 11/03/2021 12:00 AM    HGB 14.0 11/03/2021 12:00 AM    HCT 40.8 11/03/2021 12:00 AM    PLATELET 735 66/75/1542 12:00 AM     Lab Results   Component Value Date/Time    Sodium 141 01/07/2022 12:00 AM    Potassium 5.1 01/07/2022 12:00 AM    Chloride 101 01/07/2022 12:00 AM    CO2 26 01/07/2022 12:00 AM    Glucose 98 01/07/2022 12:00 AM    BUN 12 01/07/2022 12:00 AM    Creatinine 1.09 01/07/2022 12:00 AM     Lab Results   Component Value Date/Time    Cholesterol, total 255 (H) 11/03/2021 12:00 AM    HDL Cholesterol 147 11/03/2021 12:00 AM    LDL, calculated 100 (H) 11/03/2021 12:00 AM    LDL, calculated 90 08/25/2020 12:00 AM    Triglyceride 50 11/03/2021 12:00 AM     No components found for: GPT        Impression / Plan:        ICD-10-CM ICD-9-CM    1. Essential hypertension  I10 401.9 lisinopriL (PRINIVIL, ZESTRIL) 20 mg tablet      2. Left foot pain  M79.672 729.5       3.  Pre-op evaluation  A22.847 H16.03 METABOLIC PANEL, COMPREHENSIVE      CBC WITH AUTOMATED DIFF      XR CHEST PA LAT AMB POC EKG ROUTINE W/ 12 LEADS, INTER & REP      METABOLIC PANEL, COMPREHENSIVE      CBC WITH AUTOMATED DIFF        Await chest x-ray and lab results    EKG does not show any evidence of pathological Q waves, ST-T changes    Hypertension-blood pressure elevated, will increase the dose of lisinopril to 20 mg daily. Explained to patient risk benefits of the medications. Advised patient to stop meds if having any side effects. Pt verbalized understanding of the instructions. I have discussed the diagnosis with the patient and the intended plan as seen in the above orders. The patient has received an after-visit summary and questions were answered concerning future plans. I have discussed medication side effects and warnings with the patient as well. I have reviewed the plan of care with the patient, accepted their input and they are in agreement with the treatment goals. Reviewed plan of care. Patient has provided input and agrees with goals. Follow-up and Dispositions    Return in about 4 months (around 3/9/2023).          Ronit Wong MD

## 2022-11-10 ENCOUNTER — TELEPHONE (OUTPATIENT)
Dept: PRIMARY CARE CLINIC | Age: 72
End: 2022-11-10

## 2022-11-10 LAB
ALBUMIN SERPL-MCNC: 4.7 G/DL (ref 3.8–4.8)
ALBUMIN/GLOB SERPL: 1.9 {RATIO} (ref 1.2–2.2)
ALP SERPL-CCNC: 49 IU/L (ref 44–121)
ALT SERPL-CCNC: 10 IU/L (ref 0–44)
AST SERPL-CCNC: 22 IU/L (ref 0–40)
BASOPHILS # BLD AUTO: 0.1 X10E3/UL (ref 0–0.2)
BASOPHILS NFR BLD AUTO: 1 %
BILIRUB SERPL-MCNC: 0.6 MG/DL (ref 0–1.2)
BUN SERPL-MCNC: 13 MG/DL (ref 8–27)
BUN/CREAT SERPL: 14 (ref 10–24)
CALCIUM SERPL-MCNC: 9.5 MG/DL (ref 8.6–10.2)
CHLORIDE SERPL-SCNC: 101 MMOL/L (ref 96–106)
CO2 SERPL-SCNC: 25 MMOL/L (ref 20–29)
CREAT SERPL-MCNC: 0.93 MG/DL (ref 0.76–1.27)
EGFR: 88 ML/MIN/1.73
EOSINOPHIL # BLD AUTO: 0.2 X10E3/UL (ref 0–0.4)
EOSINOPHIL NFR BLD AUTO: 3 %
ERYTHROCYTE [DISTWIDTH] IN BLOOD BY AUTOMATED COUNT: 12.4 % (ref 11.6–15.4)
GLOBULIN SER CALC-MCNC: 2.5 G/DL (ref 1.5–4.5)
GLUCOSE SERPL-MCNC: 87 MG/DL (ref 70–99)
HCT VFR BLD AUTO: 40.5 % (ref 37.5–51)
HGB BLD-MCNC: 13.8 G/DL (ref 13–17.7)
IMM GRANULOCYTES # BLD AUTO: 0 X10E3/UL (ref 0–0.1)
IMM GRANULOCYTES NFR BLD AUTO: 0 %
LYMPHOCYTES # BLD AUTO: 2.1 X10E3/UL (ref 0.7–3.1)
LYMPHOCYTES NFR BLD AUTO: 37 %
MCH RBC QN AUTO: 32.9 PG (ref 26.6–33)
MCHC RBC AUTO-ENTMCNC: 34.1 G/DL (ref 31.5–35.7)
MCV RBC AUTO: 97 FL (ref 79–97)
MONOCYTES # BLD AUTO: 0.7 X10E3/UL (ref 0.1–0.9)
MONOCYTES NFR BLD AUTO: 12 %
NEUTROPHILS # BLD AUTO: 2.7 X10E3/UL (ref 1.4–7)
NEUTROPHILS NFR BLD AUTO: 47 %
PLATELET # BLD AUTO: 247 X10E3/UL (ref 150–450)
POTASSIUM SERPL-SCNC: 4.5 MMOL/L (ref 3.5–5.2)
PROT SERPL-MCNC: 7.2 G/DL (ref 6–8.5)
RBC # BLD AUTO: 4.19 X10E6/UL (ref 4.14–5.8)
SODIUM SERPL-SCNC: 142 MMOL/L (ref 134–144)
WBC # BLD AUTO: 5.6 X10E3/UL (ref 3.4–10.8)

## 2022-11-22 ENCOUNTER — APPOINTMENT (OUTPATIENT)
Dept: GENERAL RADIOLOGY | Age: 72
DRG: 281 | End: 2022-11-22
Attending: STUDENT IN AN ORGANIZED HEALTH CARE EDUCATION/TRAINING PROGRAM
Payer: COMMERCIAL

## 2022-11-22 ENCOUNTER — HOSPITAL ENCOUNTER (INPATIENT)
Age: 72
LOS: 2 days | Discharge: HOME | DRG: 281 | End: 2022-11-24
Attending: STUDENT IN AN ORGANIZED HEALTH CARE EDUCATION/TRAINING PROGRAM | Admitting: FAMILY MEDICINE
Payer: COMMERCIAL

## 2022-11-22 ENCOUNTER — APPOINTMENT (OUTPATIENT)
Dept: CT IMAGING | Age: 72
DRG: 281 | End: 2022-11-22
Attending: FAMILY MEDICINE
Payer: COMMERCIAL

## 2022-11-22 DIAGNOSIS — I21.4 NON-STEMI (NON-ST ELEVATED MYOCARDIAL INFARCTION) (HCC): ICD-10-CM

## 2022-11-22 DIAGNOSIS — I21.4 NSTEMI (NON-ST ELEVATED MYOCARDIAL INFARCTION) (HCC): Primary | ICD-10-CM

## 2022-11-22 PROBLEM — R07.9 CHEST PAIN: Status: ACTIVE | Noted: 2022-11-22

## 2022-11-22 LAB
ALBUMIN SERPL-MCNC: 3.4 G/DL (ref 3.5–5)
ALBUMIN/GLOB SERPL: 0.9 {RATIO} (ref 1.1–2.2)
ALP SERPL-CCNC: 46 U/L (ref 45–117)
ALT SERPL-CCNC: 15 U/L (ref 12–78)
ANION GAP SERPL CALC-SCNC: 4 MMOL/L (ref 5–15)
APTT PPP: 27.3 SEC (ref 22.1–31)
AST SERPL-CCNC: 12 U/L (ref 15–37)
BASOPHILS # BLD: 0 K/UL (ref 0–0.1)
BASOPHILS NFR BLD: 1 % (ref 0–1)
BILIRUB SERPL-MCNC: 0.6 MG/DL (ref 0.2–1)
BUN SERPL-MCNC: 11 MG/DL (ref 6–20)
BUN/CREAT SERPL: 13 (ref 12–20)
CALCIUM SERPL-MCNC: 8.5 MG/DL (ref 8.5–10.1)
CHLORIDE SERPL-SCNC: 107 MMOL/L (ref 97–108)
CO2 SERPL-SCNC: 29 MMOL/L (ref 21–32)
COMMENT, HOLDF: NORMAL
COMMENT, HOLDF: NORMAL
CREAT SERPL-MCNC: 0.88 MG/DL (ref 0.7–1.3)
D DIMER PPP FEU-MCNC: 5.62 MG/L FEU (ref 0–0.65)
DIFFERENTIAL METHOD BLD: NORMAL
EOSINOPHIL # BLD: 0.1 K/UL (ref 0–0.4)
EOSINOPHIL NFR BLD: 2 % (ref 0–7)
ERYTHROCYTE [DISTWIDTH] IN BLOOD BY AUTOMATED COUNT: 12 % (ref 11.5–14.5)
GLOBULIN SER CALC-MCNC: 3.7 G/DL (ref 2–4)
GLUCOSE SERPL-MCNC: 105 MG/DL (ref 65–100)
HCT VFR BLD AUTO: 40.3 % (ref 36.6–50.3)
HGB BLD-MCNC: 13.8 G/DL (ref 12.1–17)
IMM GRANULOCYTES # BLD AUTO: 0 K/UL (ref 0–0.04)
IMM GRANULOCYTES NFR BLD AUTO: 0 % (ref 0–0.5)
LYMPHOCYTES # BLD: 1 K/UL (ref 0.8–3.5)
LYMPHOCYTES NFR BLD: 18 % (ref 12–49)
MAGNESIUM SERPL-MCNC: 2 MG/DL (ref 1.6–2.4)
MAGNESIUM SERPL-MCNC: 2.2 MG/DL (ref 1.6–2.4)
MCH RBC QN AUTO: 33.4 PG (ref 26–34)
MCHC RBC AUTO-ENTMCNC: 34.2 G/DL (ref 30–36.5)
MCV RBC AUTO: 97.6 FL (ref 80–99)
MONOCYTES # BLD: 0.6 K/UL (ref 0–1)
MONOCYTES NFR BLD: 11 % (ref 5–13)
NEUTS SEG # BLD: 3.6 K/UL (ref 1.8–8)
NEUTS SEG NFR BLD: 68 % (ref 32–75)
NRBC # BLD: 0 K/UL (ref 0–0.01)
NRBC BLD-RTO: 0 PER 100 WBC
PLATELET # BLD AUTO: 254 K/UL (ref 150–400)
PMV BLD AUTO: 9.4 FL (ref 8.9–12.9)
POTASSIUM SERPL-SCNC: 3.6 MMOL/L (ref 3.5–5.1)
PROT SERPL-MCNC: 7.1 G/DL (ref 6.4–8.2)
RBC # BLD AUTO: 4.13 M/UL (ref 4.1–5.7)
SAMPLES BEING HELD,HOLD: NORMAL
SAMPLES BEING HELD,HOLD: NORMAL
SODIUM SERPL-SCNC: 140 MMOL/L (ref 136–145)
THERAPEUTIC RANGE,PTTT: NORMAL SECS (ref 58–77)
TROPONIN-HIGH SENSITIVITY: 178 NG/L (ref 0–76)
TSH SERPL DL<=0.05 MIU/L-ACNC: 1.26 UIU/ML (ref 0.36–3.74)
UFH PPP CHRO-ACNC: 0.77 IU/ML
WBC # BLD AUTO: 5.3 K/UL (ref 4.1–11.1)

## 2022-11-22 PROCEDURE — 74011250636 HC RX REV CODE- 250/636: Performed by: FAMILY MEDICINE

## 2022-11-22 PROCEDURE — 83735 ASSAY OF MAGNESIUM: CPT

## 2022-11-22 PROCEDURE — 85379 FIBRIN DEGRADATION QUANT: CPT

## 2022-11-22 PROCEDURE — 74011000636 HC RX REV CODE- 636: Performed by: STUDENT IN AN ORGANIZED HEALTH CARE EDUCATION/TRAINING PROGRAM

## 2022-11-22 PROCEDURE — 85730 THROMBOPLASTIN TIME PARTIAL: CPT

## 2022-11-22 PROCEDURE — 71046 X-RAY EXAM CHEST 2 VIEWS: CPT

## 2022-11-22 PROCEDURE — 93005 ELECTROCARDIOGRAM TRACING: CPT

## 2022-11-22 PROCEDURE — 85025 COMPLETE CBC W/AUTO DIFF WBC: CPT

## 2022-11-22 PROCEDURE — 84484 ASSAY OF TROPONIN QUANT: CPT

## 2022-11-22 PROCEDURE — 74011250636 HC RX REV CODE- 250/636: Performed by: STUDENT IN AN ORGANIZED HEALTH CARE EDUCATION/TRAINING PROGRAM

## 2022-11-22 PROCEDURE — 85520 HEPARIN ASSAY: CPT

## 2022-11-22 PROCEDURE — 99285 EMERGENCY DEPT VISIT HI MDM: CPT

## 2022-11-22 PROCEDURE — 80053 COMPREHEN METABOLIC PANEL: CPT

## 2022-11-22 PROCEDURE — 71275 CT ANGIOGRAPHY CHEST: CPT

## 2022-11-22 PROCEDURE — 65270000046 HC RM TELEMETRY

## 2022-11-22 PROCEDURE — 36415 COLL VENOUS BLD VENIPUNCTURE: CPT

## 2022-11-22 PROCEDURE — 74011000250 HC RX REV CODE- 250: Performed by: FAMILY MEDICINE

## 2022-11-22 PROCEDURE — 84443 ASSAY THYROID STIM HORMONE: CPT

## 2022-11-22 RX ORDER — NITROGLYCERIN 0.4 MG/1
0.4 TABLET SUBLINGUAL
Status: DISCONTINUED | OUTPATIENT
Start: 2022-11-22 | End: 2022-11-24 | Stop reason: HOSPADM

## 2022-11-22 RX ORDER — HEPARIN SODIUM 10000 [USP'U]/100ML
12-25 INJECTION, SOLUTION INTRAVENOUS
Status: DISCONTINUED | OUTPATIENT
Start: 2022-11-22 | End: 2022-11-22

## 2022-11-22 RX ORDER — DILTIAZEM HYDROCHLORIDE 120 MG/1
120 CAPSULE, COATED, EXTENDED RELEASE ORAL DAILY
Status: DISCONTINUED | OUTPATIENT
Start: 2022-11-23 | End: 2022-11-23

## 2022-11-22 RX ORDER — SODIUM CHLORIDE 0.9 % (FLUSH) 0.9 %
5-40 SYRINGE (ML) INJECTION EVERY 8 HOURS
Status: DISCONTINUED | OUTPATIENT
Start: 2022-11-22 | End: 2022-11-24 | Stop reason: HOSPADM

## 2022-11-22 RX ORDER — HEPARIN SODIUM 1000 [USP'U]/ML
4000 INJECTION, SOLUTION INTRAVENOUS; SUBCUTANEOUS ONCE
Status: COMPLETED | OUTPATIENT
Start: 2022-11-22 | End: 2022-11-22

## 2022-11-22 RX ORDER — SODIUM CHLORIDE 9 MG/ML
75 INJECTION, SOLUTION INTRAVENOUS CONTINUOUS
Status: DISCONTINUED | OUTPATIENT
Start: 2022-11-22 | End: 2022-11-24 | Stop reason: HOSPADM

## 2022-11-22 RX ORDER — HEPARIN SODIUM 5000 [USP'U]/ML
5000 INJECTION, SOLUTION INTRAVENOUS; SUBCUTANEOUS EVERY 8 HOURS
Status: CANCELLED | OUTPATIENT
Start: 2022-11-22

## 2022-11-22 RX ORDER — SODIUM CHLORIDE 0.9 % (FLUSH) 0.9 %
5-40 SYRINGE (ML) INJECTION AS NEEDED
Status: DISCONTINUED | OUTPATIENT
Start: 2022-11-22 | End: 2022-11-24 | Stop reason: HOSPADM

## 2022-11-22 RX ORDER — CLINDAMYCIN HYDROCHLORIDE 300 MG/1
300 CAPSULE ORAL 4 TIMES DAILY
COMMUNITY
Start: 2022-11-17 | End: 2022-11-26

## 2022-11-22 RX ORDER — LISINOPRIL 10 MG/1
20 TABLET ORAL DAILY
Status: DISCONTINUED | OUTPATIENT
Start: 2022-11-23 | End: 2022-11-23

## 2022-11-22 RX ORDER — TRAMADOL HYDROCHLORIDE 50 MG/1
50 TABLET ORAL
COMMUNITY

## 2022-11-22 RX ORDER — BUPRENORPHINE HYDROCHLORIDE AND NALOXONE HYDROCHLORIDE DIHYDRATE 8; 2 MG/1; MG/1
0.5 TABLET SUBLINGUAL EVERY EVENING
COMMUNITY

## 2022-11-22 RX ORDER — HEPARIN SODIUM 10000 [USP'U]/100ML
12-25 INJECTION, SOLUTION INTRAVENOUS
Status: DISCONTINUED | OUTPATIENT
Start: 2022-11-22 | End: 2022-11-23

## 2022-11-22 RX ORDER — THERA TABS 400 MCG
1 TAB ORAL DAILY
Status: DISCONTINUED | OUTPATIENT
Start: 2022-11-23 | End: 2022-11-24 | Stop reason: HOSPADM

## 2022-11-22 RX ORDER — BUPRENORPHINE HYDROCHLORIDE AND NALOXONE HYDROCHLORIDE DIHYDRATE 8; 2 MG/1; MG/1
1 TABLET SUBLINGUAL EVERY MORNING
COMMUNITY

## 2022-11-22 RX ORDER — ASPIRIN 325 MG
325 TABLET ORAL DAILY
Status: CANCELLED | OUTPATIENT
Start: 2022-11-23

## 2022-11-22 RX ADMIN — HEPARIN SODIUM 12 UNITS/KG/HR: 10000 INJECTION, SOLUTION INTRAVENOUS at 16:31

## 2022-11-22 RX ADMIN — IOPAMIDOL 100 ML: 755 INJECTION, SOLUTION INTRAVENOUS at 18:36

## 2022-11-22 RX ADMIN — SODIUM CHLORIDE 75 ML/HR: 9 INJECTION, SOLUTION INTRAVENOUS at 18:13

## 2022-11-22 RX ADMIN — HEPARIN SODIUM 4000 UNITS: 1000 INJECTION INTRAVENOUS; SUBCUTANEOUS at 16:28

## 2022-11-22 RX ADMIN — SODIUM CHLORIDE, PRESERVATIVE FREE 10 ML: 5 INJECTION INTRAVENOUS at 23:59

## 2022-11-22 NOTE — ED PROVIDER NOTES
Patient is a 11year-old male presented ED with substernal chest pain that he had upon waking this morning. Pain doing somewhat better now. Patient did take 325 of aspirin at home. Patient history of hypertension high cholesterol. Family history of heart disease in older family members but nobody of young age. Patient with recent foot surgery of the left foot. This does put him at risk for PE. Vital signs stable. No hypoxia, tachycardia. The history is provided by the patient, the spouse and medical records. Chest Pain   This is a new problem. The current episode started 6 to 12 hours ago. The problem has been gradually improving. The problem occurs constantly. The pain is associated with normal activity. The pain is present in the substernal region. The pain is at a severity of 5/10. The pain is moderate. The quality of the pain is described as pressure-like and sharp. The pain does not radiate. Associated symptoms include malaise/fatigue and palpitations. Pertinent negatives include no abdominal pain and no shortness of breath. He has tried rest (Aspirin) for the symptoms. The treatment provided moderate relief. Risk factors include hypertension, male gender, recent surgery and dyslipidemia. His past medical history does not include DVT or PE. Pertinent negatives include no cardiac catheterization and no cardiac stents.      Past Medical History:   Diagnosis Date    Hypertension     PUD (peptic ulcer disease)     Seizures ([address]     as a child-none since ~1976       Past Surgical History:   Procedure Laterality Date    HX APPENDECTOMY      HX GI      surgical repair of bleeding ulcer    HX GI      colon resection    HX HEENT      T&A    HX KNEE ARTHROSCOPY  1996    right knee    HX ORTHOPAEDIC  2003 & 2005    left knee         Family History:   Problem Relation Age of Onset    Heart Disease Mother     Diabetes Father     Diabetes Sister     Diabetes Paternal Grandmother        Social History Socioeconomic History    Marital status:      Spouse name: Not on file    Number of children: Not on file    Years of education: Not on file    Highest education level: Not on file   Occupational History    Not on file   Tobacco Use    Smoking status: Former     Packs/day: 0.25     Years: 42.00     Pack years: 10.50     Types: Cigarettes     Start date: 1968     Quit date: 2010     Years since quittin.4    Smokeless tobacco: Never    Tobacco comments:     quit May 2010   Vaping Use    Vaping Use: Never used   Substance and Sexual Activity    Alcohol use: Not Currently     Alcohol/week: 0.8 standard drinks     Types: 1 Cans of beer per week    Drug use: No     Types: Heroin     Comment: quit     Sexual activity: Not Currently   Other Topics Concern     Service Not Asked    Blood Transfusions Not Asked    Caffeine Concern Not Asked    Occupational Exposure Not Asked    Hobby Hazards Not Asked    Sleep Concern Not Asked    Stress Concern Not Asked    Weight Concern Not Asked    Special Diet Not Asked    Back Care Not Asked    Exercise Not Asked    Bike Helmet Not Asked    Seat Belt Not Asked    Self-Exams Not Asked   Social History Narrative    Not on file     Social Determinants of Health     Financial Resource Strain: Low Risk     Difficulty of Paying Living Expenses: Not hard at all   Food Insecurity: No Food Insecurity    Worried About Running Out of Food in the Last Year: Never true    Ran Out of Food in the Last Year: Never true   Transportation Needs: Not on file   Physical Activity: Not on file   Stress: Not on file   Social Connections: Not on file   Intimate Partner Violence: Not on file   Housing Stability: Not on file         ALLERGIES: Patient has no known allergies. Review of Systems   Constitutional:  Positive for malaise/fatigue. HENT: Negative. Eyes: Negative. Respiratory: Negative. Negative for shortness of breath.     Cardiovascular:  Positive for chest pain and palpitations. Gastrointestinal: Negative. Negative for abdominal pain. Endocrine: Negative. Genitourinary: Negative. Musculoskeletal: Negative. Skin: Negative. Allergic/Immunologic: Negative. Neurological: Negative. Hematological: Negative. Psychiatric/Behavioral: Negative. Vitals:    11/22/22 1349 11/22/22 1350   BP: (!) 136/100 (!) 137/102   Pulse: 83 80   Resp: 12 17   Temp:  98.6 °F (37 °C)   SpO2:  96%            Physical Exam  Vitals and nursing note reviewed. Constitutional:       General: He is not in acute distress. Appearance: Normal appearance. He is not ill-appearing. HENT:      Head: Normocephalic and atraumatic. Right Ear: External ear normal.      Left Ear: External ear normal.      Nose: Nose normal.   Eyes:      Extraocular Movements: Extraocular movements intact. Conjunctiva/sclera: Conjunctivae normal.   Cardiovascular:      Rate and Rhythm: Normal rate. Pulses: Normal pulses. Radial pulses are 2+ on the right side and 2+ on the left side. Heart sounds: Normal heart sounds. Pulmonary:      Effort: Pulmonary effort is normal.      Breath sounds: Normal breath sounds. Chest:      Chest wall: No deformity or tenderness. Abdominal:      General: Abdomen is flat. There is no distension. Tenderness: There is no abdominal tenderness. Musculoskeletal:         General: No deformity or signs of injury. Normal range of motion. Cervical back: Normal range of motion and neck supple. No tenderness. Skin:     General: Skin is warm and dry. Capillary Refill: Capillary refill takes less than 2 seconds. Neurological:      General: No focal deficit present. Mental Status: He is alert and oriented to person, place, and time.    Psychiatric:         Attention and Perception: Attention normal.         Mood and Affect: Mood normal.         Behavior: Behavior normal.        [facility]    ED Course as of 11/22/22 30 [facility] Nov 22, 2022   1511 EKG interpretation:   Rhythm: normal sinus rhythm and 1st degree block; and regular . Rate (approx.): 85; Axis: normal; Intervals: normal ; ST/T wave: normal; EKG documented and interpreted by Dale Lara. Kris Johnson MD, Emergency Medicine.     [AL]      ED Course User Index  [AL] Benigno Sanford MD       LABORATORY RESULTS:  Labs Reviewed   METABOLIC PANEL, COMPREHENSIVE - Abnormal; Notable for the following components:       Result Value    Anion gap 4 (*)     Glucose 105 (*)     AST (SGOT) 12 (*)     Albumin 3.4 (*)     A-G Ratio 0.9 (*)     All other components within normal limits   TROPONIN-HIGH SENSITIVITY - Abnormal; Notable for the following components:    Troponin-High Sensitivity 178 (*)     All other components within normal limits   D DIMER - Abnormal; Notable for the following components:    D-dimer 5.62 (*)     All other components within normal limits   CBC WITH AUTOMATED DIFF   SAMPLES BEING HELD   MAGNESIUM   D DIMER   MAGNESIUM   PTT   UNFRACTIONATED AND LMW HEPARIN   CBC WITH AUTOMATED DIFF   UNFRACTIONATED AND LMW HEPARIN   SAMPLE TO BLOOD BANK       IMAGING RESULTS:  XR CHEST PA LAT   Final Result   No acute cardiopulmonary process. MEDICATIONS GIVEN:  Medications   heparin (porcine) 1,000 unit/mL injection 4,000 Units (has no administration in time range)   heparin 25,000 units in D5W 250 ml infusion (has no administration in time range)       Differential diagnosis: ACS, PE, chest pain    ED physician interpretation of EKG: No STEMI. See my interpretation EKG in ED course above. ED physician interpretation of laboratory results: Elevated troponin 178 with normal kidney function. Suspect ACS/NSTEMI. D-dimer pending but PE less likely based on patient's clinical picture, no leg swelling. MDM: Patient is a 81-year-old male presented to ED with pain that started this morning. Pain mostly improved upon arrival in the ED.   Without the troponin mild chest pain present will admit for NSTEMI. Starting heparin. DISPOSITION: Admitted    Perfect Serve Consult for Admission  3:18 PM    ED Room Number: ER10/10  Patient Name and age:  John Robert. 81 y.o.  male  Working Diagnosis:   1. NSTEMI (non-ST elevated myocardial infarction) ([facility] [address]        COVID-19 Suspicion:  no  Sepsis present:  no  Reassessment needed: no  Code Status:  Full Code  Readmission: no  Isolation Requirements:  no  Recommended Level of Care:  telemetry  Department: [facility] Adult ED - 21     Other: Chest pain with likely NSTEMI. Anthony Flores.  Alberto Jauregui MD        Procedures

## 2022-11-22 NOTE — H&P
[address] [facility]  [facility]ist [facility]  History and Physical    Date of Service:  11/22/2022  Primary Care Provider: Joseph Stewart MD  Source of information: The patient, Chart review, and Spouse/family member    Chief Complaint: Chest Pain      History of Presenting Illness:   Tyson Pabon. is a 81 y.o. male who presents with Chest pain. Patient presents to ER with chest pain, reports that the pain started earlier this morning, he takes aspirin on a daily basis, continued to have pain, got concerned and came to ER, recently went for surgery on his left foot, came to the ER and was aggressively admitted to the hospitalist service      The patient denies any headache, blurry vision, sore throat, trouble swallowing, trouble with speech,  SOB, cough, fever, chills, N/V/D, abd pain, urinary symptoms, constipation, recent travels, sick contacts, focal or generalized neurological symptoms, falls, injuries, rashes, contact with COVID-19 diagnosed patients, hematemesis, melena, hemoptysis, hematuria, rashes, denies starting any new medications and denies any other concerns or problems besides as mentioned above. REVIEW OF SYSTEMS:  A comprehensive review of systems was negative except for that written in the History of Present Illness. Past Medical History:   Diagnosis Date    Hypertension     PUD (peptic ulcer disease)     Seizures ([facility] [address]     as a child-none since ~1976      Past Surgical History:   Procedure Laterality Date    HX APPENDECTOMY      HX GI      surgical repair of bleeding ulcer    HX GI      colon resection    HX HEENT      T&A    HX KNEE ARTHROSCOPY  1996    right knee    HX ORTHOPAEDIC  2003 & 2005    left knee     Prior to Admission medications    Medication Sig Start Date End Date Taking? Authorizing Provider   lisinopriL (PRINIVIL, ZESTRIL) 20 mg tablet Take 1 Tablet by mouth daily.  11/9/22   Joseph Stewart MD   fluticasone propionate (FLONASE) 50 mcg/actuation nasal spray SPRAY 2 SPRAYS INTO EACH NOSTRIL EVERY DAY AS NEEDED 9/16/22   Dennys Morrow MD   dilTIAZem ER (CARDIZEM CD) 120 mg capsule Take 1 Capsule by mouth daily. 6/14/22   Dennys Morrow MD   multivitamin (ONE A DAY) tablet Take 1 Tab by mouth daily. Provider, Historical     No Known Allergies   Family History   Problem Relation Age of Onset    Heart Disease Mother     Diabetes Father     Diabetes Sister     Diabetes Paternal Grandmother       Social History:  reports that he quit smoking about 12 years ago. His smoking use included cigarettes. He started smoking about 54 years ago. He has a 10.50 pack-year smoking history. He has never used smokeless tobacco. He reports that he does not currently use alcohol after a past usage of about 0.8 standard drinks per week. He reports that he does not use drugs. Family and social history were personally reviewed, all pertinent and relevant details are outlined as above. Objective:   Visit Vitals  BP (!) 137/102 (BP 1 Location: Left upper arm, BP Patient Position: At rest)   Pulse 80   Temp 98.6 °F (37 °C)   Resp 17   Ht 5' 10\" (1.778 m)   Wt 80.7 kg (178 lb)   SpO2 96%   BMI 25.54 kg/m²      O2 Device: None (Room air)    PHYSICAL EXAM:   General: Alert x oriented x 3, awake,  HEENT: PEERL, EOMI, moist mucus membranes  Neck: Supple, no JVD, no meningeal signs  Chest: Clear to auscultation bilaterally   CVS: RRR, S1 S2 heard, no murmurs/rubs/gallops  Abd: Soft, non-tender, non-distended, +bowel sounds   Ext: No clubbing, no cyanosis, no edema  Neuro/Psych: Pleasant mood and affect, CN 2-12 grossly intact,  Cap refill: Brisk, less than 3 seconds  Pulses: 2+, symmetric in all extremities  Skin: Warm, dry, without rashes or lesions    Data Review: All diagnostic labs and studies have been reviewed.     Abnormal Labs Reviewed   METABOLIC PANEL, COMPREHENSIVE - Abnormal; Notable for the following components:       Result Value    Anion gap 4 (*)     Glucose 105 (*)     AST (SGOT) 12 (*)     Albumin 3.4 (*)     A-G Ratio 0.9 (*)     All other components within normal limits   TROPONIN-HIGH SENSITIVITY - Abnormal; Notable for the following components:    Troponin-High Sensitivity 178 (*)     All other components within normal limits   D DIMER - Abnormal; Notable for the following components:    D-dimer 5.62 (*)     All other components within normal limits       All Micro Results       None            IMAGING:   XR CHEST PA LAT   Final Result   No acute cardiopulmonary process. ECG/ECHO:    Results for orders placed or performed during the hospital encounter of 11/22/22   EKG, 12 LEAD, INITIAL   Result Value Ref Range    Ventricular Rate 85 BPM    Atrial Rate 85 BPM    P-R Interval 234 ms    QRS Duration 88 ms    Q-T Interval 358 ms    QTC Calculation (Bezet) 426 ms    Calculated P Axis 35 degrees    Calculated R Axis -12 degrees    Calculated T Axis 8 degrees    Diagnosis       Sinus rhythm with 1st degree AV block  Otherwise normal ECG  When compared with ECG of 08-FEB-2017 05:08,  [place] interval has increased  Criteria for Inferior infarct are no longer present          Assessment:   Given the patient's current clinical presentation, there is a high level of concern for decompensation if discharged from the emergency department. Complex decision making was performed, which includes reviewing the patient's available past medical records, laboratory results, and imaging studies.     Non-STEMI  Hypertension  Plan:     Patient will be admitted on telemetry bed, patient on heparin GTT, aspirin, statin, concern for pulm embolism, no CT PE obtained in the ER, will order CTA of the chest, IV hydration, cardiology consult, cycle troponins, telemetry monitoring, echocardiogram, cardiology consult, further intervention per hospital course  Optimize blood pressure control, continue to monitor        DIET: ADULT DIET Regular; 4 carb choices (60 gm/meal); Low Fat/Low Chol/High Fiber/RENETTA; Low Sodium (2 gm); Low Potassium (Less than 3000 mg/day)  DIET NPO   ISOLATION PRECAUTIONS: There are currently no Active Isolations  CODE STATUS: Full Code   DVT PROPHYLAXIS: Heparin  FUNCTIONAL STATUS PRIOR TO HOSPITALIZATION: Fully active and ambulatory; able to carry on all self-care without restriction. Ambulatory status/function: By self   EARLY MOBILITY ASSESSMENT: Recommend routine ambulation while hospitalized with the assistance of nursing staff  ANTICIPATED DISCHARGE: 24-48 hours. ANTICIPATED DISPOSITION: Home with Home Healthcare    CRITICAL CARE WAS PERFORMED FOR THIS ENCOUNTER: YES. I had a face to face encounter with the patient, reviewed and interpreted patient data including clinical events, labs, images, vital signs, I/O's, and examined patient. I have discussed the case and the plan and management of the patient's care with the consulting services, the bedside nurses and necessary ancillary providers. This patient has a high probability of imminent, clinically significant deterioration, which requires the highest level of preparedness to intervene urgently. I participated in the decision-making and personally managed or directed the management of the following life and organ supporting interventions that required my frequent assessment to treat or prevent imminent deterioration. I personally spent 45 minutes of critical care time. This is time spent at this critically ill patient's bedside actively involved in patient care as well as the coordination of care and discussions with the patient's family. This does not include any procedural time which has been billed separately. Signed By: Cheko Jolly MD     November 22, 2022         Please note that this dictation may have been completed with Dragon, the [employer] voice recognition software.   Quite often unanticipated grammatical, syntax, homophones, and other interpretive errors are inadvertently transcribed by the computer software. Please disregard these errors. Please excuse any errors that have escaped final proofreading.

## 2022-11-23 ENCOUNTER — APPOINTMENT (OUTPATIENT)
Dept: NON INVASIVE DIAGNOSTICS | Age: 72
DRG: 281 | End: 2022-11-23
Attending: FAMILY MEDICINE
Payer: COMMERCIAL

## 2022-11-23 ENCOUNTER — TRANSCRIBE ORDER (OUTPATIENT)
Dept: CARDIAC REHAB | Age: 72
End: 2022-11-23

## 2022-11-23 DIAGNOSIS — I21.4 ACUTE MYOCARDIAL INFARCTION, SUBENDOCARDIAL INFARCTION, INITIAL EPISODE OF CARE (HCC): Primary | ICD-10-CM

## 2022-11-23 LAB
ALBUMIN SERPL-MCNC: 3.2 G/DL (ref 3.5–5)
ALBUMIN/GLOB SERPL: 0.9 {RATIO} (ref 1.1–2.2)
ALP SERPL-CCNC: 44 U/L (ref 45–117)
ALT SERPL-CCNC: 17 U/L (ref 12–78)
ANION GAP SERPL CALC-SCNC: 6 MMOL/L (ref 5–15)
AST SERPL-CCNC: 34 U/L (ref 15–37)
ATRIAL RATE: 71 BPM
ATRIAL RATE: 85 BPM
BASOPHILS # BLD: 0.1 K/UL (ref 0–0.1)
BASOPHILS NFR BLD: 1 % (ref 0–1)
BILIRUB SERPL-MCNC: 0.6 MG/DL (ref 0.2–1)
BUN SERPL-MCNC: 10 MG/DL (ref 6–20)
BUN/CREAT SERPL: 12 (ref 12–20)
CALCIUM SERPL-MCNC: 8.9 MG/DL (ref 8.5–10.1)
CALCULATED P AXIS, ECG09: 35 DEGREES
CALCULATED P AXIS, ECG09: 84 DEGREES
CALCULATED R AXIS, ECG10: -12 DEGREES
CALCULATED R AXIS, ECG10: -24 DEGREES
CALCULATED T AXIS, ECG11: 13 DEGREES
CALCULATED T AXIS, ECG11: 8 DEGREES
CHLORIDE SERPL-SCNC: 105 MMOL/L (ref 97–108)
CHOLEST SERPL-MCNC: 210 MG/DL
CO2 SERPL-SCNC: 28 MMOL/L (ref 21–32)
COMMENT, HOLDF: NORMAL
CREAT SERPL-MCNC: 0.83 MG/DL (ref 0.7–1.3)
DIAGNOSIS, 93000: NORMAL
DIAGNOSIS, 93000: NORMAL
DIFFERENTIAL METHOD BLD: ABNORMAL
ECHO AO ROOT DIAM: 3.2 CM
ECHO AO ROOT INDEX: 1.61 CM/M2
ECHO AR MAX VEL PISA: 4.2 M/S
ECHO AV AREA PEAK VELOCITY: 2.2 CM2
ECHO AV AREA/BSA PEAK VELOCITY: 1.1 CM2/M2
ECHO AV PEAK GRADIENT: 4 MMHG
ECHO AV PEAK VELOCITY: 1 M/S
ECHO AV REGURGITANT PHT: 1292.9 MILLISECOND
ECHO AV VELOCITY RATIO: 0.7
ECHO EST RA PRESSURE: 3 MMHG
ECHO LA DIAMETER INDEX: 1.61 CM/M2
ECHO LA DIAMETER: 3.2 CM
ECHO LA TO AORTIC ROOT RATIO: 1
ECHO LV E' SEPTAL VELOCITY: 4 CM/S
ECHO LV FRACTIONAL SHORTENING: 38 % (ref 28–44)
ECHO LV INTERNAL DIMENSION DIASTOLE INDEX: 2.91 CM/M2
ECHO LV INTERNAL DIMENSION DIASTOLIC: 5.8 CM (ref 4.2–5.9)
ECHO LV INTERNAL DIMENSION SYSTOLIC INDEX: 1.81 CM/M2
ECHO LV INTERNAL DIMENSION SYSTOLIC: 3.6 CM
ECHO LV IVSD: 0.9 CM (ref 0.6–1)
ECHO LV MASS 2D: 136 G (ref 88–224)
ECHO LV MASS INDEX 2D: 68.4 G/M2 (ref 49–115)
ECHO LV POSTERIOR WALL DIASTOLIC: 0.4 CM (ref 0.6–1)
ECHO LV RELATIVE WALL THICKNESS RATIO: 0.14
ECHO LVOT AREA: 3.5 CM2
ECHO LVOT DIAM: 2.1 CM
ECHO LVOT PEAK GRADIENT: 2 MMHG
ECHO LVOT PEAK VELOCITY: 0.7 M/S
ECHO MV A VELOCITY: 0.5 M/S
ECHO MV E VELOCITY: 0.36 M/S
ECHO MV E/A RATIO: 0.72
ECHO MV E/E' SEPTAL: 9
ECHO MV REGURGITANT PEAK GRADIENT: 8 MMHG
ECHO MV REGURGITANT PEAK VELOCITY: 1.4 M/S
ECHO PV MAX VELOCITY: 0.6 M/S
ECHO PV PEAK GRADIENT: 1 MMHG
ECHO RIGHT VENTRICULAR SYSTOLIC PRESSURE (RVSP): 5 MMHG
ECHO RV FREE WALL PEAK S': 17 CM/S
ECHO RV TAPSE: 2.2 CM (ref 1.7–?)
ECHO TV REGURGITANT MAX VELOCITY: 0.72 M/S
ECHO TV REGURGITANT PEAK GRADIENT: 2 MMHG
EOSINOPHIL # BLD: 0.2 K/UL (ref 0–0.4)
EOSINOPHIL NFR BLD: 3 % (ref 0–7)
ERYTHROCYTE [DISTWIDTH] IN BLOOD BY AUTOMATED COUNT: 11.9 % (ref 11.5–14.5)
GLOBULIN SER CALC-MCNC: 3.7 G/DL (ref 2–4)
GLUCOSE SERPL-MCNC: 99 MG/DL (ref 65–100)
HCT VFR BLD AUTO: 38 % (ref 36.6–50.3)
HDLC SERPL-MCNC: 111 MG/DL
HDLC SERPL: 1.9 {RATIO} (ref 0–5)
HGB BLD-MCNC: 13.1 G/DL (ref 12.1–17)
IMM GRANULOCYTES # BLD AUTO: 0 K/UL (ref 0–0.04)
IMM GRANULOCYTES NFR BLD AUTO: 0 % (ref 0–0.5)
LDLC SERPL CALC-MCNC: 91.8 MG/DL (ref 0–100)
LYMPHOCYTES # BLD: 1.9 K/UL (ref 0.8–3.5)
LYMPHOCYTES NFR BLD: 33 % (ref 12–49)
MCH RBC QN AUTO: 32.9 PG (ref 26–34)
MCHC RBC AUTO-ENTMCNC: 34.5 G/DL (ref 30–36.5)
MCV RBC AUTO: 95.5 FL (ref 80–99)
MONOCYTES # BLD: 0.6 K/UL (ref 0–1)
MONOCYTES NFR BLD: 11 % (ref 5–13)
NEUTS SEG # BLD: 3 K/UL (ref 1.8–8)
NEUTS SEG NFR BLD: 52 % (ref 32–75)
NRBC # BLD: 0 K/UL (ref 0–0.01)
NRBC BLD-RTO: 0 PER 100 WBC
P-R INTERVAL, ECG05: 232 MS
P-R INTERVAL, ECG05: 234 MS
PLATELET # BLD AUTO: 247 K/UL (ref 150–400)
PMV BLD AUTO: 9.4 FL (ref 8.9–12.9)
POTASSIUM SERPL-SCNC: 3.8 MMOL/L (ref 3.5–5.1)
PROT SERPL-MCNC: 6.9 G/DL (ref 6.4–8.2)
Q-T INTERVAL, ECG07: 358 MS
Q-T INTERVAL, ECG07: 402 MS
QRS DURATION, ECG06: 88 MS
QRS DURATION, ECG06: 94 MS
QTC CALCULATION (BEZET), ECG08: 426 MS
QTC CALCULATION (BEZET), ECG08: 436 MS
RBC # BLD AUTO: 3.98 M/UL (ref 4.1–5.7)
SAMPLES BEING HELD,HOLD: NORMAL
SODIUM SERPL-SCNC: 139 MMOL/L (ref 136–145)
TRIGL SERPL-MCNC: 36 MG/DL (ref ?–150)
TROPONIN-HIGH SENSITIVITY: 5131 NG/L (ref 0–76)
TROPONIN-HIGH SENSITIVITY: 6547 NG/L (ref 0–76)
UFH PPP CHRO-ACNC: 0.33 IU/ML
UFH PPP CHRO-ACNC: 0.39 IU/ML
VENTRICULAR RATE, ECG03: 71 BPM
VENTRICULAR RATE, ECG03: 85 BPM
VLDLC SERPL CALC-MCNC: 7.2 MG/DL
WBC # BLD AUTO: 5.7 K/UL (ref 4.1–11.1)

## 2022-11-23 PROCEDURE — 77030016699 HC CATH ANGI DX INFN1 CARD -A: Performed by: INTERNAL MEDICINE

## 2022-11-23 PROCEDURE — 65270000046 HC RM TELEMETRY

## 2022-11-23 PROCEDURE — 77030040934 HC CATH DIAG DXTERITY MEDT -A: Performed by: INTERNAL MEDICINE

## 2022-11-23 PROCEDURE — 93306 TTE W/DOPPLER COMPLETE: CPT

## 2022-11-23 PROCEDURE — 74011000636 HC RX REV CODE- 636: Performed by: INTERNAL MEDICINE

## 2022-11-23 PROCEDURE — C1769 GUIDE WIRE: HCPCS | Performed by: INTERNAL MEDICINE

## 2022-11-23 PROCEDURE — 74011250637 HC RX REV CODE- 250/637: Performed by: HOSPITALIST

## 2022-11-23 PROCEDURE — 77030012206: Performed by: INTERNAL MEDICINE

## 2022-11-23 PROCEDURE — B2111ZZ FLUOROSCOPY OF MULTIPLE CORONARY ARTERIES USING LOW OSMOLAR CONTRAST: ICD-10-PCS | Performed by: INTERNAL MEDICINE

## 2022-11-23 PROCEDURE — 74011250636 HC RX REV CODE- 250/636: Performed by: HOSPITALIST

## 2022-11-23 PROCEDURE — C1894 INTRO/SHEATH, NON-LASER: HCPCS | Performed by: INTERNAL MEDICINE

## 2022-11-23 PROCEDURE — 84484 ASSAY OF TROPONIN QUANT: CPT

## 2022-11-23 PROCEDURE — 85025 COMPLETE CBC W/AUTO DIFF WBC: CPT

## 2022-11-23 PROCEDURE — 80061 LIPID PANEL: CPT

## 2022-11-23 PROCEDURE — 74011000250 HC RX REV CODE- 250: Performed by: INTERNAL MEDICINE

## 2022-11-23 PROCEDURE — 74011000250 HC RX REV CODE- 250: Performed by: FAMILY MEDICINE

## 2022-11-23 PROCEDURE — 93306 TTE W/DOPPLER COMPLETE: CPT | Performed by: SPECIALIST

## 2022-11-23 PROCEDURE — 4A023N7 MEASUREMENT OF CARDIAC SAMPLING AND PRESSURE, LEFT HEART, PERCUTANEOUS APPROACH: ICD-10-PCS | Performed by: INTERNAL MEDICINE

## 2022-11-23 PROCEDURE — 74011250636 HC RX REV CODE- 250/636: Performed by: FAMILY MEDICINE

## 2022-11-23 PROCEDURE — 74011250636 HC RX REV CODE- 250/636: Performed by: INTERNAL MEDICINE

## 2022-11-23 PROCEDURE — 77030013744: Performed by: INTERNAL MEDICINE

## 2022-11-23 PROCEDURE — 99152 MOD SED SAME PHYS/QHP 5/>YRS: CPT | Performed by: INTERNAL MEDICINE

## 2022-11-23 PROCEDURE — 36415 COLL VENOUS BLD VENIPUNCTURE: CPT

## 2022-11-23 PROCEDURE — 85520 HEPARIN ASSAY: CPT

## 2022-11-23 PROCEDURE — 80053 COMPREHEN METABOLIC PANEL: CPT

## 2022-11-23 PROCEDURE — 93005 ELECTROCARDIOGRAM TRACING: CPT

## 2022-11-23 PROCEDURE — 93458 L HRT ARTERY/VENTRICLE ANGIO: CPT | Performed by: INTERNAL MEDICINE

## 2022-11-23 PROCEDURE — 99153 MOD SED SAME PHYS/QHP EA: CPT | Performed by: INTERNAL MEDICINE

## 2022-11-23 PROCEDURE — 74011250637 HC RX REV CODE- 250/637: Performed by: INTERNAL MEDICINE

## 2022-11-23 RX ORDER — MIDAZOLAM HYDROCHLORIDE 1 MG/ML
INJECTION, SOLUTION INTRAMUSCULAR; INTRAVENOUS AS NEEDED
Status: DISCONTINUED | OUTPATIENT
Start: 2022-11-23 | End: 2022-11-23 | Stop reason: HOSPADM

## 2022-11-23 RX ORDER — METOPROLOL SUCCINATE 50 MG/1
50 TABLET, EXTENDED RELEASE ORAL DAILY
Status: DISCONTINUED | OUTPATIENT
Start: 2022-11-23 | End: 2022-11-24 | Stop reason: HOSPADM

## 2022-11-23 RX ORDER — FENTANYL CITRATE 50 UG/ML
INJECTION, SOLUTION INTRAMUSCULAR; INTRAVENOUS AS NEEDED
Status: DISCONTINUED | OUTPATIENT
Start: 2022-11-23 | End: 2022-11-23 | Stop reason: HOSPADM

## 2022-11-23 RX ORDER — BUPRENORPHINE HYDROCHLORIDE AND NALOXONE HYDROCHLORIDE DIHYDRATE 8; 2 MG/1; MG/1
1 TABLET SUBLINGUAL EVERY MORNING
Status: DISCONTINUED | OUTPATIENT
Start: 2022-11-23 | End: 2022-11-24 | Stop reason: HOSPADM

## 2022-11-23 RX ORDER — LIDOCAINE HYDROCHLORIDE 10 MG/ML
INJECTION INFILTRATION; PERINEURAL AS NEEDED
Status: DISCONTINUED | OUTPATIENT
Start: 2022-11-23 | End: 2022-11-23 | Stop reason: HOSPADM

## 2022-11-23 RX ORDER — GUAIFENESIN 100 MG/5ML
81 LIQUID (ML) ORAL DAILY
Status: DISCONTINUED | OUTPATIENT
Start: 2022-11-24 | End: 2022-11-24 | Stop reason: HOSPADM

## 2022-11-23 RX ORDER — BUPRENORPHINE HYDROCHLORIDE AND NALOXONE HYDROCHLORIDE DIHYDRATE 8; 2 MG/1; MG/1
0.5 TABLET SUBLINGUAL EVERY EVENING
Status: DISCONTINUED | OUTPATIENT
Start: 2022-11-23 | End: 2022-11-24 | Stop reason: HOSPADM

## 2022-11-23 RX ORDER — VERAPAMIL HYDROCHLORIDE 2.5 MG/ML
INJECTION, SOLUTION INTRAVENOUS AS NEEDED
Status: DISCONTINUED | OUTPATIENT
Start: 2022-11-23 | End: 2022-11-23 | Stop reason: HOSPADM

## 2022-11-23 RX ORDER — SODIUM CHLORIDE 0.9 % (FLUSH) 0.9 %
5-40 SYRINGE (ML) INJECTION AS NEEDED
Status: DISCONTINUED | OUTPATIENT
Start: 2022-11-23 | End: 2022-11-24 | Stop reason: HOSPADM

## 2022-11-23 RX ORDER — ATORVASTATIN CALCIUM 10 MG/1
10 TABLET, FILM COATED ORAL DAILY
Status: DISCONTINUED | OUTPATIENT
Start: 2022-11-24 | End: 2022-11-24 | Stop reason: HOSPADM

## 2022-11-23 RX ORDER — SODIUM CHLORIDE 0.9 % (FLUSH) 0.9 %
5-40 SYRINGE (ML) INJECTION EVERY 8 HOURS
Status: DISCONTINUED | OUTPATIENT
Start: 2022-11-23 | End: 2022-11-24 | Stop reason: HOSPADM

## 2022-11-23 RX ORDER — LISINOPRIL 10 MG/1
20 TABLET ORAL DAILY
Status: DISCONTINUED | OUTPATIENT
Start: 2022-11-23 | End: 2022-11-24 | Stop reason: HOSPADM

## 2022-11-23 RX ADMIN — SODIUM CHLORIDE 75 ML/HR: 9 INJECTION, SOLUTION INTRAVENOUS at 05:43

## 2022-11-23 RX ADMIN — LISINOPRIL 20 MG: 10 TABLET ORAL at 14:39

## 2022-11-23 RX ADMIN — SODIUM CHLORIDE, PRESERVATIVE FREE 10 ML: 5 INJECTION INTRAVENOUS at 21:45

## 2022-11-23 RX ADMIN — SODIUM CHLORIDE, PRESERVATIVE FREE 10 ML: 5 INJECTION INTRAVENOUS at 05:28

## 2022-11-23 RX ADMIN — METOPROLOL SUCCINATE 50 MG: 50 TABLET, EXTENDED RELEASE ORAL at 11:59

## 2022-11-23 RX ADMIN — BUPRENORPHINE HYDROCHLORIDE AND NALOXONE HYDROCHLORIDE DIHYDRATE 1 TABLET: 8; 2 TABLET SUBLINGUAL at 14:40

## 2022-11-23 RX ADMIN — BUPRENORPHINE HYDROCHLORIDE AND NALOXONE HYDROCHLORIDE DIHYDRATE 0.5 TABLET: 8; 2 TABLET SUBLINGUAL at 20:49

## 2022-11-23 NOTE — PROGRESS NOTES
Transfer to [address] [place] [address] from Procedure Area    Verbal report given to Kevin on [facility]. being transferred to Cardiac Cath Lab  for routine progression of care   Patient is post [facility] procedure. Patient stable upon transfer to . Report consisted of patients Situation, Background, Assessment and   Recommendations(SBAR). Information from the following report(s) Procedure Summary, MAR, and Recent Results was reviewed with the receiving nurse. Opportunity for questions and clarification was provided. Patient medicated during procedure with orders obtained and verified by Dr. Marj Chapman. Refer to patient PROCEDURE REPORT for vital signs, assessment, status, and response during procedure.

## 2022-11-23 NOTE — PROGRESS NOTES
TRANSFER - IN REPORT:    Verbal report received from Elisabeth Licona RN(name) on [facility].  being received from [facility](unit) for ordered procedure      Report consisted of patients Situation, Background, Assessment and   Recommendations(SBAR). Information from the following report(s) SBAR, MAR, and Recent Results was reviewed with the receiving nurse. Opportunity for questions and clarification was provided. Assessment completed upon patients arrival to unit and care assumed.

## 2022-11-23 NOTE — PROGRESS NOTES
Admission Medication Reconciliation:    Information obtained from:  Patient, RxQuery  RxQuery data available¹:  YES    Comments/Recommendations: Updated PTA meds/reviewed patient's allergies. 1)  Spoke to patient at bedside who is a good historian, able to name his medication names/frequencies/doses (with minor prompting)    2) Reports taking Suboxone 8-2mg tablets, 1 tab in the AM and 0.5 tab in the evenings. Reports having been started on Clindamycin 300mg on 2022 for \"his surgery\" that took place recently - RxQuery says prescription was filled as 1 cap q6h, but he reports taking 1 cap BID instead     3)  Medication changes (since last review): Added  - Suboxone 8-2mg tablets, 1 tab in the AM, 0.5 tab in the PM  - Clindamycin (-22)  - PRN tramadol     Adjusted  - n/a    Removed  - n/a     ¹RxQuery pharmacy benefit data reflects medications filled and processed through the patient's insurance, however   this data does NOT capture whether the medication was picked up or is currently being taken by the patient. Allergies:  Patient has no known allergies. Significant PMH/Disease States:   Past Medical History:   Diagnosis Date    Hypertension     PUD (peptic ulcer disease)     Seizures ([facility] [address]     as a child-none since ~     Chief Complaint for this Admission:    Chief Complaint   Patient presents with    Chest Pain     Prior to Admission Medications:   Prior to Admission Medications   Prescriptions Last Dose Informant Taking? buprenorphine-naloxone 8-2 mg subl   Yes   Si Tablet by SubLINGual route Every morning. buprenorphine-naloxone 8-2 mg subl   Yes   Si.5 Tablets by SubLINGual route every evening. clindamycin (CLEOCIN) 300 mg capsule   Yes   Sig: Take 300 mg by mouth four (4) times daily. Patient reports taking BID  Indications: post-op abx   dilTIAZem ER (CARDIZEM CD) 120 mg capsule   No   Sig: Take 1 Capsule by mouth daily.    fluticasone propionate (FLONASE) 50 mcg/actuation nasal spray   No   Sig: SPRAY 2 SPRAYS INTO EACH NOSTRIL EVERY DAY AS NEEDED   lisinopriL (PRINIVIL, ZESTRIL) 20 mg tablet   No   Sig: Take 1 Tablet by mouth daily. multivitamin (ONE A DAY) tablet   No   Sig: Take 1 Tab by mouth daily. traMADoL (ULTRAM) 50 mg tablet   Yes   Sig: Take 50 mg by mouth every six (6) hours as needed for Pain. Facility-Administered Medications: None     Please contact the main inpatient pharmacy with any questions or concerns at [phone] and we will direct you to the clinical pharmacist covering this patient's care while in-house.    Rudy Queen, PHARMD

## 2022-11-23 NOTE — PROGRESS NOTES
Cardiac Cath Lab Procedure Area Arrival Note:    Asher Norwood Jr. arrived to Cardiac Cath Lab, Procedure Area. Patient identifiers verified with NAME and DATE OF BIRTH. Procedure verified with patient. Consent forms verified. Allergies verified. Patient informed of procedure and plan of care. Questions answered with review. Patient voiced understanding of procedure and plan of care. Patient on cardiac monitor, non-invasive blood pressure, SPO2 monitor. On RA . IV of NS on pump at 50 ml/hr. Patient status doing well without problems. Patient is A&Ox 4. Patient reports chest tightness 7/10. Patient medicated during procedure with orders obtained and verified by Dr. Ed Boudreaux. Refer to patients Cardiac Cath Lab PROCEDURE REPORT for vital signs, assessment, status, and response during procedure, printed at end of case. Printed report on chart or scanned into chart.

## 2022-11-23 NOTE — PROGRESS NOTES
Bedside and Verbal shift change report given to Damien Ramirez RN (oncoming nurse) by Adrienne Franklin (offgoing nurse). Report included the following information SBAR and Kardex.

## 2022-11-23 NOTE — CONSULTS
Cardiology Consult Note    CC: CP  Reason for consult:  NSTEMI  Requesting MD:  Jovanna Giles     Subjective:      Date of  Admission: 11/22/2022  1:41 PM     Admission type:Emergency    Sharrell Ganser. Lincoln Ziegler is a 81 y.o. male admitted for Chest pain [R07.9]. Patient complains of CP; SS location with SOB off and on for last year or so but much worse over last week. He came to hospital as he felt bad; no nausea or sweats or palpitations. I was asked see him by hospitalist as his troponins still rising and he is still symptomatic. No prior MI, CHF, stent, or PAF. His mother had CAD.     Patient Active Problem List    Diagnosis Date Noted    Chest pain 11/22/2022    VITALIY on CPAP 11/09/2022    Hepatitis C antibody positive in blood 09/21/2021    Hepatitis C virus infection cured after antiviral drug therapy 09/21/2021    H/O gastric ulcer 02/09/2017    Vitamin D deficiency 05/19/2015    Cervical pain (neck) 03/13/2015    Encounter for monitoring Suboxone maintenance therapy 08/03/2011    H/O partial resection of colon 08/27/2010    Hypertension 08/27/2010      Kirk Burt MD  Past Medical History:   Diagnosis Date    Hypertension     PUD (peptic ulcer disease)     Seizures ([address]     as a child-none since ~1976      Past Surgical History:   Procedure Laterality Date    HX APPENDECTOMY      HX GI      surgical repair of bleeding ulcer    HX GI      colon resection    HX HEENT      T&A    HX KNEE ARTHROSCOPY  1996    right knee    HX ORTHOPAEDIC  2003 & 2005    left knee     No Known Allergies   Family History   Problem Relation Age of Onset    Heart Disease Mother     Diabetes Father     Diabetes Sister     Diabetes Paternal Grandmother       Current Facility-Administered Medications   Medication Dose Route Frequency    dilTIAZem ER (CARDIZEM CD) capsule 120 mg  120 mg Oral DAILY    lisinopriL (PRINIVIL, ZESTRIL) tablet 20 mg  20 mg Oral DAILY    therapeutic multivitamin (THERAGRAN) tablet 1 Tablet  1 Tablet Oral DAILY sodium chloride (NS) flush 5-40 mL  5-40 mL IntraVENous Q8H    sodium chloride (NS) flush 5-40 mL  5-40 mL IntraVENous PRN    0.9% sodium chloride infusion  75 mL/hr IntraVENous CONTINUOUS    nitroglycerin (NITROSTAT) tablet 0.4 mg  0.4 mg SubLINGual Q5MIN PRN        Prior to Admission Medications:  Prior to Admission medications    Medication Sig Start Date End Date Taking? Authorizing Provider   buprenorphine-naloxone 8-2 mg subl 1 Tablet by SubLINGual route Every morning. Yes Provider, Historical   buprenorphine-naloxone 8-2 mg subl 0.5 Tablets by SubLINGual route every evening. Yes Provider, Historical   traMADoL (ULTRAM) 50 mg tablet Take 50 mg by mouth every six (6) hours as needed for Pain. Yes Provider, Historical   clindamycin (CLEOCIN) 300 mg capsule Take 300 mg by mouth four (4) times daily. Patient reports taking BID  Indications: post-op abx 22 Yes Provider, Historical   lisinopriL (PRINIVIL, ZESTRIL) 20 mg tablet Take 1 Tablet by mouth daily. 22   Ayala Matute MD   fluticasone propionate (FLONASE) 50 mcg/actuation nasal spray SPRAY 2 SPRAYS INTO EACH NOSTRIL EVERY DAY AS NEEDED 22   Ayala Matute MD   dilTIAZem ER (CARDIZEM CD) 120 mg capsule Take 1 Capsule by mouth daily. 22   Ayala Matute MD   multivitamin (ONE A DAY) tablet Take 1 Tab by mouth daily. Provider, Historical        Review of Symptoms:  Except as noted in HPI, patient denies recent fever or chills, nausea, vomiting, diarrhea, hemoptysis, hematemesis, dysuria, myalgias, focal neurologic symptoms, ecchymosis, angioedema, odynophagia, dysphagia, sore throat, earache,rash, melena, hematochezia, depression, GERD, cold intolerance, petechia, bleeding gums, or significant weight loss. A comprehensive review of systems was negative except for that written in the HPI.      Subjective:    24 hr VS reviewed, overall VSSAF  Temp (24hrs), Av.2 °F (36.8 °C), Min:97.8 °F (36.6 °C), Max:98.6 °F (37 °C)    Patient Vitals for the past 8 hrs:   Pulse   11/23/22 0917 83   11/23/22 0850 89   11/23/22 0600 68   11/23/22 0500 67   11/23/22 0400 61   11/23/22 0300 71   11/23/22 0200 66    Patient Vitals for the past 8 hrs:   Resp   11/23/22 0850 17   11/23/22 0500 16    Patient Vitals for the past 8 hrs:   BP   11/23/22 0917 (!) 142/78   11/23/22 0850 (!) 181/116   11/23/22 0500 (!) 150/98   11/23/22 0400 (!) 139/99   11/23/22 0300 (!) 150/110   11/23/22 0200 (!) 150/108        No intake or output data in the 24 hours ending 11/23/22 0922      Physical Exam (complete single organ system exam)    Visit Vitals  BP (!) 142/78 (BP 1 Location: Left upper arm, BP Patient Position: At rest;Supine)   Pulse 83   Temp 97.8 °F (36.6 °C)   Resp 17   Ht 5' 10\" (1.778 m)   Wt 178 lb (80.7 kg)   SpO2 94%   BMI 25.54 kg/m²     General Appearance:  Well developed, well nourished,alert and oriented x 3, and individual in no acute distress. Ears/Nose/Mouth/Throat:   Hearing grossly normal.         Neck: Supple. Chest:   Lungs clear to auscultation bilaterally. Cardiovascular:  Regular rate and rhythm, S1, S2 normal, no murmur. Abdomen:   Soft, non-tender, bowel sounds are active. Extremities: No edema bilaterally. Skin: Warm and dry.                Cardiographics    Telemetry: normal sinus rhythm  ECG: normal EKG, normal sinus rhythm, unchanged from previous tracings  Echocardiogram: Not done    Labs:   Recent Results (from the past 24 hour(s))   EKG, 12 LEAD, INITIAL    Collection Time: 11/22/22  1:35 PM   Result Value Ref Range    Ventricular Rate 85 BPM    Atrial Rate 85 BPM    P-R Interval 234 ms    QRS Duration 88 ms    Q-T Interval 358 ms    QTC Calculation (Bezet) 426 ms    Calculated P Axis 35 degrees    Calculated R Axis -12 degrees    Calculated T Axis 8 degrees    Diagnosis       Sinus rhythm with 1st degree AV block  Otherwise normal ECG  When compared with ECG of 08-FEB-2017 05:08,  VA interval has increased  Criteria for Inferior infarct are no longer present     CBC WITH AUTOMATED DIFF    Collection Time: 11/22/22  2:06 PM   Result Value Ref Range    WBC 5.3 4.1 - 11.1 K/uL    RBC 4.13 4.10 - 5.70 M/uL    HGB 13.8 12.1 - 17.0 g/dL    HCT 40.3 36.6 - 50.3 %    MCV 97.6 80.0 - 99.0 FL    MCH 33.4 26.0 - 34.0 PG    MCHC 34.2 30.0 - 36.5 g/dL    RDW 12.0 11.5 - 14.5 %    PLATELET 638 098 - 865 K/uL    MPV 9.4 8.9 - 12.9 FL    NRBC 0.0 0  WBC    ABSOLUTE NRBC 0.00 0.00 - 0.01 K/uL    NEUTROPHILS 68 32 - 75 %    LYMPHOCYTES 18 12 - 49 %    MONOCYTES 11 5 - 13 %    EOSINOPHILS 2 0 - 7 %    BASOPHILS 1 0 - 1 %    IMMATURE GRANULOCYTES 0 0.0 - 0.5 %    ABS. NEUTROPHILS 3.6 1.8 - 8.0 K/UL    ABS. LYMPHOCYTES 1.0 0.8 - 3.5 K/UL    ABS. MONOCYTES 0.6 0.0 - 1.0 K/UL    ABS. EOSINOPHILS 0.1 0.0 - 0.4 K/UL    ABS. BASOPHILS 0.0 0.0 - 0.1 K/UL    ABS. IMM. GRANS. 0.0 0.00 - 0.04 K/UL    DF AUTOMATED     METABOLIC PANEL, COMPREHENSIVE    Collection Time: 11/22/22  2:06 PM   Result Value Ref Range    Sodium 140 136 - 145 mmol/L    Potassium 3.6 3.5 - 5.1 mmol/L    Chloride 107 97 - 108 mmol/L    CO2 29 21 - 32 mmol/L    Anion gap 4 (L) 5 - 15 mmol/L    Glucose 105 (H) 65 - 100 mg/dL    BUN 11 6 - 20 MG/DL    Creatinine 0.88 0.70 - 1.30 MG/DL    BUN/Creatinine ratio 13 12 - 20      eGFR >60 >60 ml/min/1.73m2    Calcium 8.5 8.5 - 10.1 MG/DL    Bilirubin, total 0.6 0.2 - 1.0 MG/DL    ALT (SGPT) 15 12 - 78 U/L    AST (SGOT) 12 (L) 15 - 37 U/L    Alk. phosphatase 46 45 - 117 U/L    Protein, total 7.1 6.4 - 8.2 g/dL    Albumin 3.4 (L) 3.5 - 5.0 g/dL    Globulin 3.7 2.0 - 4.0 g/dL    A-G Ratio 0.9 (L) 1.1 - 2.2     SAMPLES BEING HELD    Collection Time: 11/22/22  2:06 PM   Result Value Ref Range    SAMPLES BEING HELD 1BLU 1RED     COMMENT        Add-on orders for these samples will be processed based on acceptable specimen integrity and analyte stability, which may vary by analyte.    TROPONIN-HIGH SENSITIVITY Collection Time: 11/22/22  2:06 PM   Result Value Ref Range    Troponin-High Sensitivity 178 (HH) 0 - 76 ng/L   D DIMER    Collection Time: 11/22/22  2:06 PM   Result Value Ref Range    D-dimer 5.62 (H) 0.00 - 0.65 mg/L FEU   MAGNESIUM    Collection Time: 11/22/22  2:06 PM   Result Value Ref Range    Magnesium 2.0 1.6 - 2.4 mg/dL   MAGNESIUM    Collection Time: 11/22/22  2:06 PM   Result Value Ref Range    Magnesium 2.2 1.6 - 2.4 mg/dL   PTT    Collection Time: 11/22/22  2:06 PM   Result Value Ref Range    aPTT 27.3 22.1 - 31.0 sec    aPTT, therapeutic range     58.0 - 77.0 SECS   TSH 3RD GENERATION    Collection Time: 11/22/22  2:06 PM   Result Value Ref Range    TSH 1.26 0.36 - 3.74 uIU/mL   UNFRACTIONATED AND LMW HEPARIN    Collection Time: 11/22/22  5:18 PM   Result Value Ref Range    Heparin Xa,Unfrac. and LMW 0.77 IU/mL   SAMPLES BEING HELD    Collection Time: 11/22/22  6:00 PM   Result Value Ref Range    SAMPLES BEING HELD 1 PST 1 SST     COMMENT        Add-on orders for these samples will be processed based on acceptable specimen integrity and analyte stability, which may vary by analyte.    UNFRACTIONATED AND LMW HEPARIN    Collection Time: 11/23/22 12:31 AM   Result Value Ref Range    Heparin Xa,Unfrac. and LMW 0.39 IU/mL   TROPONIN-HIGH SENSITIVITY    Collection Time: 11/23/22 12:31 AM   Result Value Ref Range    Troponin-High Sensitivity 5,131 (HH) 0 - 76 ng/L   LIPID PANEL    Collection Time: 11/23/22 12:31 AM   Result Value Ref Range    Cholesterol, total 210 (H) <200 MG/DL    Triglyceride 36 <150 MG/DL    HDL Cholesterol 111 MG/DL    LDL, calculated 91.8 0 - 100 MG/DL    VLDL, calculated 7.2 MG/DL    CHOL/HDL Ratio 1.9 0.0 - 5.0     METABOLIC PANEL, COMPREHENSIVE    Collection Time: 11/23/22 12:36 AM   Result Value Ref Range    Sodium 139 136 - 145 mmol/L    Potassium 3.8 3.5 - 5.1 mmol/L    Chloride 105 97 - 108 mmol/L    CO2 28 21 - 32 mmol/L    Anion gap 6 5 - 15 mmol/L    Glucose 99 65 - 100 mg/dL BUN 10 6 - 20 MG/DL    Creatinine 0.83 0.70 - 1.30 MG/DL    BUN/Creatinine ratio 12 12 - 20      eGFR >60 >60 ml/min/1.73m2    Calcium 8.9 8.5 - 10.1 MG/DL    Bilirubin, total 0.6 0.2 - 1.0 MG/DL    ALT (SGPT) 17 12 - 78 U/L    AST (SGOT) 34 15 - 37 U/L    Alk. phosphatase 44 (L) 45 - 117 U/L    Protein, total 6.9 6.4 - 8.2 g/dL    Albumin 3.2 (L) 3.5 - 5.0 g/dL    Globulin 3.7 2.0 - 4.0 g/dL    A-G Ratio 0.9 (L) 1.1 - 2.2     CBC WITH AUTOMATED DIFF    Collection Time: 11/23/22 12:36 AM   Result Value Ref Range    WBC 5.7 4.1 - 11.1 K/uL    RBC 3.98 (L) 4.10 - 5.70 M/uL    HGB 13.1 12.1 - 17.0 g/dL    HCT 38.0 36.6 - 50.3 %    MCV 95.5 80.0 - 99.0 FL    MCH 32.9 26.0 - 34.0 PG    MCHC 34.5 30.0 - 36.5 g/dL    RDW 11.9 11.5 - 14.5 %    PLATELET 052 754 - 396 K/uL    MPV 9.4 8.9 - 12.9 FL    NRBC 0.0 0  WBC    ABSOLUTE NRBC 0.00 0.00 - 0.01 K/uL    NEUTROPHILS 52 32 - 75 %    LYMPHOCYTES 33 12 - 49 %    MONOCYTES 11 5 - 13 %    EOSINOPHILS 3 0 - 7 %    BASOPHILS 1 0 - 1 %    IMMATURE GRANULOCYTES 0 0.0 - 0.5 %    ABS. NEUTROPHILS 3.0 1.8 - 8.0 K/UL    ABS. LYMPHOCYTES 1.9 0.8 - 3.5 K/UL    ABS. MONOCYTES 0.6 0.0 - 1.0 K/UL    ABS. EOSINOPHILS 0.2 0.0 - 0.4 K/UL    ABS. BASOPHILS 0.1 0.0 - 0.1 K/UL    ABS. IMM.  GRANS. 0.0 0.00 - 0.04 K/UL    DF AUTOMATED     EKG, 12 LEAD, INITIAL    Collection Time: 11/23/22  1:41 AM   Result Value Ref Range    Ventricular Rate 71 BPM    Atrial Rate 71 BPM    P-R Interval 232 ms    QRS Duration 94 ms    Q-T Interval 402 ms    QTC Calculation (Bezet) 436 ms    Calculated P Axis 84 degrees    Calculated R Axis -24 degrees    Calculated T Axis 13 degrees    Diagnosis       Sinus rhythm with 1st degree AV block  Incomplete right bundle branch block  When compared with ECG of 22-NOV-2022 13:35,  MANUAL COMPARISON REQUIRED, DATA IS UNCONFIRMED     UNFRACTIONATED AND LMW HEPARIN    Collection Time: 11/23/22  5:25 AM   Result Value Ref Range    Heparin Xa,Unfrac. and LMW 0.33 IU/mL TROPONIN-HIGH SENSITIVITY    Collection Time: 11/23/22  5:25 AM   Result Value Ref Range    Troponin-High Sensitivity 6,547 (HH) 0 - 76 ng/L   SAMPLES BEING HELD    Collection Time: 11/23/22  5:25 AM   Result Value Ref Range    SAMPLES BEING HELD 1LAV     COMMENT        Add-on orders for these samples will be processed based on acceptable specimen integrity and analyte stability, which may vary by analyte.         Assessment:     Assessment:   CP; persistent  Elevated enzymes; consistent with NSTEMI  Family history of CAD      Plan:   Tele  To cath lab    Glo Cooley MD

## 2022-11-23 NOTE — PROGRESS NOTES
TRANSFER - IN REPORT:    Verbal report received from [facility] on Andres Soto. Selam Jimenez.  being received from cardiac catheterization for routine progression of care. Report consisted of patients Situation, Background, Assessment and Recommendations(SBAR). Information from the following report(s) Procedure Summary was reviewed with the receiving clinician. Opportunity for questions and clarification was provided. Assessment completed upon patients arrival to [address] [place] [address] and care assumed. Cardiac Cath Lab Recovery Arrival Note:    Eda Norwood Jr. arrived to [facility] recovery area. Patient procedure= cardiac catheterization. Patient on cardiac monitor, non-invasive blood pressure, SPO2 monitor. On room air. IV  capped. Patient status doing well without problems. Patient is A&Ox 4. Patient reports no pain. PROCEDURE SITE CHECK:    Procedure site:without any bleeding and no hematoma, no pain/discomfort reported at procedure site. No change in patient status. Continue to monitor patient and status. 0942 Patient took liquids without nausea and tolerated it well.

## 2022-11-24 VITALS
TEMPERATURE: 98.1 F | HEART RATE: 61 BPM | RESPIRATION RATE: 16 BRPM | SYSTOLIC BLOOD PRESSURE: 135 MMHG | WEIGHT: 171.08 LBS | BODY MASS INDEX: 24.49 KG/M2 | OXYGEN SATURATION: 94 % | HEIGHT: 70 IN | DIASTOLIC BLOOD PRESSURE: 95 MMHG

## 2022-11-24 PROCEDURE — 74011250636 HC RX REV CODE- 250/636: Performed by: HOSPITALIST

## 2022-11-24 PROCEDURE — 74011000250 HC RX REV CODE- 250: Performed by: FAMILY MEDICINE

## 2022-11-24 PROCEDURE — 74011250637 HC RX REV CODE- 250/637: Performed by: INTERNAL MEDICINE

## 2022-11-24 PROCEDURE — 74011000250 HC RX REV CODE- 250: Performed by: INTERNAL MEDICINE

## 2022-11-24 PROCEDURE — 74011250637 HC RX REV CODE- 250/637: Performed by: HOSPITALIST

## 2022-11-24 PROCEDURE — 74011250637 HC RX REV CODE- 250/637: Performed by: FAMILY MEDICINE

## 2022-11-24 RX ORDER — METOPROLOL SUCCINATE 50 MG/1
50 TABLET, EXTENDED RELEASE ORAL DAILY
Qty: 30 TABLET | Refills: 2 | Status: SHIPPED | OUTPATIENT
Start: 2022-11-24

## 2022-11-24 RX ORDER — ATORVASTATIN CALCIUM 10 MG/1
10 TABLET, FILM COATED ORAL DAILY
Qty: 30 TABLET | Refills: 2 | Status: SHIPPED | OUTPATIENT
Start: 2022-11-24

## 2022-11-24 RX ORDER — GUAIFENESIN 100 MG/5ML
81 LIQUID (ML) ORAL DAILY
Qty: 30 TABLET | Refills: 2 | Status: SHIPPED | OUTPATIENT
Start: 2022-11-24

## 2022-11-24 RX ADMIN — METOPROLOL SUCCINATE 50 MG: 50 TABLET, EXTENDED RELEASE ORAL at 08:55

## 2022-11-24 RX ADMIN — SODIUM CHLORIDE, PRESERVATIVE FREE 10 ML: 5 INJECTION INTRAVENOUS at 07:28

## 2022-11-24 RX ADMIN — ATORVASTATIN CALCIUM 10 MG: 10 TABLET, FILM COATED ORAL at 08:55

## 2022-11-24 RX ADMIN — ASPIRIN 81 MG 81 MG: 81 TABLET ORAL at 08:55

## 2022-11-24 RX ADMIN — THERA TABS 1 TABLET: TAB at 08:56

## 2022-11-24 RX ADMIN — BUPRENORPHINE HYDROCHLORIDE AND NALOXONE HYDROCHLORIDE DIHYDRATE 1 TABLET: 8; 2 TABLET SUBLINGUAL at 08:56

## 2022-11-24 NOTE — PROGRESS NOTES
0730: Bedside and Verbal shift change report given to [address] [place] [address] (oncoming nurse) by Ivone Enriquez (offgoing nurse). Report included the following information SBAR, Kardex, Intake/Output, MAR, and Recent Results. 1200: I have reviewed discharge instructions with the patient. The patient verbalized understanding. Discharge medications reviewed with patient and appropriate educational materials and side effects teaching were provided. Current Discharge Medication List        START taking these medications    Details   aspirin 81 mg chewable tablet Take 1 Tablet by mouth daily. Qty: 30 Tablet, Refills: 2  Start date: 11/24/2022      atorvastatin (LIPITOR) 10 mg tablet Take 1 Tablet by mouth daily. Qty: 30 Tablet, Refills: 2  Start date: 11/24/2022      metoprolol succinate (TOPROL-XL) 50 mg XL tablet Take 1 Tablet by mouth daily. Qty: 30 Tablet, Refills: 2  Start date: 11/24/2022           CONTINUE these medications which have NOT CHANGED    Details   !! buprenorphine-naloxone 8-2 mg subl 1 Tablet by SubLINGual route Every morning. !! buprenorphine-naloxone 8-2 mg subl 0.5 Tablets by SubLINGual route every evening. traMADoL (ULTRAM) 50 mg tablet Take 50 mg by mouth every six (6) hours as needed for Pain. clindamycin (CLEOCIN) 300 mg capsule Take 300 mg by mouth four (4) times daily. Patient reports taking BID  Indications: post-op abx      lisinopriL (PRINIVIL, ZESTRIL) 20 mg tablet Take 1 Tablet by mouth daily. Qty: 90 Tablet, Refills: 1    Associated Diagnoses: Essential hypertension      fluticasone propionate (FLONASE) 50 mcg/actuation nasal spray SPRAY 2 SPRAYS INTO EACH NOSTRIL EVERY DAY AS NEEDED  Qty: 1 Each, Refills: 2    Associated Diagnoses: Seasonal allergic rhinitis, unspecified trigger      multivitamin (ONE A DAY) tablet Take 1 Tab by mouth daily. !! - Potential duplicate medications found. Please discuss with provider.         STOP taking these medications       dilTIAZem ER (CARDIZEM CD) 120 mg capsule Comments:   Reason for Stopping:                   Problem: Pain  Goal: *Control of Pain  Outcome: Resolved/Met  Goal: *PALLIATIVE CARE:  Alleviation of Pain  Outcome: Resolved/Met     Problem: Patient Education: Go to Patient Education Activity  Goal: Patient/Family Education  Outcome: Resolved/Met     Problem: Falls - Risk of  Goal: *Absence of Falls  Description: Document Missy Fall Risk and appropriate interventions in the flowsheet.   Outcome: Resolved/Met     Problem: Patient Education: Go to Patient Education Activity  Goal: Patient/Family Education  Outcome: Resolved/Met

## 2022-11-24 NOTE — PROGRESS NOTES
[address] [facility] Adult  Hospitalist Group                                                                                          Hospitalist Progress Note  Faizan Sosa MD  Answering service: [phone] from in house phone        Date of Service:  2022  NAME:  Hetal Norwood Jr. :  1950  MRN:  [id number]  ADMISSION HISTORY   Patient presented from home with pmh of HTN, PUD with substernal chest pain. INTERVAL HISTORY/SUBJECTIVE   Follow up Chest pain  No more chest pain/sob. dizziness  He had surgery to the left foot recently. Assessment & Plan:     Non-STEMI  Mild cardiomyopathy. Echocardiogram showed EF of 45-50%. LHC showed one-vessel CAD mid RCA 60% stenosed, no intervention performed. Stress cardiomyopathy being considered. Hypertension  Dyslipidemia  --Continue lisinopril, Toprol-XL, low-dose aspirin,low-dose Lipitor          Code status: Full code  Prophylaxis: scd    Plan: Discharge home with outpatient follow up with Cardiology    Care Plan discussed with: Patient and his wife    Discharge planning/Anticipated Disposition/DILLON: home today     Hospital Problems  Date Reviewed: 2022            Codes Class Noted POA    Chest pain ICD-10-CM: R07.9  ICD-9-CM: 786.50  2022 Unknown           Review of Systems:   A comprehensive review of systems was negative except for that written in the HPI. Vital Signs:    Last 24hrs VS reviewed since prior progress note.  Most recent are:  Visit Vitals  BP (!) 135/95   Pulse 63   Temp 98.1 °F (36.7 °C)   Resp 16   Ht 5' 10\" (1.778 m)   Wt 77.6 kg (171 lb 1.2 oz)   SpO2 94%   BMI 24.55 kg/m²         Intake/Output Summary (Last 24 hours) at 2022 0848  Last data filed at 2022 6653  Gross per 24 hour   Intake 460 ml   Output 1200 ml   Net -740 ml          Physical Examination:     I had a face to face encounter with this patient and independently examined them on 2022 as outlined below:          Constitutional:  No acute distress, cooperative, pleasant      HENT:  Oral mucosa moist, oropharynx benign. Eyes: Pupils are symmetrical, equal and reactive. Anicteric sclera, no pallor. Resp: Oxygen requirement: On room air  Breathing comfortably without tachypnea or evidence of accessory muscle use. CTA bilaterally. No wheezing/rhonchi/rales. CV:  Regular rhythm, normal rate, no murmurs, gallops, rubs      GI: Nondistended abdomen. Normoactive bowel sounds. Soft,non tender. No appreciable hepatosplenomegaly. : No CVA or suprapubic tenderness      Musculoskeletal: Left foot bandaged. No edema     Skin: No rash, erythema, depigmentation. Neurologic: Mental status: Alert, orientated to place, person and time. Cranial nerves:CN II-XII reviewed, grossly intact    Motor exam: Moves all extremities symmetrically, no gross focal deficit. Data Review:    Review and/or order of clinical lab test  Review and/or order of tests in the radiology section of CPT  Review and/or order of tests in the medicine section of CPT      Labs:     Recent Labs     11/23/22  0036 11/22/22  1406   WBC 5.7 5.3   HGB 13.1 13.8   HCT 38.0 40.3    254       Recent Labs     11/23/22  0036 11/22/22  1406    140   K 3.8 3.6    107   CO2 28 29   BUN 10 11   CREA 0.83 0.88   GLU 99 105*   CA 8.9 8.5   MG  --  2.2  2.0       Recent Labs     11/23/22  0036 11/22/22  1406   ALT 17 15   AP 44* 46   TBILI 0.6 0.6   TP 6.9 7.1   ALB 3.2* 3.4*   GLOB 3.7 3.7       Recent Labs     11/22/22  1406   APTT 27.3        No results for input(s): FE, TIBC, PSAT, FERR in the last 72 hours. No results found for: FOL, RBCF   No results for input(s): PH, PCO2, PO2 in the last 72 hours. No results for input(s): CPK, CKNDX, TROIQ in the last 72 hours.     No lab exists for component: CPKMB  Lab Results   Component Value Date/Time    Cholesterol, total 210 (H) 11/23/2022 12:31 AM HDL Cholesterol 111 11/23/2022 12:31 AM    LDL, calculated 91.8 11/23/2022 12:31 AM    Triglyceride 36 11/23/2022 12:31 AM    CHOL/HDL Ratio 1.9 11/23/2022 12:31 AM     Lab Results   Component Value Date/Time    Glucose (POC) 99 02/08/2017 05:40 PM     Lab Results   Component Value Date/Time    Color Yellow 06/08/2017 09:25 AM    Appearance Clear 06/08/2017 09:25 AM    Specific gravity 1.027 08/20/2010 03:30 PM    pH (UA) 6.5 06/08/2017 09:25 AM    Protein NEGATIVE 08/20/2010 03:30 PM    Glucose NEGATIVE 08/20/2010 03:30 PM    Ketone Negative 06/08/2017 09:25 AM    Bilirubin Negative 06/08/2017 09:25 AM    Urobilinogen 1.0 08/20/2010 03:30 PM    Nitrites Negative 06/08/2017 09:25 AM    Leukocyte Esterase Negative 06/08/2017 09:25 AM    Epithelial cells 0-5 08/20/2010 03:30 PM    Bacteria Few 06/08/2017 09:25 AM    WBC 0-5 06/08/2017 09:25 AM    RBC 0-2 06/08/2017 09:25 AM         Medications Reviewed:     Current Facility-Administered Medications   Medication Dose Route Frequency    sodium chloride (NS) flush 5-40 mL  5-40 mL IntraVENous Q8H    sodium chloride (NS) flush 5-40 mL  5-40 mL IntraVENous PRN    metoprolol succinate (TOPROL-XL) XL tablet 50 mg  50 mg Oral DAILY    buprenorphine-naloxone (SUBOXONE) 8-2mg SL tablet  1 Tablet SubLINGual QAM    buprenorphine-naloxone (SUBOXONE) 8-2mg SL tablet  0.5 Tablet SubLINGual QPM    lisinopriL (PRINIVIL, ZESTRIL) tablet 20 mg  20 mg Oral DAILY    aspirin chewable tablet 81 mg  81 mg Oral DAILY    atorvastatin (LIPITOR) tablet 10 mg  10 mg Oral DAILY    therapeutic multivitamin (THERAGRAN) tablet 1 Tablet  1 Tablet Oral DAILY    sodium chloride (NS) flush 5-40 mL  5-40 mL IntraVENous Q8H    sodium chloride (NS) flush 5-40 mL  5-40 mL IntraVENous PRN    0.9% sodium chloride infusion  75 mL/hr IntraVENous CONTINUOUS    nitroglycerin (NITROSTAT) tablet 0.4 mg  0.4 mg SubLINGual Q5MIN PRN     ______________________________________________________________________  EXPECTED LENGTH OF STAY: 1d 19h  ACTUAL LENGTH OF STAY:          2                 Long Torrez MD

## 2022-11-24 NOTE — DISCHARGE SUMMARY
Discharge Summary       PATIENT ID: Hardy Norwood Jr. MRN: [id number]   YOB: 1950    DATE OF ADMISSION: 11/22/2022  1:41 PM    DATE OF DISCHARGE: 11/24/2022   PRIMARY CARE PROVIDER: Jade Dickerson MD     ATTENDING PHYSICIAN: Dr Tr Baxter  DISCHARGING PROVIDER: Tr Baxter MD    To contact this individual call [phone] and ask the  to page. If unavailable ask to be transferred the Adult Hospitalist Department. CONSULTATIONS: IP CONSULT TO HOSPITALIST  IP CONSULT TO CARDIOLOGY    PROCEDURES/SURGERIES: Procedure(s):  LEFT HEART CATH / CORONARY ANGIOGRAPHY    DISCHARGE DIAGNOSES:   Non-STEMI  Mild cardiomyopathy. Echocardiogram showed EF of 45-50%. LHC showed one-vessel CAD mid RCA 60% stenosed, no intervention performed. Stress cardiomyopathy being considered. Hypertension  Dyslipidemia  --Continue lisinopril, Toprol-XL, low-dose aspirin,low-dose Lipitor    ADDITIONAL CARE RECOMMENDATIONS:   Follow up with PMD  Follow up with Cardiology     NOTIFY YOUR PHYSICIAN FOR ANY OF THE FOLLOWING:   Fever over 101 degrees for 24 hours. Chest pain, shortness of breath, fever, chills, nausea, vomiting, diarrhea, change in mentation, falling, weakness, bleeding. Severe pain or pain not relieved by medications, as well as any other signs or symptoms that you may have questions about. FOLLOW UP APPOINTMENTS:    Follow-up Information       Follow up With Specialties Details Why Contact Info Additional Information    [address] Cardiac Rehabilitation Follow up cardiac rehab [address] [place] [address] [address], [address]. Please arrive 15 minutes prior to your appointment time and you will register in the [place] [address], [address], on the first floor of the [address]. Telephone: [phone] Fax: [phone] Driving directions To [facility] and [facility].  Building: Driving WEST on [address], take [address] to [employer]. Turn left onto [facility], then turn right into [employer] Parking lot Driving EAST on [address], take exit [address] [facility]. Turn right at the end of the exit ramp. Turn left onto [facility] [address], then turn right into [employer] lot. Gema Rouse MD Internal Medicine Physician Follow up in 1 week(s)  [facility]  [address] [facility]       Tennille Ryan MD Cardiovascular Disease Physician, Clinical Cardiac Electrophysiology Physician Follow up in 2 week(s)  [place] [address]  [address] [facility]  [phone]                  DIET: Cardiac Diet    ACTIVITY: Activity as tolerated    DISCHARGE MEDICATIONS:  Current Discharge Medication List        START taking these medications    Details   aspirin 81 mg chewable tablet Take 1 Tablet by mouth daily. Qty: 30 Tablet, Refills: 2  Start date: 11/24/2022      atorvastatin (LIPITOR) 10 mg tablet Take 1 Tablet by mouth daily. Qty: 30 Tablet, Refills: 2  Start date: 11/24/2022      metoprolol succinate (TOPROL-XL) 50 mg XL tablet Take 1 Tablet by mouth daily. Qty: 30 Tablet, Refills: 2  Start date: 11/24/2022           CONTINUE these medications which have NOT CHANGED    Details   !! buprenorphine-naloxone 8-2 mg subl 1 Tablet by SubLINGual route Every morning. !! buprenorphine-naloxone 8-2 mg subl 0.5 Tablets by SubLINGual route every evening. traMADoL (ULTRAM) 50 mg tablet Take 50 mg by mouth every six (6) hours as needed for Pain. clindamycin (CLEOCIN) 300 mg capsule Take 300 mg by mouth four (4) times daily. Patient reports taking BID  Indications: post-op abx      lisinopriL (PRINIVIL, ZESTRIL) 20 mg tablet Take 1 Tablet by mouth daily.   Qty: 90 Tablet, Refills: 1    Associated Diagnoses: Essential hypertension      fluticasone propionate (FLONASE) 50 mcg/actuation nasal spray SPRAY 2 SPRAYS INTO EACH NOSTRIL EVERY DAY AS NEEDED  Qty: 1 Each, Refills: 2    Associated Diagnoses: Seasonal allergic rhinitis, unspecified trigger      multivitamin (ONE A DAY) tablet Take 1 Tab by mouth daily. !! - Potential duplicate medications found. Please discuss with provider. STOP taking these medications       dilTIAZem ER (CARDIZEM CD) 120 mg capsule Comments:   Reason for Stopping:               DISPOSITION:   x Home With:   OT  PT  HH  RN       Long term SNF/Inpatient Rehab    Independent/assisted living    Hospice    Other:       PATIENT CONDITION AT DISCHARGE:     Functional status    Poor     Deconditioned    x Independent      Cognition    x Lucid     Forgetful     Dementia      Catheters/lines (plus indication)    [place]     PICC     PEG    x None      Code status    x Full code     DNR      PHYSICAL EXAMINATION AT DISCHARGE:  Please see progress note      CHRONIC MEDICAL DIAGNOSES:  Problem List as of 11/24/2022 Date Reviewed: 6/9/2022            Codes Class Noted - Resolved    Chest pain ICD-10-CM: R07.9  ICD-9-CM: 786.50  11/22/2022 - Present        VITALIY on CPAP ICD-10-CM: G47.33, Z99.89  ICD-9-CM: 327.23, V46.8  11/9/2022 - Present        Hepatitis C antibody positive in blood ICD-10-CM: R76.8  ICD-9-CM: 795.79  9/21/2021 - Present    Overview Signed 9/21/2021  9:42 AM by Adelaide Sanchez., RUY     Pt has been treated and cured of active HCV. This test will always be positive. Hepatitis C virus infection cured after antiviral drug therapy ICD-10-CM: Z86.19  ICD-9-CM: V12.09  9/21/2021 - Present    Overview Signed 9/21/2021  9:42 AM by Adelaide Sanchez., NP     Treated with 8 weeks of Mavyret, completed in 9/2020. He has achieved SVR.               H/O gastric ulcer ICD-10-CM: Z87.11  ICD-9-CM: V12.79  2/9/2017 - Present        Vitamin D deficiency ICD-10-CM: E55.9  ICD-9-CM: 268.9  5/19/2015 - Present        Cervical pain (neck) ICD-10-CM: M54.2  ICD-9-CM: 723.1  3/13/2015 - Present        Encounter for monitoring Suboxone maintenance therapy ICD-10-CM: Z51.81, Z79.899  ICD-9-CM: V58.83, V58.69  8/3/2011 - Present        H/O partial resection of colon ICD-10-CM: Z90.49  ICD-9-CM: V45.72  8/27/2010 - Present    Overview Signed 3/6/2020  8:58 AM by Alondra Pan MD     Due to diverticulitis and colon stricture             Hypertension ICD-10-CM: I10  ICD-9-CM: 401.9  8/27/2010 - Present           Greater than 36 minutes were spent with the patient on counseling and coordination of care    Signed:   Kiran Cervantes MD  11/24/2022  9:08 AM    .

## 2022-11-24 NOTE — PROGRESS NOTES
[facility] [facility] Adult  Hospitalist Group                                                                                          Hospitalist Progress Note  Maxi Rivera MD  Answering service: [phone] from in house phone        Date of Service:  2022  NAME:  Isaiah Norwood Jr. :  1950  MRN:  [id number]  ADMISSION HISTORY   Patient presented from home with substernal chest pain. History significant for hypertension hyperlipidemia. INTERVAL HISTORY/SUBJECTIVE   Patient seen examined this afternoon, he had cardiac cath early this morning. His wife present in room. Patient denies chest pressure or shortness of breath. He had surgery to the left foot recently. Assessment & Plan:     Non-STEMI  Mild cardiomyopathy. Echocardiogram showed EF of 45-50%. LHC showed one-vessel CAD mid RCA 60% stenosed, no intervention performed. Stress cardiomyopathy being considered. Hypertension  Dyslipidemia  --Continue lisinopril, Toprol-XL. Add low-dose aspirin. Add low-dose Lipitor          Code status: Full code  Prophylaxis: Lovenox    Care Plan discussed with: Patient and his wife    Discharge planning/Anticipated Disposition/DILLON: Likely home tomorrow     Hospital Problems  Date Reviewed: 2022            Codes Class Noted POA    Chest pain ICD-10-CM: R07.9  ICD-9-CM: 786.50  2022 Unknown             Review of Systems:   A comprehensive review of systems was negative except for that written in the HPI. Vital Signs:    Last 24hrs VS reviewed since prior progress note.  Most recent are:  Visit Vitals  /82   Pulse 77   Temp 97.8 °F (36.6 °C)   Resp 20   Ht 5' 10\" (1.778 m)   Wt 80.7 kg (177 lb 14.6 oz)   SpO2 97%   BMI 25.53 kg/m²         Intake/Output Summary (Last 24 hours) at 2022 1924  Last data filed at 2022 1852  Gross per 24 hour   Intake 460 ml   Output 1200 ml   Net -740 ml        Physical Examination:     I had a face to face encounter with this patient and independently examined them on 11/23/2022 as outlined below:          Constitutional:  No acute distress, cooperative, pleasant      HENT:  Oral mucosa moist, oropharynx benign. Eyes: Pupils are symmetrical, equal and reactive. Anicteric sclera, no pallor. Resp: Oxygen requirement: On room air  Breathing comfortably without tachypnea or evidence of accessory muscle use. CTA bilaterally. No wheezing/rhonchi/rales. CV:  Regular rhythm, normal rate, no murmurs, gallops, rubs      GI: Nondistended abdomen. Normoactive bowel sounds. Soft,non tender. No appreciable hepatosplenomegaly. : No CVA or suprapubic tenderness      Musculoskeletal: Left foot bandaged. No edema     Skin: No rash, erythema, depigmentation. Neurologic: Mental status: Alert, orientated to place, person and time. Cranial nerves:CN II-XII reviewed, grossly intact    Motor exam: Moves all extremities symmetrically, no gross focal deficit. Data Review:    Review and/or order of clinical lab test  Review and/or order of tests in the radiology section of CPT  Review and/or order of tests in the medicine section of CPT      Labs:     Recent Labs     11/23/22  0036 11/22/22  1406   WBC 5.7 5.3   HGB 13.1 13.8   HCT 38.0 40.3    254     Recent Labs     11/23/22  0036 11/22/22  1406    140   K 3.8 3.6    107   CO2 28 29   BUN 10 11   CREA 0.83 0.88   GLU 99 105*   CA 8.9 8.5   MG  --  2.2  2.0     Recent Labs     11/23/22  0036 11/22/22  1406   ALT 17 15   AP 44* 46   TBILI 0.6 0.6   TP 6.9 7.1   ALB 3.2* 3.4*   GLOB 3.7 3.7     Recent Labs     11/22/22  1406   APTT 27.3      No results for input(s): FE, TIBC, PSAT, FERR in the last 72 hours. No results found for: FOL, RBCF   No results for input(s): PH, PCO2, PO2 in the last 72 hours. No results for input(s): CPK, CKNDX, TROIQ in the last 72 hours.     No lab exists for component: CPKMB  Lab Results Component Value Date/Time    Cholesterol, total 210 (H) 11/23/2022 12:31 AM    HDL Cholesterol 111 11/23/2022 12:31 AM    LDL, calculated 91.8 11/23/2022 12:31 AM    Triglyceride 36 11/23/2022 12:31 AM    CHOL/HDL Ratio 1.9 11/23/2022 12:31 AM     Lab Results   Component Value Date/Time    Glucose (POC) 99 02/08/2017 05:40 PM     Lab Results   Component Value Date/Time    Color Yellow 06/08/2017 09:25 AM    Appearance Clear 06/08/2017 09:25 AM    Specific gravity 1.027 08/20/2010 03:30 PM    pH (UA) 6.5 06/08/2017 09:25 AM    Protein NEGATIVE 08/20/2010 03:30 PM    Glucose NEGATIVE 08/20/2010 03:30 PM    Ketone Negative 06/08/2017 09:25 AM    Bilirubin Negative 06/08/2017 09:25 AM    Urobilinogen 1.0 08/20/2010 03:30 PM    Nitrites Negative 06/08/2017 09:25 AM    Leukocyte Esterase Negative 06/08/2017 09:25 AM    Epithelial cells 0-5 08/20/2010 03:30 PM    Bacteria Few 06/08/2017 09:25 AM    WBC 0-5 06/08/2017 09:25 AM    RBC 0-2 06/08/2017 09:25 AM         Medications Reviewed:     Current Facility-Administered Medications   Medication Dose Route Frequency    metoprolol succinate (TOPROL-XL) XL tablet 50 mg  50 mg Oral DAILY    buprenorphine-naloxone (SUBOXONE) 8-2mg SL tablet  1 Tablet SubLINGual QAM    buprenorphine-naloxone (SUBOXONE) 8-2mg SL tablet  0.5 Tablet SubLINGual QPM    lisinopriL (PRINIVIL, ZESTRIL) tablet 20 mg  20 mg Oral DAILY    therapeutic multivitamin (THERAGRAN) tablet 1 Tablet  1 Tablet Oral DAILY    sodium chloride (NS) flush 5-40 mL  5-40 mL IntraVENous Q8H    sodium chloride (NS) flush 5-40 mL  5-40 mL IntraVENous PRN    0.9% sodium chloride infusion  75 mL/hr IntraVENous CONTINUOUS    nitroglycerin (NITROSTAT) tablet 0.4 mg  0.4 mg SubLINGual Q5MIN PRN     ______________________________________________________________________  EXPECTED LENGTH OF STAY: 1d 19h  ACTUAL LENGTH OF STAY:          1                 Neal Peres MD

## 2022-11-24 NOTE — PROGRESS NOTES
Cardiology Progress Note                                        Admit Date: 11/22/2022    Assessment/Plan:     Cardiomyopathy; consider stress cardiomyopathy; on GDMT  HTN; being controlled  CAD; mild    Lee Ray. is a 81 y.o. male with     PROBLEM LIST:  Patient Active Problem List    Diagnosis Date Noted    Chest pain 11/22/2022    VITALIY on CPAP 11/09/2022    Hepatitis C antibody positive in blood 09/21/2021    Hepatitis C virus infection cured after antiviral drug therapy 09/21/2021    H/O gastric ulcer 02/09/2017    Vitamin D deficiency 05/19/2015    Cervical pain (neck) 03/13/2015    Encounter for monitoring Suboxone maintenance therapy 08/03/2011    H/O partial resection of colon 08/27/2010    Hypertension 08/27/2010         Subjective:     Lee Norwood Jr. reports none. Visit Vitals  BP (!) 135/95   Pulse 63   Temp 98.1 °F (36.7 °C)   Resp 16   Ht 5' 10\" (1.778 m)   Wt 171 lb 1.2 oz (77.6 kg)   SpO2 94%   BMI 24.55 kg/m²       Intake/Output Summary (Last 24 hours) at 11/24/2022 0858  Last data filed at 11/23/2022 1852  Gross per 24 hour   Intake 460 ml   Output 1200 ml   Net -740 ml       Objective:      Physical Exam:  HEENT: Perrla, EOMI  Neck: No JVD,  No thyroidmegaly  Resp: CTA bilaterally;  No wheezes or rales  CV: RRR s1s2 No murmur no s3  Abd:Soft, Nontender  Ext: No edema  Neuro: Alert and oriented; Nonfocal  Skin: Warm, Dry, Intact  Pulses: 2+ DP/PT/Rad      Telemetry: normal sinus rhythm    Current Facility-Administered Medications   Medication Dose Route Frequency    sodium chloride (NS) flush 5-40 mL  5-40 mL IntraVENous Q8H    sodium chloride (NS) flush 5-40 mL  5-40 mL IntraVENous PRN    metoprolol succinate (TOPROL-XL) XL tablet 50 mg  50 mg Oral DAILY    buprenorphine-naloxone (SUBOXONE) 8-2mg SL tablet  1 Tablet SubLINGual QAM    buprenorphine-naloxone (SUBOXONE) 8-2mg SL tablet  0.5 Tablet SubLINGual QPM    lisinopriL (PRINIVIL, ZESTRIL) tablet 20 mg  20 mg Oral DAILY aspirin chewable tablet 81 mg  81 mg Oral DAILY    atorvastatin (LIPITOR) tablet 10 mg  10 mg Oral DAILY    therapeutic multivitamin (THERAGRAN) tablet 1 Tablet  1 Tablet Oral DAILY    sodium chloride (NS) flush 5-40 mL  5-40 mL IntraVENous Q8H    sodium chloride (NS) flush 5-40 mL  5-40 mL IntraVENous PRN    0.9% sodium chloride infusion  75 mL/hr IntraVENous CONTINUOUS    nitroglycerin (NITROSTAT) tablet 0.4 mg  0.4 mg SubLINGual Q5MIN PRN         Data Review:   Labs:    Recent Results (from the past 24 hour(s))   ECHO ADULT COMPLETE    Collection Time: 11/23/22 11:33 AM   Result Value Ref Range    IVSd 0.9 0.6 - 1.0 cm    LVIDd 5.8 4.2 - 5.9 cm    LVIDs 3.6 cm    LVOT Diameter 2.1 cm    LVPWd 0.4 (A) 0.6 - 1.0 cm    LVOT Peak Gradient 2 mmHg    LVOT Peak Velocity 0.7 m/s    RVSP 5 mmHg    RV Free Wall Peak S' 17 cm/s    LA Diameter 3.2 cm    Est. RA Pressure 3 mmHg    AV Area by Peak Velocity 2.2 cm2    AR PHT 1,292.9 millisecond    AR Max Velocity PISA 4.2 m/s    AV Peak Gradient 4 mmHg    AV Peak Velocity 1.0 m/s    MV A Velocity 0.50 m/s    MV E Velocity 0.36 m/s    LV E' Septal Velocity 4 cm/s    MR Peak Gradient 8 mmHg    MR Peak Velocity 1.4 m/s    PV Peak Gradient 1 mmHg    PV Max Velocity 0.6 m/s    TAPSE 2.2 1.7 cm    TR Peak Gradient 2 mmHg    TR Max Velocity 0.72 m/s    Aortic Root 3.2 cm    Fractional Shortening 2D 38 28 - 44 %    LVIDd Index 2.91 cm/m2    LVIDs Index 1.81 cm/m2    LV RWT Ratio 0.14     LV Mass 2D 136.0 88 - 224 g    LV Mass 2D Index 68.4 49 - 115 g/m2    MV E/A 0.72     E/E' Septal 9.00     LVOT Area 3.5 cm2    LA Size Index 1.61 cm/m2    LA/AO Root Ratio 1.00     Ao Root Index 1.61 cm/m2    AV Velocity Ratio 0.70     KEVIN/BSA Peak Velocity 1.1 cm2/m2

## 2022-11-24 NOTE — DISCHARGE INSTRUCTIONS
Discharge Instructions       PATIENT ID: Srini Norwood Jr. MRN: [id number]   YOB: 1950    DATE OF ADMISSION: [unfilled]    DATE OF DISCHARGE: 11/24/2022    PRIMARY CARE PROVIDER: @PCP@     ATTENDING PHYSICIAN: [unfilled]  DISCHARGING PROVIDER: Yaquelin Madrid MD    To contact this individual call [phone] and ask the  to page. If unavailable ask to be transferred the Adult Hospitalist Department. DISCHARGE DIAGNOSES NSTEMI    CONSULTATIONS: Cardiology    PROCEDURES/SURGERIES: Procedure(s):  LEFT HEART CATH / CORONARY ANGIOGRAPHY    PENDING TEST RESULTS:   At the time of discharge the following test results are still pending: none    FOLLOW UP APPOINTMENTS:   PMD  Cardiology    ADDITIONAL CARE RECOMMENDATIONS:   As above    DIET: Cardiac Diet    ACTIVITY: Activity as tolerated      DISCHARGE MEDICATIONS:   See Medication Reconciliation Form    It is important that you take the medication exactly as they are prescribed. Keep your medication in the bottles provided by the pharmacist and keep a list of the medication names, dosages, and times to be taken in your wallet. Do not take other medications without consulting your doctor. NOTIFY YOUR PHYSICIAN FOR ANY OF THE FOLLOWING:   Fever over 101 degrees for 24 hours. Chest pain, shortness of breath, fever, chills, nausea, vomiting, diarrhea, change in mentation, falling, weakness, bleeding. Severe pain or pain not relieved by medications. Or, any other signs or symptoms that you may have questions about.       DISPOSITION:  x  Home With:   OT  PT  HH  RN       SNF/Inpatient Rehab/LTAC    Independent/assisted living    Hospice    Other:     CDMP Checked:   Yes x     PROBLEM LIST Updated:  Yes x       Signed:   Yaquelin Madrid MD  11/24/2022  9:06 AM

## 2022-12-01 ENCOUNTER — PATIENT OUTREACH (OUTPATIENT)
Dept: CASE MANAGEMENT | Age: 72
End: 2022-12-01

## 2022-12-01 NOTE — PROGRESS NOTES
Ambulatory Care Management Note    Date/Time:  12/1/2022 2:06 PM    This patient was received as a referral from [employer]. Ambulatory Care Manager outreached to patient today to offer care management services. Introduction to self and role of care manager provided. Patient accepted care management services at this time. Follow up call scheduled at this time. Patient has Ambulatory Care Manager's contact number for any questions or concerns. Patient reports doing well since home from the hospital. Started on ASA, lipitor and Toprol and discontinued Cardizem. Has follow up appt with PCP on 12/5 and with Dr Alissa Lockhart on 12/20. Starts cardiac rehab 12/8. Reports no SOB or CP. Activity tolerance is good. Had recent foot surgery on 11/17 and saw the ortho MD for follow up today. Is out of work for the next two weeks and still PWB on ortho boot. ACM contact info provided to patient for questions / concerns.  Will follow up with patient in 1-2 weeks for full CM assessment

## 2022-12-05 ENCOUNTER — OFFICE VISIT (OUTPATIENT)
Dept: PRIMARY CARE CLINIC | Age: 72
End: 2022-12-05
Payer: COMMERCIAL

## 2022-12-05 VITALS
HEIGHT: 61 IN | DIASTOLIC BLOOD PRESSURE: 87 MMHG | TEMPERATURE: 98 F | OXYGEN SATURATION: 98 % | BODY MASS INDEX: 33.79 KG/M2 | WEIGHT: 179 LBS | HEART RATE: 67 BPM | RESPIRATION RATE: 18 BRPM | SYSTOLIC BLOOD PRESSURE: 130 MMHG

## 2022-12-05 DIAGNOSIS — I10 ESSENTIAL HYPERTENSION: ICD-10-CM

## 2022-12-05 DIAGNOSIS — I51.81 STRESS-INDUCED CARDIOMYOPATHY: Primary | ICD-10-CM

## 2022-12-05 PROCEDURE — 99214 OFFICE O/P EST MOD 30 MIN: CPT | Performed by: INTERNAL MEDICINE

## 2022-12-05 RX ORDER — ONDANSETRON 4 MG/1
TABLET, ORALLY DISINTEGRATING ORAL
COMMUNITY
Start: 2022-11-14

## 2022-12-05 NOTE — PROGRESS NOTES
Identified pt with two pt identifiers(name and ). Reviewed record in preparation for visit and have obtained necessary documentation. All patient medications has been reviewed. Chief Complaint   Patient presents with    Hospital Follow Up       3 most recent PHQ Screens 2022   Little interest or pleasure in doing things Not at all   Feeling down, depressed, irritable, or hopeless Not at all   Total Score PHQ 2 0     Abuse Screening Questionnaire 2020   Do you ever feel afraid of your partner? N   Are you in a relationship with someone who physically or mentally threatens you? N   Is it safe for you to go home? Y       Health Maintenance Due   Topic    Hepatitis B Vaccine (1 of 3 - Risk 3-dose series)    AAA Screening 73-67 YO Male Smoking Patients      Health Maintenance Review: Patient reminded of \"due or due soon\" health maintenance. I have asked the patient to contact his/her primary care provider (PCP) for follow-up on his/her health maintenance. Vitals:    22 1101   BP: 130/87   Pulse: 67   Resp: 18   Temp: 98 °F (36.7 °C)   TempSrc: Oral   SpO2: 98%   Weight: 179 lb (81.2 kg)   Height: 5' 1\" (1.549 m)       Wt Readings from Last 3 Encounters:   22 179 lb (81.2 kg)   22 171 lb 1.2 oz (77.6 kg)   22 183 lb (83 kg)     Temp Readings from Last 3 Encounters:   22 98 °F (36.7 °C) (Oral)   22 98.1 °F (36.7 °C)   22 97.1 °F (36.2 °C) (Temporal)     BP Readings from Last 3 Encounters:   22 130/87   22 (!) 135/95   22 (!) 145/81     Pulse Readings from Last 3 Encounters:   22 67   22 61   22 79       1. \"Have you been to the ER, urgent care clinic since your last visit? Hospitalized since your last visit? \"  Yes    2. \"Have you seen or consulted any other health care providers outside of the [address] [place] [address] since your last visit? \"  No

## 2022-12-05 NOTE — PROGRESS NOTES
[address] [facility] Fercho David. is a 81 y.o.  male and presents with     Chief Complaint   Patient presents with    Hospital Follow Up   [address]: 11/22/2022  1:41 PM    DATE OF DISCHARGE: 11/24/2022     Pt had surgery on left foot. Subsequently he had chest paim SOB, palpitations - went to ER. Had cath that showed  RCA showed 60% stenosis. Pt was started on stains, apsirin. Diltazem was stopped. Metorpolol was added. HAs appt with cardiology. Pt does not smoke    Troponin was elevated Ddimer - was elevated. HOwever CT chest did not shows PE. Pt sits on the computer. Father has CAD. Past Medical History:   Diagnosis Date    Hypertension     PUD (peptic ulcer disease)     Seizures ([address]     as a child-none since ~1976     Past Surgical History:   Procedure Laterality Date    HX APPENDECTOMY      HX GI      surgical repair of bleeding ulcer    HX GI      colon resection    HX HEENT      T&A    HX KNEE ARTHROSCOPY  1996    right knee    HX ORTHOPAEDIC  2003 & 2005    left knee     Current Outpatient Medications   Medication Sig    ondansetron (ZOFRAN ODT) 4 mg disintegrating tablet DISSOLVE 1 TABLET BY MOUTH EVERY 4 HOURS AS NEEDED POST-OP NAUSEA    aspirin 81 mg chewable tablet Take 1 Tablet by mouth daily. atorvastatin (LIPITOR) 10 mg tablet Take 1 Tablet by mouth daily. metoprolol succinate (TOPROL-XL) 50 mg XL tablet Take 1 Tablet by mouth daily. buprenorphine-naloxone 8-2 mg subl 1 Tablet by SubLINGual route Every morning. buprenorphine-naloxone 8-2 mg subl 0.5 Tablets by SubLINGual route every evening. lisinopriL (PRINIVIL, ZESTRIL) 20 mg tablet Take 1 Tablet by mouth daily. fluticasone propionate (FLONASE) 50 mcg/actuation nasal spray SPRAY 2 SPRAYS INTO EACH NOSTRIL EVERY DAY AS NEEDED    multivitamin (ONE A DAY) tablet Take 1 Tab by mouth daily. No current facility-administered medications for this visit.      Health Maintenance   Topic Date Due    Hepatitis B Vaccine (1 of 3 - Risk 3-dose series) Never done    AAA Screening 73-67 YO Male Smoking Patients  Never done    Depression Screen  06/09/2023    Colorectal Cancer Screening Combo  11/15/2023    Lipid Screen  11/23/2023    DTaP/Tdap/Td series (2 - Td or Tdap) 05/18/2025    Hepatitis C Screening  Completed    Shingrix Vaccine Age 50>  Completed    Flu Vaccine  Completed    COVID-19 Vaccine  Completed    Pneumococcal 65+ years  Completed     Immunization History   Administered Date(s) Administered    COVID-19, MODERNA BLUE border, Primary or Immunocompromised, (age 18y+), IM, 100 mcg/0.5mL 02/20/2021, 03/22/2021, 10/29/2021, 05/06/2022, 09/29/2022    Influenza High Dose Vaccine PF 12/13/2016, 10/16/2017, 09/08/2021, 10/10/2022    Influenza Vaccine 01/11/2013, 11/14/2014, 11/20/2015    Influenza Vaccine (Tri) Adjuvanted (>65 Yrs FLUAD TRI 10167) 03/18/2019    Influenza Vaccine Split 10/28/2011    Pneumococcal Conjugate (PCV-13) 06/09/2017    Pneumococcal Polysaccharide (PPSV-23) 03/18/2019, 10/10/2022    Tdap 05/18/2015    Zoster Recombinant 09/13/2020, 01/15/2021     No LMP for male patient. Allergies and Intolerances:   No Known Allergies    Family History:   Family History   Problem Relation Age of Onset    Heart Disease Mother     Diabetes Father     Diabetes Sister     Diabetes Paternal Grandmother        Social History:   He  reports that he quit smoking about 12 years ago. His smoking use included cigarettes. He started smoking about 54 years ago. He has a 10.50 pack-year smoking history. He has never used smokeless tobacco.  He  reports that he does not currently use alcohol after a past usage of about 0.8 standard drinks per week.             Review of Systems:   General: negative for - chills, fatigue, fever, weight change  Psych: negative for - anxiety, depression, irritability or mood swings  ENT: negative for - headaches, hearing change, nasal congestion, oral lesions, sneezing or sore throat  Heme/ Lymph: negative for - bleeding problems, bruising, pallor or swollen lymph nodes  Endo: negative for - hot flashes, polydipsia/polyuria or temperature intolerance  Resp: negative for - cough, shortness of breath or wheezing  CV: negative for - chest pain, edema or palpitations  GI: negative for - abdominal pain, change in bowel habits, constipation, diarrhea or nausea/vomiting  : negative for - dysuria, hematuria, incontinence, pelvic pain or vulvar/vaginal symptoms  MSK: negative for - joint pain, joint swelling or muscle pain  Neuro: negative for - confusion, headaches, seizures or weakness  Derm: negative for - dry skin, hair changes, rash or skin lesion changes          Physical:   Vitals:   Vitals:    12/05/22 1101   BP: 130/87   Pulse: 67   Resp: 18   Temp: 98 °F (36.7 °C)   TempSrc: Oral   SpO2: 98%   Weight: 179 lb (81.2 kg)   Height: 5' 1\" (1.549 m)           Exam:   HEENT- atraumatic,normocephalic, awake, oriented, well nourished  Neck - supple,no enlarged lymph nodes, no JVD, no thyromegaly  Chest- CTA, no rhonchi, no crackles  Heart- rrr, no murmurs / gallop/rub  Abdomen- soft,BS+,NT, no hepatosplenomegaly  Ext - no c/c/edema   Neuro- no focal deficits. Power 5/5 all extremities  Skin - warm,dry, no obvious rashes.           Review of Data:   LABS:   Lab Results   Component Value Date/Time    WBC 5.7 11/23/2022 12:36 AM    HGB 13.1 11/23/2022 12:36 AM    HCT 38.0 11/23/2022 12:36 AM    PLATELET 603 48/35/6383 12:36 AM     Lab Results   Component Value Date/Time    Sodium 139 11/23/2022 12:36 AM    Potassium 3.8 11/23/2022 12:36 AM    Chloride 105 11/23/2022 12:36 AM    CO2 28 11/23/2022 12:36 AM    Glucose 99 11/23/2022 12:36 AM    BUN 10 11/23/2022 12:36 AM    Creatinine 0.83 11/23/2022 12:36 AM     Lab Results   Component Value Date/Time    Cholesterol, total 210 (H) 11/23/2022 12:31 AM    HDL Cholesterol 111 11/23/2022 12:31 AM    LDL, calculated 91.8 11/23/2022 12:31 AM    Triglyceride 36 11/23/2022 12:31 AM     No components found for: GPT        Impression / Plan:        ICD-10-CM ICD-9-CM    1. Stress-induced cardiomyopathy  I51.81 429.83       2. Essential hypertension  I10 401.9         Clinically doing well. Follow-up with cardiology. Continue aspirin beta-blocker and low-dose statin that was started in the hospital    Return to clinic or ER if chest pain occurs or recurs. Explained to patient risk benefits of the medications. Advised patient to stop meds if having any side effects. Pt verbalized understanding of the instructions. I have discussed the diagnosis with the patient and the intended plan as seen in the above orders. The patient has received an after-visit summary and questions were answered concerning future plans. I have discussed medication side effects and warnings with the patient as well. I have reviewed the plan of care with the patient, accepted their input and they are in agreement with the treatment goals. Reviewed plan of care. Patient has provided input and agrees with goals. Follow-up and Dispositions    Return in about 3 months (around 3/5/2023).          Diana Ornelas MD

## 2022-12-08 ENCOUNTER — HOSPITAL ENCOUNTER (OUTPATIENT)
Dept: CARDIAC REHAB | Age: 72
Discharge: HOME | End: 2022-12-08
Payer: COMMERCIAL

## 2022-12-08 VITALS — WEIGHT: 178.8 LBS | HEIGHT: 70 IN | BODY MASS INDEX: 25.6 KG/M2

## 2022-12-08 PROCEDURE — 93797 PHYS/QHP OP CAR RHAB WO ECG: CPT

## 2022-12-08 NOTE — CARDIO/PULMONARY
Aubrey Segovia  81 y.o. presented to cardiac wellness for orientation and exercise tolerance test today with a primary diagnosis of NSTEMI. EF is 45%. Aubrey Segovia has a history of HTN and multiple GI problems, but this is his first cardiac event. Cardiac risk factors include smoking/ tobacco exposure, family history, dyslipidemia, hypertension, stress and these were reviewed with him. 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 PHQ9 depression score is 0 and this is considered mild. The result was discussed with patient who affirms score to be accurate. He states he used to smoke but quit in 2010 and used to drink but has not had a drink in 5 years. He takes medication for a previous addiction to narcotics and feels the program is very successful. Mr. Margarite Gitelman came to his intake appointment today on crutches with is L foot in a surgical boot. He had a revision of a previous bunionectomy and has not been cleared by his surgeon to bear weight. Because of this, his exercise tolerance test has been post-poned until after his follow up post-op foot surgery appointment. He expects to be done with both the crutches and boot and able to participate by the end of the month. Aubrey Norwood Jr. Was in NSR with occasional PVCs at rest on telemetry. Patient denied chest pain or SOB. Aubrey Segovia will attend exercise 2-3 times weekly in cardiac wellness. An education manual was given to the patient and reviewed briefly.       Mert Bernstein RN  12/8/2022

## 2022-12-20 ENCOUNTER — OFFICE VISIT (OUTPATIENT)
Dept: CARDIOLOGY CLINIC | Age: 72
End: 2022-12-20
Payer: COMMERCIAL

## 2022-12-20 VITALS
SYSTOLIC BLOOD PRESSURE: 130 MMHG | WEIGHT: 178 LBS | DIASTOLIC BLOOD PRESSURE: 78 MMHG | BODY MASS INDEX: 25.48 KG/M2 | HEIGHT: 70 IN | OXYGEN SATURATION: 95 % | RESPIRATION RATE: 16 BRPM

## 2022-12-20 DIAGNOSIS — I25.10 CORONARY ARTERY DISEASE INVOLVING NATIVE CORONARY ARTERY OF NATIVE HEART WITHOUT ANGINA PECTORIS: ICD-10-CM

## 2022-12-20 DIAGNOSIS — I77.810 AORTIC ROOT DILATATION (HCC): ICD-10-CM

## 2022-12-20 DIAGNOSIS — I51.7 BIATRIAL ENLARGEMENT: ICD-10-CM

## 2022-12-20 DIAGNOSIS — I44.0 1ST DEGREE AV BLOCK: ICD-10-CM

## 2022-12-20 DIAGNOSIS — I10 ESSENTIAL HYPERTENSION: ICD-10-CM

## 2022-12-20 DIAGNOSIS — I42.8 NON-ISCHEMIC CARDIOMYOPATHY (HCC): Primary | ICD-10-CM

## 2022-12-20 PROCEDURE — 1123F ACP DISCUSS/DSCN MKR DOCD: CPT | Performed by: INTERNAL MEDICINE

## 2022-12-20 PROCEDURE — 3074F SYST BP LT 130 MM HG: CPT | Performed by: INTERNAL MEDICINE

## 2022-12-20 PROCEDURE — 3078F DIAST BP <80 MM HG: CPT | Performed by: INTERNAL MEDICINE

## 2022-12-20 PROCEDURE — 99214 OFFICE O/P EST MOD 30 MIN: CPT | Performed by: INTERNAL MEDICINE

## 2022-12-20 NOTE — PROGRESS NOTES
Cardiac Electrophysiology OFFICE Note     Subjective:      Lee Ray. is a 81 y.o. patient who had been seen for follow up  He was in [facility] with chest pain and elevated troponin  Dr Rosanna Delgado did cardiac cath, 60% RCA stenosis and LVEF read 40-45%  (Echocardiogram showed EF of 45-50%. LHC showed one-vessel CAD mid RCA 60% stenosed, no intervention performed. Stress cardiomyopathy being considered.)    He is not able to walk much due to left foot boot  He has foot injury    He works on computer and sits mostly  He asked me to fill out paper work for disability but his heart condition was seen by Dr Rosanna Delgado in hospital and called stress induced cardiomyopathy so if LVEF is better with meds then it will not support disability  He is not having NYHA class 3 or 4 from CHF so will not be eligible for disability due to cardiac condition    He had been kindly referred by Dr. Elise Mccarty in the past.    ECG had been done in 01/2021 for properative clearance (bunionectomy). ECG showed NSR with incomplete RBBB & 1st degree AVB. Review of prior ECG in 2017 also showed incomplete RBBB, but 1st degree AVB wasn't present then. Occasionally \"hears\" his heart beating, but denies rapid palpitations. Stress echo was negative for ischemia (6/7/2021)  2 D echo with RV and biatrial enlargement  He was referred to sleep study and has apnea 49/hr, he is prescribed APAP by Dr Edilia Ontiveros       ECHO ADULT COMPLETE 06/23/2021 6/23/2021    Interpretation Summary  · LV: Calculated LVEF is 62%. Biplane method used to measure ejection fraction. Normal cavity size and systolic function (ejection fraction normal). Upper normal wall thickness. Wall motion: normal. Mild (grade 1) left ventricular diastolic dysfunction. · LA: Moderately dilated left atrium. · RV: Mildly dilated right ventricle. · RA: Severely dilated right atrium. · MV: Mitral valve thickening. Mild mitral valve regurgitation is present.   · TV: Mild tricuspid valve regurgitation is present. Right Ventricular Arterial Pressure (RVSP) is 29 mmHg. Pulmonary hypertension not suggested by Doppler findings. · PV: Mild pulmonic valve regurgitation is present. · AO: Mild ascending aorta dilatation. Ascending aorta diameter = 4.2 cm. Diastolic BP borderline high but recheck was normal       Previous:  Evaluated for chest discomfort in 2014, had normal stress echo. Echo 2014 with normal LVEF and mild MR TR    Father had heart problems (specifics unknown) in his 50s-60s, had multiple hospitalizations. Other family members on paternal side with heart disease. Problem List  Date Reviewed: 12/20/2022            Codes Class Noted    Chest pain ICD-10-CM: R07.9  ICD-9-CM: 786.50  11/22/2022        VITALIY on CPAP ICD-10-CM: G47.33, Z99.89  ICD-9-CM: 327.23, V46.8  11/9/2022        Hepatitis C antibody positive in blood ICD-10-CM: R76.8  ICD-9-CM: 795.79  9/21/2021    Overview Signed 9/21/2021  9:42 AM by Adeladie Sanchez., NP     Pt has been treated and cured of active HCV. This test will always be positive. Hepatitis C virus infection cured after antiviral drug therapy ICD-10-CM: Z86.19  ICD-9-CM: V12.09  9/21/2021    Overview Signed 9/21/2021  9:42 AM by Adelaide Sanchez., NP     Treated with 8 weeks of Mavyret, completed in 9/2020. He has achieved SVR.               H/O gastric ulcer ICD-10-CM: Z87.11  ICD-9-CM: V12.79  2/9/2017        Vitamin D deficiency ICD-10-CM: E55.9  ICD-9-CM: 268.9  5/19/2015        Cervical pain (neck) ICD-10-CM: M54.2  ICD-9-CM: 723.1  3/13/2015        Encounter for monitoring Suboxone maintenance therapy ICD-10-CM: Z51.81, Z79.899  ICD-9-CM: V58.83, V58.69  8/3/2011        H/O partial resection of colon ICD-10-CM: Z90.49  ICD-9-CM: V45.72  8/27/2010    Overview Signed 3/6/2020  8:58 AM by Monalisa Segudno MD     Due to diverticulitis and colon stricture             Hypertension ICD-10-CM: I10  ICD-9-CM: 401.9  8/27/2010           Current Outpatient Medications   Medication Sig Dispense Refill    aspirin 81 mg chewable tablet Take 1 Tablet by mouth daily. 30 Tablet 2    atorvastatin (LIPITOR) 10 mg tablet Take 1 Tablet by mouth daily. 30 Tablet 2    metoprolol succinate (TOPROL-XL) 50 mg XL tablet Take 1 Tablet by mouth daily. 30 Tablet 2    buprenorphine-naloxone 8-2 mg subl 1 Tablet by SubLINGual route Every morning. buprenorphine-naloxone 8-2 mg subl 0.5 Tablets by SubLINGual route every evening. lisinopriL (PRINIVIL, ZESTRIL) 20 mg tablet Take 1 Tablet by mouth daily. 90 Tablet 1    fluticasone propionate (FLONASE) 50 mcg/actuation nasal spray SPRAY 2 SPRAYS INTO EACH NOSTRIL EVERY DAY AS NEEDED 1 Each 2    multivitamin (ONE A DAY) tablet Take 1 Tab by mouth daily.       ondansetron (ZOFRAN ODT) 4 mg disintegrating tablet DISSOLVE 1 TABLET BY MOUTH EVERY 4 HOURS AS NEEDED POST-OP NAUSEA (Patient not taking: No sig reported)       No Known Allergies  Past Medical History:   Diagnosis Date    Hypertension     PUD (peptic ulcer disease)     Seizures ([facility] [address]     as a child-none since ~     Past Surgical History:   Procedure Laterality Date    HX APPENDECTOMY      HX GI      surgical repair of bleeding ulcer    HX GI      colon resection    HX HEENT      T&A    HX KNEE ARTHROSCOPY      right knee    HX ORTHOPAEDIC   &     left knee     Family History   Problem Relation Age of Onset    Heart Disease Father     Diabetes Father     Diabetes Sister     Diabetes Sister     Hypertension Brother     Diabetes Paternal Grandmother     Asthma Son      Social History     Tobacco Use    Smoking status: Former     Packs/day: 0.25     Years: 42.00     Pack years: 10.50     Types: Cigarettes     Start date: 1968     Quit date: 2010     Years since quittin.5    Smokeless tobacco: Never    Tobacco comments:     quit May 2010   Substance Use Topics    Alcohol use: Not Currently     Alcohol/week: 0.8 standard drinks     Types: 1 Cans of beer per week        Review of Systems: Review of all other systems otherwise negative. Constitutional: Negative for fever, chills, weight loss, malaise/fatigue. HEENT: Negative for nosebleeds, vision changes. +snoring  Respiratory: Negative for cough, hemoptysis  Cardiovascular:  no palpitations, orthopnea, claudication, leg swelling, syncope, and PND. Gastrointestinal: Negative for nausea, vomiting, diarrhea, blood in stool and melena. Genitourinary: Negative for dysuria, and hematuria. Musculoskeletal: Negative for myalgias, + foot arthralgia. Skin: Negative for rash. Heme: Does not bleed or bruise easily. Neurological: Negative for speech change and focal weakness. + foot burning pain     Objective:     Visit Vitals  /78   Resp 16   Ht 5' 10\" (1.778 m)   Wt 178 lb (80.7 kg)   SpO2 95%   BMI 25.54 kg/m²      Physical Exam:   Constitutional: Well-developed and well-nourished. No respiratory distress. Head: Normocephalic and atraumatic. Eyes: Pupils are equal, round. ENT: Hearing grossly normal.  Neck: Supple. No JVD present. Cardiovascular: Normal rate, regular rhythm. Exam reveals no gallop and no friction rub. No murmur heard. Pulmonary/Chest: Effort normal and breath sounds normal. No wheezes. Abdominal: Soft, no tenderness. Musculoskeletal: Moves extremities independently. left leg on boot  Vasc/lymphatic: No edema. Neurological: Alert,oriented. Skin: Skin is warm and dry. Psychiatric: Normal mood and affect. Behavior is normal. Judgment and thought content normal.      EKG: NSR with incomplete RBBB, 1st degree AVB. Assessment/Plan:        ICD-10-CM ICD-9-CM    1. Non-ischemic cardiomyopathy (HCC)  I42.8 425.4       2. Biatrial enlargement  I51.7 429.3 ECHO ADULT FOLLOW-UP OR LIMITED      3. Aortic root dilatation (HCC)  I77.810 447.71 ECHO ADULT FOLLOW-UP OR LIMITED      4. Essential hypertension  I10 401.9       5. 1st degree AV block  I44.0 426.11       6.  Coronary artery disease involving native coronary artery of native heart without angina pectoris  I25.10 414.01           Non ischemic cardiomyopathy- recent left heart cath suggested stress induced cardiomyopathy  Continue with GDMT  Recheck 2 D echo for LVEF    CAD - 60% RCA stenosis, continue with aspirin and statin lipitor    Conduction abnormality: Incomplete RBBB present on 2017 ECG as well. 1st degree AVB new but mild and he can continue with toprol for now. HTN: controlled today. Continue toprol daily and lisinopril for now. Right heart and biatrial enlargement could be related to sleep apnea  Sleep apnea treatment     Mild dilation of aorta 42 mm  Continue to follow up over time     Foot drop and injury- follow up with ortho, neuro     Addendum  Limited echo 1/2023 LVEF 50-55%, improved     Future Appointments   Date Time Provider Adriano Munguia   1/5/2023  3:45 PM Kaylee SORTO BS AMB   3/10/2023 11:00 AM Hans Sosa MD [place] BS AMB   7/13/2023  2:40 PM MD MACARIO Painter BS AMB       Thank you for involving me in this patient's care and please call with further concerns or questions. Ayana Jose M.D.   Electrophysiology/Cardiology  [facility] [place]  [address] [place], [place] [address]  [address] [phone]

## 2022-12-23 DIAGNOSIS — I21.4 ACUTE MYOCARDIAL INFARCTION, SUBENDOCARDIAL INFARCTION, INITIAL EPISODE OF CARE (HCC): ICD-10-CM

## 2022-12-29 ENCOUNTER — PATIENT OUTREACH (OUTPATIENT)
Dept: CASE MANAGEMENT | Age: 72
End: 2022-12-29

## 2022-12-29 NOTE — PROGRESS NOTES
Ambulatory Care Management Note    Date/Time:  12/29/2022 9:56 AM    This Ambulatory Care Manager (ACM) reviewed and updated the following screenings during this call; general assessment, self management assessment, SDOH assessments, ACP assessment and note, and medication reconciliation     Patient's challenges to self-management identified:    none      Medication Management:  good adherence and good understanding    Advance Care Planning:   Does patient have an Advance Directive:  currently not on file; education provided     [employer], Referrals, and Durable Medical Equipment: none      Health Maintenance Due   Topic Date Due    Hepatitis B Vaccine (1 of 3 - Risk 3-dose series) Never done    AAA Screening 73-69 YO Male Smoking Patients  Never done     Health Maintenance Reviewed: Not reviewed today    Patient was asked to consider health care goals that they would like to focus on with this ACM. ACM will follow up with patient to discuss goals and establish care plan in the next 7-14 days. Patient reports seeing ortho on 1/23 and ortho boot will likely be removed. After he completes PT and ortho boot is removed he will begin to pursue cardiac rehab.  Denies any chest pain or SOB and reports he is overall doing well post MI.    PCP/Specialist follow up:   Future Appointments   Date Time Provider Adriano Munguia   1/5/2023  3:45 PM Sasha Garvin BS AMB   3/10/2023 11:00 AM Chiqui Torres MD [facility] BS AMB   7/13/2023  2:40 PM MD MACARIO Toney BS AMB

## 2023-01-01 DIAGNOSIS — J30.2 SEASONAL ALLERGIC RHINITIS, UNSPECIFIED TRIGGER: ICD-10-CM

## 2023-01-03 RX ORDER — FLUTICASONE PROPIONATE 50 MCG
SPRAY, SUSPENSION (ML) NASAL
Qty: 1 EACH | Refills: 4 | Status: SHIPPED | OUTPATIENT
Start: 2023-01-03

## 2023-01-05 ENCOUNTER — ANCILLARY PROCEDURE (OUTPATIENT)
Dept: CARDIOLOGY CLINIC | Age: 73
End: 2023-01-05
Payer: COMMERCIAL

## 2023-01-05 VITALS — WEIGHT: 178 LBS | BODY MASS INDEX: 25.48 KG/M2 | HEIGHT: 70 IN

## 2023-01-05 DIAGNOSIS — I77.810 AORTIC ROOT DILATATION (HCC): ICD-10-CM

## 2023-01-05 DIAGNOSIS — I51.7 BIATRIAL ENLARGEMENT: ICD-10-CM

## 2023-01-08 LAB
ECHO AO ASC DIAM: 4.3 CM
ECHO AO ASCENDING AORTA INDEX: 2.16 CM/M2
ECHO AO ROOT DIAM: 3.7 CM
ECHO AO ROOT INDEX: 1.86 CM/M2
ECHO LA VOL 2C: 78 ML (ref 18–58)
ECHO LA VOL 4C: 67 ML (ref 18–58)
ECHO LA VOL BP: 73 ML (ref 18–58)
ECHO LA VOL/BSA BIPLANE: 37 ML/M2 (ref 16–34)
ECHO LA VOLUME AREA LENGTH: 75 ML
ECHO LA VOLUME INDEX A2C: 39 ML/M2 (ref 16–34)
ECHO LA VOLUME INDEX A4C: 34 ML/M2 (ref 16–34)
ECHO LA VOLUME INDEX AREA LENGTH: 38 ML/M2 (ref 16–34)
ECHO LV EDV A2C: 93 ML
ECHO LV EDV A4C: 75 ML
ECHO LV EDV BP: 85 ML (ref 67–155)
ECHO LV EDV INDEX A4C: 38 ML/M2
ECHO LV EDV INDEX BP: 43 ML/M2
ECHO LV EDV NDEX A2C: 47 ML/M2
ECHO LV EJECTION FRACTION A2C: 59 %
ECHO LV EJECTION FRACTION A4C: 45 %
ECHO LV EJECTION FRACTION BIPLANE: 52 % (ref 55–100)
ECHO LV ESV A2C: 38 ML
ECHO LV ESV A4C: 41 ML
ECHO LV ESV BP: 41 ML (ref 22–58)
ECHO LV ESV INDEX A2C: 19 ML/M2
ECHO LV ESV INDEX A4C: 21 ML/M2
ECHO LV ESV INDEX BP: 21 ML/M2
ECHO LV FRACTIONAL SHORTENING: 32 % (ref 28–44)
ECHO LV INTERNAL DIMENSION DIASTOLE INDEX: 2.21 CM/M2
ECHO LV INTERNAL DIMENSION DIASTOLIC: 4.4 CM (ref 4.2–5.9)
ECHO LV INTERNAL DIMENSION SYSTOLIC INDEX: 1.51 CM/M2
ECHO LV INTERNAL DIMENSION SYSTOLIC: 3 CM
ECHO LV IVSD: 1.3 CM (ref 0.6–1)
ECHO LV MASS 2D: 215.1 G (ref 88–224)
ECHO LV MASS INDEX 2D: 108.1 G/M2 (ref 49–115)
ECHO LV POSTERIOR WALL DIASTOLIC: 1.3 CM (ref 0.6–1)
ECHO LV RELATIVE WALL THICKNESS RATIO: 0.59
ECHO RA MINOR AXIS INDEX: 1.86 CM/M2
ECHO RA MINOR AXIS: 3.7 CM
ECHO RV INTERNAL DIMENSION: 3.7 CM

## 2023-01-09 ENCOUNTER — TELEPHONE (OUTPATIENT)
Dept: CARDIOLOGY CLINIC | Age: 73
End: 2023-01-09

## 2023-01-09 NOTE — TELEPHONE ENCOUNTER
Verified patient with two types of identifiers. Notified patient of results and MD recommendations. Patient verbalized understanding and will call with any other questions.       Future Appointments   Date Time Provider Adriano Munguia   3/10/2023 11:00 AM Wilmar Rodriguez MD [facility] BS AMB   7/13/2023  2:40 PM MD MACARIO Bush BS AMB

## 2023-01-09 NOTE — TELEPHONE ENCOUNTER
----- Message from Kaycee Johnson MD sent at 1/8/2023  9:44 AM EST -----  LVEF 50-55%, improved  Continue with medications and follow up appt as scheduled

## 2023-01-27 ENCOUNTER — TELEPHONE (OUTPATIENT)
Dept: CARDIAC REHAB | Age: 73
End: 2023-01-27

## 2023-01-27 NOTE — TELEPHONE ENCOUNTER
1/27/2023 Cardiac Rehab: Called Mr. Carolyne Senior. Enma Olmstead to follow up after his intake appointment when he had a boot on his foot and could not complete walk test portion of appointment that day. Mr. Lenetta Paget just got that boot taken off yesterday, 1/26/2023, and has therapy for his foot twice a week right now (not sure for how long). He goes to foot therapy on [address] I think he said, but is willing to come twice a week here as well for CR. Mr. Lenetta Paget was at work when I called, so he will have to go home to check his schedule and call us back to set up his next cardiac rehab appointment and  I made him aware we would have to do the walk test along with him staying to exercise when he does return. Mr. Lenetta Paget understands this and has no questions or concerns at this time. Gave him our phone number to call to set up future appointments when he I able.  Yashira Craig

## 2023-01-30 ENCOUNTER — PATIENT OUTREACH (OUTPATIENT)
Dept: CASE MANAGEMENT | Age: 73
End: 2023-01-30

## 2023-01-30 NOTE — PROGRESS NOTES
[facility] contacted patient for cm assessment. Patient reports he is headed out for physical therapy , his ortho boot was removed last week. He will also be starting cardiac rehab soon. Patient reports all is going well.  No concerns at this time    He will call [facility] back next week to talk further and complete assessment and med rec

## 2023-02-14 ENCOUNTER — HOSPITAL ENCOUNTER (OUTPATIENT)
Dept: CARDIAC REHAB | Age: 73
Discharge: HOME | End: 2023-02-14
Payer: COMMERCIAL

## 2023-02-14 VITALS — BODY MASS INDEX: 25.97 KG/M2 | WEIGHT: 181 LBS

## 2023-02-14 PROCEDURE — 93798 PHYS/QHP OP CAR RHAB W/ECG: CPT

## 2023-02-14 NOTE — CARDIO/PULMONARY
[address] Jr. Completed his first exercise session today at cardiac rehab including his 6 minute walk test on the treadmill. He was SR frequent PACs and rare PVCs on telemetry. [address] Jr. Will exercise with us at cardiac rehab 3 days weekly. His BP was elevated today and we will continue to monitor his HTN.   Guera Núñez RN

## 2023-02-16 ENCOUNTER — HOSPITAL ENCOUNTER (OUTPATIENT)
Dept: CARDIAC REHAB | Age: 73
Discharge: HOME | End: 2023-02-16
Payer: COMMERCIAL

## 2023-02-16 VITALS — WEIGHT: 179.2 LBS | BODY MASS INDEX: 25.71 KG/M2

## 2023-02-16 PROCEDURE — 93798 PHYS/QHP OP CAR RHAB W/ECG: CPT

## 2023-02-17 ENCOUNTER — HOSPITAL ENCOUNTER (OUTPATIENT)
Dept: CARDIAC REHAB | Age: 73
Discharge: HOME | End: 2023-02-17
Payer: COMMERCIAL

## 2023-02-17 VITALS — BODY MASS INDEX: 25.74 KG/M2 | WEIGHT: 179.4 LBS

## 2023-02-17 PROCEDURE — 93798 PHYS/QHP OP CAR RHAB W/ECG: CPT

## 2023-02-20 ENCOUNTER — TELEPHONE (OUTPATIENT)
Dept: CARDIOLOGY CLINIC | Age: 73
End: 2023-02-20

## 2023-02-20 DIAGNOSIS — I10 ESSENTIAL HYPERTENSION: ICD-10-CM

## 2023-02-20 RX ORDER — LISINOPRIL 40 MG/1
40 TABLET ORAL DAILY
Qty: 60 TABLET | Refills: 2 | Status: SHIPPED | OUTPATIENT
Start: 2023-02-20

## 2023-02-20 NOTE — TELEPHONE ENCOUNTER
Called patient but no answer. Left voicemail with phone number and request for return call.     Future Appointments   Date Time Provider Adriano Nilda   2/21/2023  4:00 PM [facility] CARDIAC WELLNESS EXERCISE [facility]   2/23/2023  4:00 PM [facility] CARDIAC WELLNESS EXERCISE [facility] H   2/24/2023  1:00  [facility]   3/10/2023 11:00 AM Vikas Soto MD [facility] BS AMB   5/4/2023  1:00 PM MD MACARIO Christie BS AMB

## 2023-02-20 NOTE — TELEPHONE ENCOUNTER
----- Message from Georganne Closs, MD sent at 2/17/2023  4:43 PM EST -----  Regarding: RE: HTN  Thanks  Will ask my nurse to tell him to increase lisinopril to 40 mg every day and move appt up 3 months    ----- Message -----  From: Alyx Hammond RN  Sent: 2/17/2023   1:45 PM EST  To: Georganne Closs, MD  Subject: HTN                                              Good afternoon,    Sergio Gautam. Enma Olmstead Is a current cardiac rehab patient at [facility]. His medications include lisinopril and metoprolol xl and he is taking them every morning. Today is his 4th visit to rehab and he has been hypertensive at rest each time. 156/88, 160/98. He does not have an appointment with you until summer. I have encouraged him to follow up with you regarding his HTN.   Stephen Coyle RN

## 2023-02-21 ENCOUNTER — TELEPHONE (OUTPATIENT)
Dept: CARDIOLOGY CLINIC | Age: 73
End: 2023-02-21

## 2023-02-21 ENCOUNTER — HOSPITAL ENCOUNTER (OUTPATIENT)
Dept: CARDIAC REHAB | Age: 73
Discharge: HOME | End: 2023-02-21
Payer: COMMERCIAL

## 2023-02-21 VITALS — BODY MASS INDEX: 25.68 KG/M2 | WEIGHT: 179 LBS

## 2023-02-21 PROCEDURE — 93798 PHYS/QHP OP CAR RHAB W/ECG: CPT

## 2023-02-21 NOTE — TELEPHONE ENCOUNTER
Verified patient with two types of identifiers. Informed patient of Dr. Dev Reed recommendations and appointment on 5/4/23. Patient verbalized understanding and will call with any other questions.

## 2023-02-22 ENCOUNTER — HOSPITAL ENCOUNTER (OUTPATIENT)
Dept: CARDIAC REHAB | Age: 73
Discharge: HOME | End: 2023-02-22
Payer: COMMERCIAL

## 2023-02-22 VITALS — BODY MASS INDEX: 25.91 KG/M2 | WEIGHT: 180.6 LBS

## 2023-02-22 PROCEDURE — 93798 PHYS/QHP OP CAR RHAB W/ECG: CPT

## 2023-02-23 ENCOUNTER — HOSPITAL ENCOUNTER (OUTPATIENT)
Dept: CARDIAC REHAB | Age: 73
Discharge: HOME | End: 2023-02-23
Payer: COMMERCIAL

## 2023-02-23 VITALS — BODY MASS INDEX: 25.74 KG/M2 | WEIGHT: 179.4 LBS

## 2023-02-23 PROCEDURE — 93798 PHYS/QHP OP CAR RHAB W/ECG: CPT

## 2023-02-23 RX ORDER — METOPROLOL SUCCINATE 50 MG/1
50 TABLET, EXTENDED RELEASE ORAL DAILY
Qty: 90 TABLET | Refills: 1 | Status: SHIPPED | OUTPATIENT
Start: 2023-02-23

## 2023-02-23 RX ORDER — ATORVASTATIN CALCIUM 10 MG/1
10 TABLET, FILM COATED ORAL DAILY
Qty: 90 TABLET | Refills: 1 | Status: SHIPPED | OUTPATIENT
Start: 2023-02-23

## 2023-02-23 NOTE — TELEPHONE ENCOUNTER
Patient called and advised he needs refill on metoprolol 50mg and atorvastatin 10 mg. Patient is out of medication. [id number]. .[id number]

## 2023-02-23 NOTE — TELEPHONE ENCOUNTER
Request for Toprol XL 50 mg daily and Atorvastatin 10 mg daily. Last office visit 12/20/22, next office visit 5/4/23. Refills per verbal order from Dr. Hermila Lawrence.

## 2023-02-24 ENCOUNTER — HOSPITAL ENCOUNTER (OUTPATIENT)
Dept: CARDIAC REHAB | Age: 73
Discharge: HOME | End: 2023-02-24
Payer: COMMERCIAL

## 2023-02-24 VITALS — WEIGHT: 177.6 LBS | BODY MASS INDEX: 25.48 KG/M2

## 2023-02-24 PROCEDURE — 93798 PHYS/QHP OP CAR RHAB W/ECG: CPT

## 2023-02-28 ENCOUNTER — HOSPITAL ENCOUNTER (OUTPATIENT)
Dept: CARDIAC REHAB | Age: 73
Discharge: HOME | End: 2023-02-28
Payer: COMMERCIAL

## 2023-02-28 VITALS — BODY MASS INDEX: 25.54 KG/M2 | WEIGHT: 178 LBS

## 2023-02-28 PROCEDURE — 93798 PHYS/QHP OP CAR RHAB W/ECG: CPT

## 2023-03-01 ENCOUNTER — PATIENT OUTREACH (OUTPATIENT)
Dept: CASE MANAGEMENT | Age: 73
End: 2023-03-01

## 2023-03-01 ENCOUNTER — HOSPITAL ENCOUNTER (OUTPATIENT)
Dept: CARDIAC REHAB | Age: 73
Discharge: HOME | End: 2023-03-01
Payer: COMMERCIAL

## 2023-03-01 VITALS — WEIGHT: 177.6 LBS | BODY MASS INDEX: 25.48 KG/M2

## 2023-03-01 PROCEDURE — 93798 PHYS/QHP OP CAR RHAB W/ECG: CPT

## 2023-03-01 NOTE — PROGRESS NOTES
Ambulatory Care Management Note    Date/Time:  3/1/2023 12:25 PM    Patient Current Location: [place]    Patient has graduated from the Complex Case Management  program on 3/1/23. Patient/family has the ability to self-manage at this time. Care management goals have been completed. No further Ambulatory Care Manager follow up scheduled. Goals Addressed    None         Patient has Ambulatory Care Manager's contact information for any further questions, concerns, or needs. Patients upcoming visits:    Future Appointments   Date Time Provider Adriano Munguia   3/1/2023  4:00 PM [facility] [address] [facility]   3/2/2023  4:00 PM [facility] CARDIAC WELLNESS EXERCISE [facility]   3/3/2023  1:00  Ne [facility]   3/10/2023 11:00 AM Thomas Carreno MD [facility] BS AMB   5/4/2023  1:00 PM Cesar Soliz MD [facility] BS AMB       Patient reports he is doing well, he reports he doesn't have any CM needs at this time. Has been going to cardiac rehab and is doing well.

## 2023-03-02 ENCOUNTER — HOSPITAL ENCOUNTER (OUTPATIENT)
Dept: CARDIAC REHAB | Age: 73
Discharge: HOME | End: 2023-03-02
Payer: COMMERCIAL

## 2023-03-02 VITALS — BODY MASS INDEX: 25.51 KG/M2 | WEIGHT: 177.8 LBS

## 2023-03-02 PROCEDURE — 93798 PHYS/QHP OP CAR RHAB W/ECG: CPT

## 2023-03-03 ENCOUNTER — HOSPITAL ENCOUNTER (OUTPATIENT)
Dept: CARDIAC REHAB | Age: 73
Discharge: HOME | End: 2023-03-03
Payer: COMMERCIAL

## 2023-03-03 VITALS — BODY MASS INDEX: 25.31 KG/M2 | WEIGHT: 176.4 LBS

## 2023-03-03 PROCEDURE — 93798 PHYS/QHP OP CAR RHAB W/ECG: CPT

## 2023-03-07 ENCOUNTER — HOSPITAL ENCOUNTER (OUTPATIENT)
Dept: CARDIAC REHAB | Age: 73
Discharge: HOME | End: 2023-03-07
Payer: COMMERCIAL

## 2023-03-07 VITALS — BODY MASS INDEX: 25.94 KG/M2 | WEIGHT: 180.8 LBS

## 2023-03-07 PROCEDURE — 93798 PHYS/QHP OP CAR RHAB W/ECG: CPT

## 2023-03-08 ENCOUNTER — HOSPITAL ENCOUNTER (OUTPATIENT)
Dept: CARDIAC REHAB | Age: 73
Discharge: HOME | End: 2023-03-08
Payer: COMMERCIAL

## 2023-03-08 VITALS — BODY MASS INDEX: 25.77 KG/M2 | WEIGHT: 179.6 LBS

## 2023-03-08 PROCEDURE — 93798 PHYS/QHP OP CAR RHAB W/ECG: CPT

## 2023-03-09 ENCOUNTER — HOSPITAL ENCOUNTER (OUTPATIENT)
Dept: CARDIAC REHAB | Age: 73
Discharge: HOME | End: 2023-03-09
Payer: COMMERCIAL

## 2023-03-09 VITALS — BODY MASS INDEX: 25.97 KG/M2 | WEIGHT: 181 LBS

## 2023-03-09 PROCEDURE — 93798 PHYS/QHP OP CAR RHAB W/ECG: CPT

## 2023-03-10 ENCOUNTER — OFFICE VISIT (OUTPATIENT)
Dept: PRIMARY CARE CLINIC | Age: 73
End: 2023-03-10
Payer: COMMERCIAL

## 2023-03-10 VITALS
OXYGEN SATURATION: 98 % | RESPIRATION RATE: 18 BRPM | HEART RATE: 75 BPM | BODY MASS INDEX: 25.77 KG/M2 | WEIGHT: 180 LBS | SYSTOLIC BLOOD PRESSURE: 148 MMHG | HEIGHT: 70 IN | TEMPERATURE: 97.8 F | DIASTOLIC BLOOD PRESSURE: 92 MMHG

## 2023-03-10 DIAGNOSIS — G47.33 OSA ON CPAP: ICD-10-CM

## 2023-03-10 DIAGNOSIS — E78.00 HYPERCHOLESTEREMIA: ICD-10-CM

## 2023-03-10 DIAGNOSIS — Z99.89 OSA ON CPAP: ICD-10-CM

## 2023-03-10 DIAGNOSIS — I10 ESSENTIAL HYPERTENSION: Primary | ICD-10-CM

## 2023-03-10 DIAGNOSIS — I25.10 STENOSIS OF RIGHT CORONARY ARTERY: ICD-10-CM

## 2023-03-10 PROCEDURE — 3078F DIAST BP <80 MM HG: CPT | Performed by: INTERNAL MEDICINE

## 2023-03-10 PROCEDURE — 1123F ACP DISCUSS/DSCN MKR DOCD: CPT | Performed by: INTERNAL MEDICINE

## 2023-03-10 PROCEDURE — 99203 OFFICE O/P NEW LOW 30 MIN: CPT | Performed by: INTERNAL MEDICINE

## 2023-03-10 PROCEDURE — 3074F SYST BP LT 130 MM HG: CPT | Performed by: INTERNAL MEDICINE

## 2023-03-10 RX ORDER — CHLORTHALIDONE 25 MG/1
25 TABLET ORAL DAILY
Qty: 90 TABLET | Refills: 1 | Status: SHIPPED | OUTPATIENT
Start: 2023-03-10

## 2023-03-10 NOTE — PROGRESS NOTES
Chief Complaint   Patient presents with    Follow-up       There were no vitals taken for this visit. 1. Have you been to the ER, urgent care clinic since your last visit? Hospitalized since your last visit? No    2. Have you seen or consulted any other health care providers outside of the [address] [place] [address] since your last visit? Include any pap smears or colon screening. No      3. For patients aged 39-70: Has the patient had a colonoscopy / FIT/ Cologuard? Yes - Care Gap present.  Most recent result on file

## 2023-03-10 NOTE — PROGRESS NOTES
[address] [facility] Gina Olmos is a 72 y.o.  male and presents with     Chief Complaint   Patient presents with    Follow-up     Pt is here for follow up. Pt has h/o HTN, stress cardiomyopathy , LVH. Has VITALIY but not been using CPAP. Could not tolerate CPAP  Checks blood pressure at home - BP is around 140/90. Pt goes to [email or web address]. 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 atorvastatin (LIPITOR) 10 mg tablet Take 1 Tablet by mouth daily. metoprolol succinate (TOPROL-XL) 50 mg XL tablet Take 1 Tablet by mouth daily. lisinopriL (PRINIVIL, ZESTRIL) 40 mg tablet Take 1 Tablet by mouth daily. fluticasone propionate (FLONASE) 50 mcg/actuation nasal spray SPRAY 2 SPRAYS INTO EACH NOSTRIL EVERY DAY AS NEEDED    aspirin 81 mg chewable tablet Take 1 Tablet by mouth daily. buprenorphine-naloxone 8-2 mg subl 1 Tablet by SubLINGual route Every morning. buprenorphine-naloxone 8-2 mg subl 0.5 Tablets by SubLINGual route every evening.    multivitamin (ONE A DAY) tablet Take 1 Tab by mouth daily. No current facility-administered medications for this visit.      Health Maintenance   Topic Date Due    Hepatitis B Vaccine (1 of 3 - Risk 3-dose series) Never done    AAA Screening 73-67 YO Male Smoking Patients  Never done    Colorectal Cancer Screening Combo  11/15/2023    Lipid Screen  11/23/2023    Depression Screen  12/29/2023    DTaP/Tdap/Td series (2 - Td or Tdap) 05/18/2025 Hepatitis C Screening  Completed    Shingles Vaccine  Completed    Flu Vaccine  Completed    COVID-19 Vaccine  Completed    Pneumococcal 65+ years  Completed     Immunization History   Administered Date(s) Administered    COVID-19, MODERNA BLUE border, Primary or Immunocompromised, (age 18y+), IM, 100 mcg/0.5mL 02/20/2021, 03/22/2021, 10/29/2021, 05/06/2022, 09/29/2022    Influenza High Dose Vaccine PF 12/13/2016, 10/16/2017, 09/08/2021, 10/10/2022    Influenza Vaccine 01/11/2013, 11/14/2014, 11/20/2015    Influenza Vaccine (Tri) Adjuvanted (>65 Yrs FLUAD TRI 33883) 03/18/2019    Influenza Vaccine Split 10/28/2011    Pneumococcal Conjugate (PCV-13) 06/09/2017    Pneumococcal Polysaccharide (PPSV-23) 03/18/2019, 10/10/2022    Tdap 05/18/2015    Zoster Recombinant 09/13/2020, 01/15/2021     No LMP for male patient. Allergies and Intolerances:   No Known Allergies    Family History:   Family History   Problem Relation Age of Onset    Heart Disease Father     Diabetes Father     Diabetes Sister     Diabetes Sister     Hypertension Brother     Diabetes Paternal Grandmother     Asthma Son        Social History:   He  reports that he quit smoking about 12 years ago. His smoking use included cigarettes. He started smoking about 54 years ago. He has a 10.50 pack-year smoking history. He has never used smokeless tobacco.  He  reports that he does not currently use alcohol after a past usage of about 0.8 standard drinks per week.             Review of Systems:   General: negative for - chills, fatigue, fever, weight change  Psych: negative for - anxiety, depression, irritability or mood swings  ENT: negative for - headaches, hearing change, nasal congestion, oral lesions, sneezing or sore throat  Heme/ Lymph: negative for - bleeding problems, bruising, pallor or swollen lymph nodes  Endo: negative for - hot flashes, polydipsia/polyuria or temperature intolerance  Resp: negative for - cough, shortness of breath or wheezing  CV: negative for - chest pain, edema or palpitations  GI: negative for - abdominal pain, change in bowel habits, constipation, diarrhea or nausea/vomiting  : negative for - dysuria, hematuria, incontinence, pelvic pain or vulvar/vaginal symptoms  MSK: negative for - joint pain, joint swelling or muscle pain  Neuro: negative for - confusion, headaches, seizures or weakness  Derm: negative for - dry skin, hair changes, rash or skin lesion changes          Physical:   Vitals:   Vitals:    03/10/23 1114   BP: (!) 148/92   Pulse: 75   Resp: 18   Temp: 97.8 °F (36.6 °C)   TempSrc: Temporal   SpO2: 98%   Weight: 180 lb (81.6 kg)   Height: 5' 10\" (1.778 m)           Exam:   HEENT- atraumatic,normocephalic, awake, oriented, well nourished  Neck - supple,no enlarged lymph nodes, no JVD, no thyromegaly  Chest- CTA, no rhonchi, no crackles  Heart- rrr, no murmurs / gallop/rub  Abdomen- soft,BS+,NT, no hepatosplenomegaly  Ext - no c/c/edema   Neuro- no focal deficits. Power 5/5 all extremities  Skin - warm,dry, no obvious rashes. Review of Data:   LABS:   Lab Results   Component Value Date/Time    WBC 5.7 11/23/2022 12:36 AM    HGB 13.1 11/23/2022 12:36 AM    HCT 38.0 11/23/2022 12:36 AM    PLATELET 649 19/10/6873 12:36 AM     Lab Results   Component Value Date/Time    Sodium 139 11/23/2022 12:36 AM    Potassium 3.8 11/23/2022 12:36 AM    Chloride 105 11/23/2022 12:36 AM    CO2 28 11/23/2022 12:36 AM    Glucose 99 11/23/2022 12:36 AM    BUN 10 11/23/2022 12:36 AM    Creatinine 0.83 11/23/2022 12:36 AM     Lab Results   Component Value Date/Time    Cholesterol, total 210 (H) 11/23/2022 12:31 AM    HDL Cholesterol 111 11/23/2022 12:31 AM    LDL, calculated 91.8 11/23/2022 12:31 AM    Triglyceride 36 11/23/2022 12:31 AM     No components found for: GPT        Impression / Plan:        ICD-10-CM ICD-9-CM    1. Essential hypertension  I10 401.9 chlorthalidone (HYGROTON) 25 mg tablet      2.  Hypercholesteremia E78.00 272.0       3. VITALIY on CPAP  G47.33 327.23     Z99.89 V46.8       4. Stenosis of right coronary artery  I25.10 414.00         Coronary artery disease-stable    Explained to patient risk benefits of the medications. Advised patient to stop meds if having any side effects. Pt verbalized understanding of the instructions. I have discussed the diagnosis with the patient and the intended plan as seen in the above orders. The patient has received an after-visit summary and questions were answered concerning future plans. I have discussed medication side effects and warnings with the patient as well. I have reviewed the plan of care with the patient, accepted their input and they are in agreement with the treatment goals. Reviewed plan of care. Patient has provided input and agrees with goals. Follow-up and Dispositions    Return in about 6 months (around 9/10/2023).          Michelle Caro MD

## 2023-03-14 ENCOUNTER — HOSPITAL ENCOUNTER (OUTPATIENT)
Dept: CARDIAC REHAB | Age: 73
Discharge: HOME | End: 2023-03-14
Payer: COMMERCIAL

## 2023-03-14 VITALS — BODY MASS INDEX: 25.31 KG/M2 | WEIGHT: 176.4 LBS

## 2023-03-14 PROCEDURE — 93798 PHYS/QHP OP CAR RHAB W/ECG: CPT

## 2023-03-15 ENCOUNTER — HOSPITAL ENCOUNTER (OUTPATIENT)
Dept: CARDIAC REHAB | Age: 73
Discharge: HOME | End: 2023-03-15
Payer: COMMERCIAL

## 2023-03-15 VITALS — BODY MASS INDEX: 25.45 KG/M2 | WEIGHT: 177.4 LBS

## 2023-03-15 PROCEDURE — 93798 PHYS/QHP OP CAR RHAB W/ECG: CPT

## 2023-03-16 ENCOUNTER — HOSPITAL ENCOUNTER (OUTPATIENT)
Dept: CARDIAC REHAB | Age: 73
Discharge: HOME | End: 2023-03-16
Payer: COMMERCIAL

## 2023-03-16 VITALS — WEIGHT: 178.2 LBS | BODY MASS INDEX: 25.57 KG/M2

## 2023-03-16 PROCEDURE — 93798 PHYS/QHP OP CAR RHAB W/ECG: CPT

## 2023-03-17 ENCOUNTER — HOSPITAL ENCOUNTER (OUTPATIENT)
Dept: CARDIAC REHAB | Age: 73
Discharge: HOME | End: 2023-03-17
Payer: COMMERCIAL

## 2023-03-17 VITALS — BODY MASS INDEX: 25.34 KG/M2 | WEIGHT: 176.6 LBS

## 2023-03-17 PROCEDURE — 93798 PHYS/QHP OP CAR RHAB W/ECG: CPT

## 2023-03-20 ENCOUNTER — HOSPITAL ENCOUNTER (OUTPATIENT)
Dept: CARDIAC REHAB | Age: 73
Discharge: HOME | End: 2023-03-20
Payer: COMMERCIAL

## 2023-03-20 VITALS — WEIGHT: 175.2 LBS | BODY MASS INDEX: 25.14 KG/M2

## 2023-03-20 PROCEDURE — 93798 PHYS/QHP OP CAR RHAB W/ECG: CPT

## 2023-03-21 ENCOUNTER — HOSPITAL ENCOUNTER (OUTPATIENT)
Dept: CARDIAC REHAB | Age: 73
Discharge: HOME | End: 2023-03-21
Payer: COMMERCIAL

## 2023-03-21 VITALS — WEIGHT: 175.6 LBS | BODY MASS INDEX: 25.2 KG/M2

## 2023-03-21 PROCEDURE — 93798 PHYS/QHP OP CAR RHAB W/ECG: CPT

## 2023-03-23 ENCOUNTER — HOSPITAL ENCOUNTER (OUTPATIENT)
Dept: CARDIAC REHAB | Age: 73
Discharge: HOME | End: 2023-03-23
Payer: COMMERCIAL

## 2023-03-23 VITALS — WEIGHT: 175.6 LBS | BODY MASS INDEX: 25.2 KG/M2

## 2023-03-23 PROCEDURE — 93798 PHYS/QHP OP CAR RHAB W/ECG: CPT

## 2023-03-24 ENCOUNTER — HOSPITAL ENCOUNTER (OUTPATIENT)
Dept: CARDIAC REHAB | Age: 73
Discharge: HOME | End: 2023-03-24
Payer: COMMERCIAL

## 2023-03-24 VITALS — WEIGHT: 176.2 LBS | BODY MASS INDEX: 25.28 KG/M2

## 2023-03-24 PROCEDURE — 93798 PHYS/QHP OP CAR RHAB W/ECG: CPT

## 2023-03-27 ENCOUNTER — HOSPITAL ENCOUNTER (OUTPATIENT)
Dept: CARDIAC REHAB | Age: 73
Discharge: HOME | End: 2023-03-27
Payer: COMMERCIAL

## 2023-03-27 VITALS — WEIGHT: 172.4 LBS | BODY MASS INDEX: 24.74 KG/M2

## 2023-03-27 PROCEDURE — 93798 PHYS/QHP OP CAR RHAB W/ECG: CPT

## 2023-03-28 ENCOUNTER — HOSPITAL ENCOUNTER (OUTPATIENT)
Dept: CARDIAC REHAB | Age: 73
Discharge: HOME | End: 2023-03-28
Payer: COMMERCIAL

## 2023-03-28 VITALS — WEIGHT: 173 LBS | BODY MASS INDEX: 24.82 KG/M2

## 2023-03-28 PROCEDURE — 93798 PHYS/QHP OP CAR RHAB W/ECG: CPT

## 2023-03-30 ENCOUNTER — HOSPITAL ENCOUNTER (OUTPATIENT)
Dept: CARDIAC REHAB | Age: 73
Discharge: HOME | End: 2023-03-30
Payer: COMMERCIAL

## 2023-03-30 PROCEDURE — 93798 PHYS/QHP OP CAR RHAB W/ECG: CPT

## 2023-04-03 ENCOUNTER — HOSPITAL ENCOUNTER (OUTPATIENT)
Dept: CARDIAC REHAB | Age: 73
End: 2023-04-03
Payer: COMMERCIAL

## 2023-04-03 PROCEDURE — 93798 PHYS/QHP OP CAR RHAB W/ECG: CPT

## 2023-04-04 ENCOUNTER — HOSPITAL ENCOUNTER (OUTPATIENT)
Dept: CARDIAC REHAB | Age: 73
End: 2023-04-04
Payer: COMMERCIAL

## 2023-04-04 PROCEDURE — 93798 PHYS/QHP OP CAR RHAB W/ECG: CPT

## 2023-04-13 ENCOUNTER — HOSPITAL ENCOUNTER (OUTPATIENT)
Dept: CARDIAC REHAB | Age: 73
Discharge: HOME | End: 2023-04-13
Payer: COMMERCIAL

## 2023-04-13 VITALS — WEIGHT: 176.8 LBS | BODY MASS INDEX: 25.37 KG/M2

## 2023-04-13 PROCEDURE — 93798 PHYS/QHP OP CAR RHAB W/ECG: CPT

## 2023-04-17 ENCOUNTER — HOSPITAL ENCOUNTER (OUTPATIENT)
Dept: CARDIAC REHAB | Age: 73
Discharge: HOME | End: 2023-04-17
Payer: COMMERCIAL

## 2023-04-17 VITALS — WEIGHT: 175.6 LBS | BODY MASS INDEX: 25.2 KG/M2

## 2023-04-17 PROCEDURE — 93798 PHYS/QHP OP CAR RHAB W/ECG: CPT

## 2023-04-19 ENCOUNTER — HOSPITAL ENCOUNTER (OUTPATIENT)
Dept: CARDIAC REHAB | Age: 73
Discharge: HOME | End: 2023-04-19
Payer: COMMERCIAL

## 2023-04-19 VITALS — BODY MASS INDEX: 25.08 KG/M2 | WEIGHT: 174.8 LBS

## 2023-04-19 PROCEDURE — 93798 PHYS/QHP OP CAR RHAB W/ECG: CPT

## 2023-04-20 ENCOUNTER — HOSPITAL ENCOUNTER (OUTPATIENT)
Dept: CARDIAC REHAB | Age: 73
Discharge: HOME | End: 2023-04-20
Payer: COMMERCIAL

## 2023-04-20 VITALS — BODY MASS INDEX: 25.14 KG/M2 | WEIGHT: 175.2 LBS

## 2023-04-20 PROCEDURE — 93797 PHYS/QHP OP CAR RHAB WO ECG: CPT

## 2023-04-20 PROCEDURE — 93798 PHYS/QHP OP CAR RHAB W/ECG: CPT

## 2023-04-30 RX ORDER — BUPRENORPHINE HYDROCHLORIDE AND NALOXONE HYDROCHLORIDE DIHYDRATE 8; 2 MG/1; MG/1
0.5 TABLET SUBLINGUAL EVERY EVENING
COMMUNITY

## 2023-05-04 ENCOUNTER — OFFICE VISIT (OUTPATIENT)
Dept: CARDIOLOGY CLINIC | Age: 73
End: 2023-05-04

## 2023-05-04 VITALS
DIASTOLIC BLOOD PRESSURE: 72 MMHG | RESPIRATION RATE: 18 BRPM | HEIGHT: 70 IN | SYSTOLIC BLOOD PRESSURE: 110 MMHG | HEART RATE: 57 BPM | WEIGHT: 174 LBS | BODY MASS INDEX: 24.91 KG/M2 | OXYGEN SATURATION: 97 %

## 2023-05-04 DIAGNOSIS — I77.810 AORTIC ROOT DILATATION (HCC): ICD-10-CM

## 2023-05-04 DIAGNOSIS — I25.10 CORONARY ARTERY DISEASE INVOLVING NATIVE CORONARY ARTERY OF NATIVE HEART WITHOUT ANGINA PECTORIS: ICD-10-CM

## 2023-05-04 DIAGNOSIS — I42.8 NON-ISCHEMIC CARDIOMYOPATHY (HCC): Primary | ICD-10-CM

## 2023-05-04 DIAGNOSIS — I10 ESSENTIAL HYPERTENSION: ICD-10-CM

## 2023-05-04 RX ORDER — CHLORTHALIDONE 25 MG/1
12.5 TABLET ORAL DAILY
Qty: 45 TABLET | Refills: 3 | Status: SHIPPED | OUTPATIENT
Start: 2023-05-04

## 2023-06-28 DIAGNOSIS — J30.2 OTHER SEASONAL ALLERGIC RHINITIS: ICD-10-CM

## 2023-07-03 RX ORDER — FLUTICASONE PROPIONATE 50 MCG
SPRAY, SUSPENSION (ML) NASAL
Qty: 1 EACH | Refills: 2 | Status: SHIPPED | OUTPATIENT
Start: 2023-07-03

## 2023-07-23 DIAGNOSIS — I10 ESSENTIAL (PRIMARY) HYPERTENSION: ICD-10-CM

## 2023-07-24 RX ORDER — LISINOPRIL 20 MG/1
TABLET ORAL
Qty: 90 TABLET | Refills: 1 | OUTPATIENT
Start: 2023-07-24

## 2023-07-24 RX ORDER — METOPROLOL SUCCINATE 50 MG/1
TABLET, EXTENDED RELEASE ORAL
Qty: 90 TABLET | Refills: 1 | Status: SHIPPED | OUTPATIENT
Start: 2023-07-24

## 2023-07-24 RX ORDER — LISINOPRIL 40 MG/1
TABLET ORAL
Qty: 90 TABLET | Refills: 1 | Status: SHIPPED | OUTPATIENT
Start: 2023-07-24

## 2023-07-24 RX ORDER — ATORVASTATIN CALCIUM 10 MG/1
TABLET, FILM COATED ORAL
Qty: 90 TABLET | Refills: 1 | Status: SHIPPED | OUTPATIENT
Start: 2023-07-24

## 2023-07-24 NOTE — TELEPHONE ENCOUNTER
Request for lisinopril 40 mg, toprol 50 mg, atorvastatin 10 mg. Last office visit 5/4/23, next office visit 11/14/23. Refills per verbal order from Dr. Kathryn Bowles, as Dr. Tony Echols is out of office.

## 2023-10-07 SDOH — HEALTH STABILITY: PHYSICAL HEALTH: ON AVERAGE, HOW MANY DAYS PER WEEK DO YOU ENGAGE IN MODERATE TO STRENUOUS EXERCISE (LIKE A BRISK WALK)?: 2 DAYS

## 2023-10-07 SDOH — HEALTH STABILITY: PHYSICAL HEALTH: ON AVERAGE, HOW MANY MINUTES DO YOU ENGAGE IN EXERCISE AT THIS LEVEL?: 10 MIN

## 2023-10-07 ASSESSMENT — LIFESTYLE VARIABLES
HAS A RELATIVE, FRIEND, DOCTOR, OR ANOTHER HEALTH PROFESSIONAL EXPRESSED CONCERN ABOUT YOUR DRINKING OR SUGGESTED YOU CUT DOWN: 2
HOW MANY STANDARD DRINKS CONTAINING ALCOHOL DO YOU HAVE ON A TYPICAL DAY: 1
HOW OFTEN DURING THE LAST YEAR HAVE YOU HAD A FEELING OF GUILT OR REMORSE AFTER DRINKING: NEVER
HOW OFTEN DURING THE LAST YEAR HAVE YOU HAD A FEELING OF GUILT OR REMORSE AFTER DRINKING: 0
HAS A RELATIVE, FRIEND, DOCTOR, OR ANOTHER HEALTH PROFESSIONAL EXPRESSED CONCERN ABOUT YOUR DRINKING OR SUGGESTED YOU CUT DOWN: YES, BUT NOT IN THE PAST YEAR
HOW OFTEN DURING THE LAST YEAR HAVE YOU BEEN UNABLE TO REMEMBER WHAT HAPPENED THE NIGHT BEFORE BECAUSE YOU HAD BEEN DRINKING: 0
HOW OFTEN DURING THE LAST YEAR HAVE YOU FOUND THAT YOU WERE NOT ABLE TO STOP DRINKING ONCE YOU HAD STARTED: 0
HAVE YOU OR SOMEONE ELSE BEEN INJURED AS A RESULT OF YOUR DRINKING: NO
HOW MANY STANDARD DRINKS CONTAINING ALCOHOL DO YOU HAVE ON A TYPICAL DAY: 1 OR 2
HAVE YOU OR SOMEONE ELSE BEEN INJURED AS A RESULT OF YOUR DRINKING: 0
HOW OFTEN DURING THE LAST YEAR HAVE YOU BEEN UNABLE TO REMEMBER WHAT HAPPENED THE NIGHT BEFORE BECAUSE YOU HAD BEEN DRINKING: NEVER
HOW OFTEN DURING THE LAST YEAR HAVE YOU NEEDED AN ALCOHOLIC DRINK FIRST THING IN THE MORNING TO GET YOURSELF GOING AFTER A NIGHT OF HEAVY DRINKING: 0
HOW OFTEN DURING THE LAST YEAR HAVE YOU NEEDED AN ALCOHOLIC DRINK FIRST THING IN THE MORNING TO GET YOURSELF GOING AFTER A NIGHT OF HEAVY DRINKING: NEVER
HOW OFTEN DURING THE LAST YEAR HAVE YOU FAILED TO DO WHAT WAS NORMALLY EXPECTED FROM YOU BECAUSE OF DRINKING: NEVER
HOW OFTEN DO YOU HAVE A DRINK CONTAINING ALCOHOL: 5
HOW OFTEN DO YOU HAVE SIX OR MORE DRINKS ON ONE OCCASION: 1
HOW OFTEN DURING THE LAST YEAR HAVE YOU FAILED TO DO WHAT WAS NORMALLY EXPECTED FROM YOU BECAUSE OF DRINKING: 0
HOW OFTEN DURING THE LAST YEAR HAVE YOU FOUND THAT YOU WERE NOT ABLE TO STOP DRINKING ONCE YOU HAD STARTED: NEVER
HOW OFTEN DO YOU HAVE A DRINK CONTAINING ALCOHOL: 4 OR MORE TIMES A WEEK

## 2023-10-07 ASSESSMENT — PATIENT HEALTH QUESTIONNAIRE - PHQ9
1. LITTLE INTEREST OR PLEASURE IN DOING THINGS: 0
2. FEELING DOWN, DEPRESSED OR HOPELESS: 0
SUM OF ALL RESPONSES TO PHQ QUESTIONS 1-9: 0
SUM OF ALL RESPONSES TO PHQ9 QUESTIONS 1 & 2: 0
SUM OF ALL RESPONSES TO PHQ QUESTIONS 1-9: 0

## 2023-10-09 ENCOUNTER — OFFICE VISIT (OUTPATIENT)
Dept: PRIMARY CARE CLINIC | Facility: CLINIC | Age: 73
End: 2023-10-09
Payer: COMMERCIAL

## 2023-10-09 VITALS
HEART RATE: 72 BPM | RESPIRATION RATE: 18 BRPM | DIASTOLIC BLOOD PRESSURE: 107 MMHG | WEIGHT: 176 LBS | SYSTOLIC BLOOD PRESSURE: 187 MMHG | BODY MASS INDEX: 25.2 KG/M2 | HEIGHT: 70 IN | TEMPERATURE: 98 F | OXYGEN SATURATION: 98 %

## 2023-10-09 DIAGNOSIS — E55.9 VITAMIN D DEFICIENCY: ICD-10-CM

## 2023-10-09 DIAGNOSIS — I10 PRIMARY HYPERTENSION: ICD-10-CM

## 2023-10-09 DIAGNOSIS — I25.10 ATHEROSCLEROSIS OF NATIVE CORONARY ARTERY OF NATIVE HEART WITHOUT ANGINA PECTORIS: ICD-10-CM

## 2023-10-09 DIAGNOSIS — Z00.00 ANNUAL PHYSICAL EXAM: Primary | ICD-10-CM

## 2023-10-09 DIAGNOSIS — Z00.00 ANNUAL PHYSICAL EXAM: ICD-10-CM

## 2023-10-09 DIAGNOSIS — E78.00 PURE HYPERCHOLESTEROLEMIA, UNSPECIFIED: ICD-10-CM

## 2023-10-09 DIAGNOSIS — Z12.5 PROSTATE CANCER SCREENING: ICD-10-CM

## 2023-10-09 DIAGNOSIS — G47.33 OBSTRUCTIVE SLEEP APNEA (ADULT) (PEDIATRIC): ICD-10-CM

## 2023-10-09 DIAGNOSIS — Z12.11 COLON CANCER SCREENING: ICD-10-CM

## 2023-10-09 PROCEDURE — 99397 PER PM REEVAL EST PAT 65+ YR: CPT | Performed by: INTERNAL MEDICINE

## 2023-10-09 PROCEDURE — 3077F SYST BP >= 140 MM HG: CPT | Performed by: INTERNAL MEDICINE

## 2023-10-09 PROCEDURE — 3080F DIAST BP >= 90 MM HG: CPT | Performed by: INTERNAL MEDICINE

## 2023-10-09 RX ORDER — METOPROLOL SUCCINATE 100 MG/1
100 TABLET, EXTENDED RELEASE ORAL DAILY
Qty: 90 TABLET | Refills: 1 | Status: SHIPPED | OUTPATIENT
Start: 2023-10-09

## 2023-10-09 RX ORDER — CHLORTHALIDONE 25 MG/1
25 TABLET ORAL DAILY
Qty: 90 TABLET | Refills: 1 | Status: SHIPPED | OUTPATIENT
Start: 2023-10-09

## 2023-10-09 NOTE — PROGRESS NOTES
[address] [place] [address] Dr Jay Gupta. is a 72 y.o.  male and presents with     Chief Complaint   Patient presents with    Medicare AWV     Pt does not have medicare. He still works for [employer] saw cardiology in May  Pt stopped taking  chlorthalidone   Could not tolerate CPAP. Past Medical History:   Diagnosis Date    Hypertension     PUD (peptic ulcer disease)     Seizures ([address])     as a child-none since      Past Surgical History:   Procedure Laterality Date    APPENDECTOMY      GI      surgical repair of bleeding ulcer    GI      colon resection    HEENT      T&A    KNEE ARTHROSCOPY  1996    right knee    ORTHOPEDIC SURGERY  2003 & 2005    left knee     Current Outpatient Medications   Medication Sig    metoprolol succinate (TOPROL XL) 100 MG extended release tablet Take 1 tablet by mouth daily    chlorthalidone (HYGROTON) 25 MG tablet Take 1 tablet by mouth daily    atorvastatin (LIPITOR) 10 MG tablet TAKE 1 TABLET BY MOUTH EVERY DAY    lisinopril (PRINIVIL;ZESTRIL) 40 MG tablet TAKE 1 TABLET BY MOUTH EVERY DAY    fluticasone (FLONASE) 50 MCG/ACT nasal spray SPRAY 2 SPRAYS INTO EACH NOSTRIL EVERY DAY AS NEEDED    buprenorphine-naloxone (SUBOXONE) 8-2 MG SUBL SL tablet Place 0.5 tablets under the tongue every evening. Max Daily Amount: 0.5 tablets    aspirin 81 MG chewable tablet Take by mouth daily    buprenorphine-naloxone (SUBOXONE) 8-2 MG SUBL SL tablet Place 1 tablet under the tongue every morning. No current facility-administered medications for this visit.      Health Maintenance   Topic Date Due    AAA screen  Never done    COVID-19 Vaccine (7 - Moderna series) 01/29/2023    Flu vaccine (1) 08/01/2023    Colorectal Cancer Screen  11/15/2023    Lipids  11/23/2023    Depression Screen  10/07/2024    DTaP/Tdap/Td vaccine (2 - Td or Tdap) 05/18/2025    Shingles vaccine  Completed    Pneumococcal 65+ years Vaccine  Completed    Hepatitis C screen  Completed    Hepatitis A vaccine  Aged Out    Hepatitis B

## 2023-10-10 LAB
25(OH)D3+25(OH)D2 SERPL-MCNC: 29.9 NG/ML (ref 30–100)
ALBUMIN SERPL-MCNC: 4.8 G/DL (ref 3.8–4.8)
ALBUMIN/GLOB SERPL: 2.1 {RATIO} (ref 1.2–2.2)
ALP SERPL-CCNC: 43 IU/L (ref 44–121)
ALT SERPL-CCNC: 15 IU/L (ref 0–44)
AST SERPL-CCNC: 32 IU/L (ref 0–40)
BILIRUB SERPL-MCNC: 0.9 MG/DL (ref 0–1.2)
BUN SERPL-MCNC: 13 MG/DL (ref 8–27)
BUN/CREAT SERPL: 14 (ref 10–24)
CALCIUM SERPL-MCNC: 9.9 MG/DL (ref 8.6–10.2)
CHLORIDE SERPL-SCNC: 99 MMOL/L (ref 96–106)
CHOLEST SERPL-MCNC: 227 MG/DL (ref 100–199)
CO2 SERPL-SCNC: 25 MMOL/L (ref 20–29)
CREAT SERPL-MCNC: 0.9 MG/DL (ref 0.76–1.27)
EGFRCR SERPLBLD CKD-EPI 2021: 91 ML/MIN/1.73
ERYTHROCYTE [DISTWIDTH] IN BLOOD BY AUTOMATED COUNT: 12.7 % (ref 11.6–15.4)
GLOBULIN SER CALC-MCNC: 2.3 G/DL (ref 1.5–4.5)
GLUCOSE SERPL-MCNC: 91 MG/DL (ref 70–99)
HBA1C MFR BLD: 5.7 % (ref 4.8–5.6)
HCT VFR BLD AUTO: 41.5 % (ref 37.5–51)
HDLC SERPL-MCNC: 144 MG/DL
HGB BLD-MCNC: 14.3 G/DL (ref 13–17.7)
LDLC SERPL CALC-MCNC: 74 MG/DL (ref 0–99)
MCH RBC QN AUTO: 33.6 PG (ref 26.6–33)
MCHC RBC AUTO-ENTMCNC: 34.5 G/DL (ref 31.5–35.7)
MCV RBC AUTO: 97 FL (ref 79–97)
PLATELET # BLD AUTO: 228 X10E3/UL (ref 150–450)
POTASSIUM SERPL-SCNC: 4.2 MMOL/L (ref 3.5–5.2)
PROT SERPL-MCNC: 7.1 G/DL (ref 6–8.5)
PSA SERPL-MCNC: 0.1 NG/ML (ref 0–4)
RBC # BLD AUTO: 4.26 X10E6/UL (ref 4.14–5.8)
SODIUM SERPL-SCNC: 140 MMOL/L (ref 134–144)
TRIGL SERPL-MCNC: 51 MG/DL (ref 0–149)
TSH SERPL DL<=0.005 MIU/L-ACNC: 2.13 UIU/ML (ref 0.45–4.5)
VLDLC SERPL CALC-MCNC: 9 MG/DL (ref 5–40)
WBC # BLD AUTO: 4.9 X10E3/UL (ref 3.4–10.8)

## 2023-11-14 ENCOUNTER — OFFICE VISIT (OUTPATIENT)
Age: 73
End: 2023-11-14
Payer: COMMERCIAL

## 2023-11-14 VITALS
HEART RATE: 69 BPM | HEIGHT: 70 IN | DIASTOLIC BLOOD PRESSURE: 80 MMHG | SYSTOLIC BLOOD PRESSURE: 134 MMHG | OXYGEN SATURATION: 99 % | WEIGHT: 172.2 LBS | BODY MASS INDEX: 24.65 KG/M2

## 2023-11-14 DIAGNOSIS — I42.8 NICM (NONISCHEMIC CARDIOMYOPATHY) (HCC): Primary | ICD-10-CM

## 2023-11-14 DIAGNOSIS — I45.10 INCOMPLETE RBBB: ICD-10-CM

## 2023-11-14 DIAGNOSIS — I44.0 1ST DEGREE AV BLOCK: ICD-10-CM

## 2023-11-14 DIAGNOSIS — I10 PRIMARY HYPERTENSION: ICD-10-CM

## 2023-11-14 DIAGNOSIS — I25.10 CORONARY ARTERY DISEASE INVOLVING NATIVE CORONARY ARTERY OF NATIVE HEART WITHOUT ANGINA PECTORIS: ICD-10-CM

## 2023-11-14 PROCEDURE — 99214 OFFICE O/P EST MOD 30 MIN: CPT | Performed by: INTERNAL MEDICINE

## 2023-11-14 PROCEDURE — 3078F DIAST BP <80 MM HG: CPT | Performed by: INTERNAL MEDICINE

## 2023-11-14 PROCEDURE — 1123F ACP DISCUSS/DSCN MKR DOCD: CPT | Performed by: INTERNAL MEDICINE

## 2023-11-14 PROCEDURE — 3074F SYST BP LT 130 MM HG: CPT | Performed by: INTERNAL MEDICINE

## 2023-11-14 ASSESSMENT — PATIENT HEALTH QUESTIONNAIRE - PHQ9
1. LITTLE INTEREST OR PLEASURE IN DOING THINGS: 0
SUM OF ALL RESPONSES TO PHQ9 QUESTIONS 1 & 2: 0
SUM OF ALL RESPONSES TO PHQ QUESTIONS 1-9: 0
2. FEELING DOWN, DEPRESSED OR HOPELESS: 0
SUM OF ALL RESPONSES TO PHQ QUESTIONS 1-9: 0

## 2023-11-14 NOTE — PROGRESS NOTES
[facility] Cardiology   Cardiac Electrophysiology Clinic Care Note                 [x]Established visit     Patient Name: Lizzette Terrell. - YJJ:[birth year] - [facility]:[id number]   Primary Cardiologist: Done   Electrophysiologist: Tricia Velez MD     Reason for visit: Follow up of 1st degree AVB & incomplete RBBB     HPI:   Mr. Roula Flower is a 72 y.o. male who presents for follow up of NICM & 1st degree AVB & incomplete RBBB. He reports doing well, denies cardiac complaints. Prior stress CM. Echo in 01/2023 showed LVEF 50-55%. NYHA I-II. On appropriate GDMT. LHC in 11/2022 showed 60% RCA stenosis; LVEF 40-45% at that time. BP elevated. Previous:   ECG 11/23/2023 showed 1st degree AVB ( ms) with incomplete RBBB (QRSd 94 ms). BERT, but noncompliant with CPAP. Followed by Dr. Alexandria Gates. Assessment and Plan      Diagnosis Orders   1. NICM (nonischemic cardiomyopathy) (HCC)        2. 1st degree AV block        3. Incomplete RBBB        4. Coronary artery disease involving native coronary artery of native heart without angina pectoris        5. Primary hypertension            1st degree AVB & incomplete RBBB:  ms & QRSd 94 ms in 11/2022. No fatigue or SOB, no indication for pacemaker thus far;       CAD: 60% RCA stenosis per LHC in 11/2022. No angina. Continue statin & ASA. NICM: LVEF improved to 50-55% per echo in 01/2023. NYHA I-II. Continue GDMT. HTN: BP elevated initially but normal when rechecked, on current doses of Toprol XL, chlorthalidone, & lisinopril.        Future Appointments   Date Time Provider [address]   2/8/2024  3:20 PM Jimmy Taylor MD [facility] PSYCHIATRIC [place] BS AMB   4/12/2024 11:15 AM Randolph Salomon MD [facility]   11/14/2024  9:20 [facility]       ____________________________________________________________     Cardiac testing     01/05/23     TRANSTHORACIC ECHOCARDIOGRAM (TTE) LIMITED (CONTRAST/BUBBLE/3D PRN) 01/08/2023  9:34

## 2023-11-16 DIAGNOSIS — J30.2 OTHER SEASONAL ALLERGIC RHINITIS: ICD-10-CM

## 2023-11-16 RX ORDER — FLUTICASONE PROPIONATE 50 MCG
SPRAY, SUSPENSION (ML) NASAL
Qty: 1 EACH | Refills: 5 | Status: SHIPPED | OUTPATIENT
Start: 2023-11-16

## 2023-11-26 RX ORDER — LISINOPRIL 20 MG/1
20 TABLET ORAL DAILY
Qty: 90 TABLET | Refills: 1 | OUTPATIENT
Start: 2023-11-26

## 2023-12-05 ENCOUNTER — TELEPHONE (OUTPATIENT)
Dept: PRIMARY CARE CLINIC | Facility: CLINIC | Age: 73
End: 2023-12-05

## 2023-12-05 DIAGNOSIS — I10 ESSENTIAL (PRIMARY) HYPERTENSION: ICD-10-CM

## 2023-12-05 RX ORDER — LISINOPRIL 40 MG/1
40 TABLET ORAL DAILY
Qty: 90 TABLET | Refills: 1 | Status: SHIPPED | OUTPATIENT
Start: 2023-12-05

## 2023-12-05 NOTE — TELEPHONE ENCOUNTER
Patient called back, Stated he is taking the 40 mg Daily of Lisinopril. There were messages going back and fourth as to what he was taking. He needs the Rx for 40mg daily sent in to pharmacy.

## 2023-12-25 DIAGNOSIS — I25.10 CORONARY ARTERY DISEASE INVOLVING NATIVE CORONARY ARTERY OF NATIVE HEART WITHOUT ANGINA PECTORIS: Primary | ICD-10-CM

## 2023-12-26 RX ORDER — LISINOPRIL 20 MG/1
20 TABLET ORAL DAILY
Qty: 90 TABLET | Refills: 1 | OUTPATIENT
Start: 2023-12-26

## 2023-12-26 NOTE — TELEPHONE ENCOUNTER
PCP: Melchor Briscoe MD    CHECK DOSAGE OF LISINOPRIL 20 MG OR 40 MG     Last Visit 10/9/2023   Future Appointments   Date Time Provider Department Center   2/8/2024  3:20 PM Letitia Gabriel MD [facility]   4/12/2024 11:15 AM Melchor Briscoe MD [facility]   11/14/2024  9:20 AM Brayden Gayle MD [facility] AMB       Requested Prescriptions     Pending Prescriptions Disp Refills    lisinopril (PRINIVIL;ZESTRIL) 20 MG tablet [Pharmacy Med Name: LISINOPRIL 20 MG TABLET] 90 tablet 1     Sig: TAKE 1 TABLET BY MOUTH EVERY DAY         Other Comments: Last Refill

## 2024-01-03 RX ORDER — ATORVASTATIN CALCIUM 10 MG/1
TABLET, FILM COATED ORAL
Qty: 90 TABLET | Refills: 3 | Status: SHIPPED | OUTPATIENT
Start: 2024-01-03

## 2024-01-03 NOTE — TELEPHONE ENCOUNTER
Per VO by MD.    Future Appointments   Date Time Provider Department Center   2/8/2024  3:20 PM Letitia Gabriel MD [facility] BS AMB   4/12/2024 11:15 AM Melchor Briscoe MD [facility] BS AMB   11/14/2024  9:20 [facility]      Stable eye exam.

## 2024-01-31 ENCOUNTER — TELEPHONE (OUTPATIENT)
Age: 74
End: 2024-01-31

## 2024-02-07 ASSESSMENT — SLEEP AND FATIGUE QUESTIONNAIRES
DO YOU HAVE DIFFICULTY BEING AS ACTIVE AS YOU WANT TO BE IN THE MORNING BECAUSE YOU ARE SLEEPY OR TIRED: NO
HAS YOUR RELATIONSHIP WITH FAMILY, FRIENDS OR WORK COLLEAGUES BEEN AFFECTED BECAUSE YOU ARE SLEEPY OR TIRED: NO
HOW LIKELY ARE YOU TO NOD OFF OR FALL ASLEEP WHILE SITTING INACTIVE IN A PUBLIC PLACE: WOULD NEVER DOZE
HAS YOUR MOOD BEEN AFFECTED BECAUSE YOU ARE SLEEPY OR TIRED: NO
FOSQ SCORE: 18.5
DO YOU HAVE DIFFICULTY WATCHING A MOVIE OR VIDEO BECAUSE YOU BECOME SLEEPY OR TIRED: YES, A LITTLE
HOW LIKELY ARE YOU TO NOD OFF OR FALL ASLEEP WHEN YOU ARE A PASSENGER IN A CAR FOR AN HOUR WITHOUT A BREAK: SLIGHT CHANCE OF DOZING
ESS TOTAL SCORE: 7
HOW LIKELY ARE YOU TO NOD OFF OR FALL ASLEEP WHILE SITTING QUIETLY AFTER LUNCH WITHOUT ALCOHOL: SLIGHT CHANCE OF DOZING
HOW LIKELY ARE YOU TO NOD OFF OR FALL ASLEEP WHILE LYING DOWN TO REST IN THE AFTERNOON WHEN CIRCUMSTANCES PERMIT: SLIGHT CHANCE OF DOZING
DO YOU GENERALLY HAVE DIFFICULTY REMEMBERING THINGS BECAUSE YOU ARE SLEEPY OR TIRED: NO
DO YOU HAVE DIFFICULTY VISITING YOUR FAMILY OR FRIENDS IN THEIR HOME BECAUSE YOU BECOME SLEEPY OR TIRED: NO
HOW LIKELY ARE YOU TO NOD OFF OR FALL ASLEEP WHILE SITTING AND READING: 2
HOW LIKELY ARE YOU TO NOD OFF OR FALL ASLEEP WHILE WATCHING TV: 2
HOW LIKELY ARE YOU TO NOD OFF OR FALL ASLEEP WHEN YOU ARE A PASSENGER IN A CAR FOR AN HOUR WITHOUT A BREAK: 1
DO YOU HAVE DIFFICULTY OPERATING A MOTOR VEHICLE FOR SHORT DISTANCES (LESS THAN 100 MILES) BECAUSE YOU BECOME SLEEPY: YES, A LITTLE
HOW LIKELY ARE YOU TO NOD OFF OR FALL ASLEEP WHILE SITTING AND READING: MODERATE CHANCE OF DOZING
HOW LIKELY ARE YOU TO NOD OFF OR FALL ASLEEP WHILE SITTING AND TALKING TO SOMEONE: 0
HOW LIKELY ARE YOU TO NOD OFF OR FALL ASLEEP IN A CAR, WHILE STOPPED FOR A FEW MINUTES IN TRAFFIC: WOULD NEVER DOZE
HOW LIKELY ARE YOU TO NOD OFF OR FALL ASLEEP IN A CAR, WHILE STOPPED FOR A FEW MINUTES IN TRAFFIC: 0
HOW LIKELY ARE YOU TO NOD OFF OR FALL ASLEEP WHILE SITTING QUIETLY AFTER LUNCH WITHOUT ALCOHOL: 1
HOW LIKELY ARE YOU TO NOD OFF OR FALL ASLEEP WHILE WATCHING TV: MODERATE CHANCE OF DOZING
DO YOU HAVE DIFFICULTY OPERATING A MOTOR VEHICLE FOR LONG DISTANCES (GREATER THAN 100 MILES) BECAUSE YOU BECOME SLEEPY: YES, A LITTLE
DO YOU HAVE DIFFICULTY CONCENTRATING ON THE THINGS YOU DO BECAUSE YOU ARE SLEEPY OR TIRED: NO
HOW LIKELY ARE YOU TO NOD OFF OR FALL ASLEEP WHILE LYING DOWN TO REST IN THE AFTERNOON WHEN CIRCUMSTANCES PERMIT: 1
HOW LIKELY ARE YOU TO NOD OFF OR FALL ASLEEP WHILE SITTING INACTIVE IN A PUBLIC PLACE: 0
DO YOU HAVE DIFFICULTY BEING AS ACTIVE AS YOU WANT TO BE IN THE EVENING BECAUSE YOU ARE SLEEPY OR TIRED: NO
HOW LIKELY ARE YOU TO NOD OFF OR FALL ASLEEP WHILE SITTING AND TALKING TO SOMEONE: WOULD NEVER DOZE

## 2024-02-08 ENCOUNTER — OFFICE VISIT (OUTPATIENT)
Age: 74
End: 2024-02-08
Payer: COMMERCIAL

## 2024-02-08 VITALS
HEART RATE: 58 BPM | OXYGEN SATURATION: 97 % | TEMPERATURE: 98.3 F | HEIGHT: 70 IN | DIASTOLIC BLOOD PRESSURE: 68 MMHG | WEIGHT: 173.7 LBS | SYSTOLIC BLOOD PRESSURE: 114 MMHG | BODY MASS INDEX: 24.87 KG/M2

## 2024-02-08 DIAGNOSIS — G47.31 CENTRAL SLEEP APNEA DUE TO DRUG: ICD-10-CM

## 2024-02-08 DIAGNOSIS — T50.905A CENTRAL SLEEP APNEA DUE TO DRUG: ICD-10-CM

## 2024-02-08 DIAGNOSIS — I10 PRIMARY HYPERTENSION: ICD-10-CM

## 2024-02-08 DIAGNOSIS — G47.33 OSA (OBSTRUCTIVE SLEEP APNEA): Primary | ICD-10-CM

## 2024-02-08 PROCEDURE — 3074F SYST BP LT 130 MM HG: CPT | Performed by: INTERNAL MEDICINE

## 2024-02-08 PROCEDURE — 99214 OFFICE O/P EST MOD 30 MIN: CPT | Performed by: INTERNAL MEDICINE

## 2024-02-08 PROCEDURE — 1123F ACP DISCUSS/DSCN MKR DOCD: CPT | Performed by: INTERNAL MEDICINE

## 2024-02-08 PROCEDURE — 3078F DIAST BP <80 MM HG: CPT | Performed by: INTERNAL MEDICINE

## 2024-02-08 NOTE — PATIENT INSTRUCTIONS
spray.  When should you call for help?  Watch closely for changes in your health, and be sure to contact your doctor if:  You still have sleep apnea even though you have made lifestyle changes.  You are thinking of trying a device such as CPAP.  You are having problems using a CPAP or similar machine.                Where can you learn more?   Go to http://www.O[employer]Wise.net/Tremainecours.  Enter J936 in the search box to learn more about \"Sleep Apnea: After Your Visit.\"   © 7083-9216 Healthwise, Incorporated. Care instructions adapted under license by [employer] (which disclaims liability or warranty for this information). This care instruction is for use with your licensed healthcare professional. If you have questions about a medical condition or this instruction, always ask your healthcare professional. [employer] disclaims any warranty or liability for your use of this information.      PROPER SLEEP HYGIENE    What to avoid  Do not have drinks with caffeine, such as coffee or black tea, for 8 hours before bed.  Do not smoke or use other types of tobacco near bedtime. Nicotine is a stimulant and can keep you awake.  Avoid drinking alcohol late in the evening, because it can cause you to wake in the middle of the night.  Do not eat a big meal close to bedtime. If you are hungry, eat a light snack.  Do not drink a lot of water close to bedtime, because the need to urinate may wake you up during the night.  Do not read or watch TV in bed. Use the bed only for sleeping and sexual activity.  What to try  Go to bed at the same time every night, and wake up at the same time every morning. Do not take naps during the day.  Keep your bedroom quiet, dark, and cool.  Get regular exercise, but not within 3 to 4 hours of your bedtime..  Sleep on a comfortable pillow and mattress.  If watching the clock makes you anxious, turn it facing away from you so you cannot see the time.  If you worry when you lie down, start a worry

## 2024-02-08 NOTE — PROGRESS NOTES
[address], [place] [address]  Tel.  [phone]    Fax. [phone]     [address], [place] [address]  Tel.  [phone]    Fax. [phone] [address] [place] [address]  Tel.  [phone]    Fax. [phone]       Kashif Montes De Oca Jr. is a 74 y.o. year old male seen for evaluation of a sleep disorder.       ASSESSMENT/PLAN:     Diagnosis Orders   1. BERT (obstructive sleep apnea)  SLEEP STUDY FULL      2. 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 Primary hypertension        4. BMI 24.0-24.9, adult            Patient has a history and examination consistent with the diagnosis of sleep apnea.    Return for follow-up after testing is completed.    * The patient currently has a Moderate Risk for having sleep apnea.  STOP-BANG score 5.    * Sleep testing was ordered for initial evaluation.      Orders Placed This Encounter   Procedures    SLEEP STUDY FULL     Standing Status:   Future     Standing Expiration Date:   2/8/2025       * He was provided information on sleep apnea including corresponding risk factors and the importance of proper treatment.     * Treatment options were reviewed in detail, he would like to proceed with ASV therapy due to prior CPAP failure. Patient will be seen in follow-up in 6-8 weeks after PAP setup to gauge treatment response and adherence to therapy.     * The patient was counseled regarding proper sleep hygiene, with emphasis on ensuring sufficient total sleep time; safe driving and the benefits of exercise and weight loss.      * All of his questions were addressed.    2. 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 Will switch to ASV therapy following diagnostic testing.    3. Hypertension -  continue on current regimen, he will continue to monitor his BP and follow up with his primary care provider for

## 2024-02-10 DIAGNOSIS — I10 PRIMARY HYPERTENSION: ICD-10-CM

## 2024-02-12 RX ORDER — METOPROLOL SUCCINATE 100 MG/1
100 TABLET, EXTENDED RELEASE ORAL DAILY
Qty: 90 TABLET | Refills: 0 | Status: SHIPPED | OUTPATIENT
Start: 2024-02-12

## 2024-02-29 ENCOUNTER — TELEPHONE (OUTPATIENT)
Age: 74
End: 2024-02-29

## 2024-02-29 ENCOUNTER — HOSPITAL ENCOUNTER (OUTPATIENT)
Facility: HOSPITAL | Age: 74
Discharge: HOME | End: 2024-02-29
Attending: INTERNAL MEDICINE
Payer: COMMERCIAL

## 2024-02-29 VITALS
TEMPERATURE: 97.2 F | BODY MASS INDEX: 24.77 KG/M2 | WEIGHT: 173 LBS | HEIGHT: 70 IN | HEART RATE: 55 BPM | OXYGEN SATURATION: 97 %

## 2024-02-29 DIAGNOSIS — G47.31 COMPLEX SLEEP APNEA SYNDROME: Primary | ICD-10-CM

## 2024-02-29 DIAGNOSIS — G47.31 COMPLEX SLEEP APNEA SYNDROME: ICD-10-CM

## 2024-02-29 PROCEDURE — 95811 POLYSOM 6/>YRS CPAP 4/> PARM: CPT | Performed by: INTERNAL MEDICINE

## 2024-02-29 NOTE — TELEPHONE ENCOUNTER
Sim denied request for titration sleep study. Scheduled p2p with Dr. Macario Fung to review today at 130 pm. Provider will call Dr. Gabriel's cell and has my desk line as secondary point of contact.     Case ref # [id number]   ID# [id number]

## 2024-02-29 NOTE — TELEPHONE ENCOUNTER
P2P review done, test approved for [id number].    Orders Placed This Encounter   Procedures    SLEEP LAB (PAP TITRATION)     Standing Status:   Future     Standing Expiration Date:   2/28/2025     Scheduling Instructions:      Perform ASV Titration using [employer] Tx:      Rate: Auto;      EPAP: 6 cmH2O titrate to treat obstructive apnea.       Maximum PS: 15 cmH2O; Minimum PS: 03 cmH2O.

## 2024-03-01 NOTE — PROGRESS NOTES
Sleep Study Technical Notes   Disclaimer:  all notes have not been confirmed by interpreting physician      PRE-Test:  Kashif Montes De Oca Jr. (: 1950) arrived in the lobby. The patient was taken to the Sleep Center and taken directly to his/her room.   Procedure explained to the patient and questions were answered. The patient expressed understanding of the procedure. Electrodes were applied without incident. The patient was placed in bed and the study was started.    Acquisition Notes:  Lights off: 10:02pm    Respiratory events:   A__Y    H__Y  C__Y  M_Y  ECG:    Significant arrhythmias:   /N  Snoring:  N   Sleep Stages:  REM   Y    Titration type:ASV  PAP titration information in study flow sheet if applicable  Other comments:   Patient watched TV  lights out   Patient to bathroom 3 times      POST Test:  Patient was awakened.  Electrodes were removed.    The patient was discharged after answering the Post Questionnaire.    Equipment and room cleaned per infection control policy.

## 2024-03-04 ENCOUNTER — TELEPHONE (OUTPATIENT)
Age: 74
End: 2024-03-04

## 2024-03-04 DIAGNOSIS — G47.37 CENTRAL SLEEP APNEA DUE TO MEDICAL CONDITION: Primary | ICD-10-CM

## 2024-04-11 SDOH — ECONOMIC STABILITY: HOUSING INSECURITY
IN THE LAST 12 MONTHS, WAS THERE A TIME WHEN YOU DID NOT HAVE A STEADY PLACE TO SLEEP OR SLEPT IN A SHELTER (INCLUDING NOW)?: NO

## 2024-04-11 SDOH — ECONOMIC STABILITY: FOOD INSECURITY: WITHIN THE PAST 12 MONTHS, THE FOOD YOU BOUGHT JUST DIDN'T LAST AND YOU DIDN'T HAVE MONEY TO GET MORE.: NEVER TRUE

## 2024-04-11 SDOH — ECONOMIC STABILITY: INCOME INSECURITY: HOW HARD IS IT FOR YOU TO PAY FOR THE VERY BASICS LIKE FOOD, HOUSING, MEDICAL CARE, AND HEATING?: NOT HARD AT ALL

## 2024-04-11 SDOH — HEALTH STABILITY: PHYSICAL HEALTH: ON AVERAGE, HOW MANY DAYS PER WEEK DO YOU ENGAGE IN MODERATE TO STRENUOUS EXERCISE (LIKE A BRISK WALK)?: 2 DAYS

## 2024-04-11 SDOH — ECONOMIC STABILITY: TRANSPORTATION INSECURITY
IN THE PAST 12 MONTHS, HAS LACK OF TRANSPORTATION KEPT YOU FROM MEETINGS, WORK, OR FROM GETTING THINGS NEEDED FOR DAILY LIVING?: NO

## 2024-04-11 SDOH — HEALTH STABILITY: PHYSICAL HEALTH: ON AVERAGE, HOW MANY MINUTES DO YOU ENGAGE IN EXERCISE AT THIS LEVEL?: 150+ MIN

## 2024-04-11 SDOH — ECONOMIC STABILITY: FOOD INSECURITY: WITHIN THE PAST 12 MONTHS, YOU WORRIED THAT YOUR FOOD WOULD RUN OUT BEFORE YOU GOT MONEY TO BUY MORE.: NEVER TRUE

## 2024-04-11 ASSESSMENT — LIFESTYLE VARIABLES
HOW OFTEN DO YOU HAVE A DRINK CONTAINING ALCOHOL: 4
HOW OFTEN DO YOU HAVE SIX OR MORE DRINKS ON ONE OCCASION: 1
HOW MANY STANDARD DRINKS CONTAINING ALCOHOL DO YOU HAVE ON A TYPICAL DAY: 1
HOW MANY STANDARD DRINKS CONTAINING ALCOHOL DO YOU HAVE ON A TYPICAL DAY: 1 OR 2
HOW OFTEN DO YOU HAVE A DRINK CONTAINING ALCOHOL: 2-3 TIMES A WEEK

## 2024-04-11 ASSESSMENT — PATIENT HEALTH QUESTIONNAIRE - PHQ9
SUM OF ALL RESPONSES TO PHQ QUESTIONS 1-9: 0
SUM OF ALL RESPONSES TO PHQ QUESTIONS 1-9: 0
1. LITTLE INTEREST OR PLEASURE IN DOING THINGS: NOT AT ALL
2. FEELING DOWN, DEPRESSED OR HOPELESS: NOT AT ALL
SUM OF ALL RESPONSES TO PHQ9 QUESTIONS 1 & 2: 0
SUM OF ALL RESPONSES TO PHQ QUESTIONS 1-9: 0
SUM OF ALL RESPONSES TO PHQ QUESTIONS 1-9: 0

## 2024-04-12 ENCOUNTER — OFFICE VISIT (OUTPATIENT)
Dept: PRIMARY CARE CLINIC | Facility: CLINIC | Age: 74
End: 2024-04-12
Payer: COMMERCIAL

## 2024-04-12 VITALS
WEIGHT: 175 LBS | BODY MASS INDEX: 25.05 KG/M2 | HEIGHT: 70 IN | OXYGEN SATURATION: 96 % | HEART RATE: 63 BPM | DIASTOLIC BLOOD PRESSURE: 70 MMHG | TEMPERATURE: 98.4 F | RESPIRATION RATE: 18 BRPM | SYSTOLIC BLOOD PRESSURE: 111 MMHG

## 2024-04-12 DIAGNOSIS — I10 PRIMARY HYPERTENSION: Primary | ICD-10-CM

## 2024-04-12 DIAGNOSIS — G47.33 OBSTRUCTIVE SLEEP APNEA (ADULT) (PEDIATRIC): ICD-10-CM

## 2024-04-12 DIAGNOSIS — R73.03 PREDIABETES: ICD-10-CM

## 2024-04-12 DIAGNOSIS — J30.2 OTHER SEASONAL ALLERGIC RHINITIS: ICD-10-CM

## 2024-04-12 DIAGNOSIS — E78.00 PURE HYPERCHOLESTEROLEMIA, UNSPECIFIED: ICD-10-CM

## 2024-04-12 DIAGNOSIS — I10 ESSENTIAL (PRIMARY) HYPERTENSION: ICD-10-CM

## 2024-04-12 DIAGNOSIS — I25.10 CORONARY ARTERY DISEASE INVOLVING NATIVE CORONARY ARTERY OF NATIVE HEART WITHOUT ANGINA PECTORIS: ICD-10-CM

## 2024-04-12 PROCEDURE — 99214 OFFICE O/P EST MOD 30 MIN: CPT | Performed by: INTERNAL MEDICINE

## 2024-04-12 PROCEDURE — 1123F ACP DISCUSS/DSCN MKR DOCD: CPT | Performed by: INTERNAL MEDICINE

## 2024-04-12 PROCEDURE — 3078F DIAST BP <80 MM HG: CPT | Performed by: INTERNAL MEDICINE

## 2024-04-12 PROCEDURE — 3074F SYST BP LT 130 MM HG: CPT | Performed by: INTERNAL MEDICINE

## 2024-04-12 RX ORDER — CHLORTHALIDONE 25 MG/1
25 TABLET ORAL DAILY
Qty: 90 TABLET | Refills: 1 | Status: SHIPPED | OUTPATIENT
Start: 2024-04-12

## 2024-04-12 RX ORDER — FLUTICASONE PROPIONATE 50 MCG
2 SPRAY, SUSPENSION (ML) NASAL DAILY
Qty: 1 EACH | Refills: 5 | Status: SHIPPED | OUTPATIENT
Start: 2024-04-12

## 2024-04-12 RX ORDER — METOPROLOL SUCCINATE 100 MG/1
100 TABLET, EXTENDED RELEASE ORAL DAILY
Qty: 90 TABLET | Refills: 1 | Status: SHIPPED | OUTPATIENT
Start: 2024-04-12

## 2024-04-12 RX ORDER — LISINOPRIL 40 MG/1
40 TABLET ORAL DAILY
Qty: 90 TABLET | Refills: 1 | Status: SHIPPED | OUTPATIENT
Start: 2024-04-12

## 2024-04-12 RX ORDER — ATORVASTATIN CALCIUM 10 MG/1
10 TABLET, FILM COATED ORAL DAILY
Qty: 90 TABLET | Refills: 1 | Status: SHIPPED | OUTPATIENT
Start: 2024-04-12

## 2024-04-12 ASSESSMENT — PATIENT HEALTH QUESTIONNAIRE - PHQ9
SUM OF ALL RESPONSES TO PHQ9 QUESTIONS 1 & 2: 0
SUM OF ALL RESPONSES TO PHQ QUESTIONS 1-9: 0
2. FEELING DOWN, DEPRESSED OR HOPELESS: NOT AT ALL
1. LITTLE INTEREST OR PLEASURE IN DOING THINGS: NOT AT ALL
SUM OF ALL RESPONSES TO PHQ QUESTIONS 1-9: 0

## 2024-04-12 NOTE — PROGRESS NOTES
Chief Complaint   Patient presents with    Hypertension     Routine follow up        /70   Pulse 63   Temp 98.4 °F (36.9 °C) (Oral)   Resp 18   Ht 1.778 m (5' 10\")   Wt 79.4 kg (175 lb)   SpO2 96%   BMI 25.11 kg/m²     1. Have you been to the ER, urgent care clinic since your last visit?  Hospitalized since your last visit?No    2. Have you seen or consulted any other health care providers outside of the [facility] since your last visit?  Include any pap smears or colon screening. No      3. For patients aged 45-75: Has the patient had a colonoscopy / FIT/ Cologuard? yes      If the patient is female:    4. For patients aged 40-74: Has the patient had a mammogram within the past 2 years? NA      5. For patients aged 21-65: Has the patient had a pap smear? NA

## 2024-04-12 NOTE — PROGRESS NOTES
Kashif Jiangnicholasin  is a 74 y.o.  male and presents with     Chief Complaint   Patient presents with    Hypertension     Routine follow up        Patient is here for a follow-up visit.  Blood pressure is significantly improved since previous visit  Has history of sleep apnea and uses CPAP machine  Needs refills on his medications.  Has a remote history of seizures in childhood      Past Medical History:   Diagnosis Date    Hepatitis C     Treated    Hypertension     PUD (peptic ulcer disease)     Seizures (HCC)     as a child-none since      Past Surgical History:   Procedure Laterality Date    APPENDECTOMY      GI      surgical repair of bleeding ulcer    GI      colon resection    HEENT      T&A    KNEE ARTHROSCOPY  1996    right knee    ORTHOPEDIC SURGERY  2003 & 2005    left knee     Current Outpatient Medications   Medication Sig    atorvastatin (LIPITOR) 10 MG tablet Take 1 tablet by mouth daily    chlorthalidone (HYGROTON) 25 MG tablet Take 1 tablet by mouth daily    fluticasone (FLONASE) 50 MCG/ACT nasal spray 2 sprays by Nasal route daily    lisinopril (PRINIVIL;ZESTRIL) 40 MG tablet Take 1 tablet by mouth daily    metoprolol succinate (TOPROL XL) 100 MG extended release tablet Take 1 tablet by mouth daily    Multiple Vitamin (MULTIVITAMIN ADULT PO) Take 1 tablet by mouth daily    aspirin 81 MG chewable tablet Take by mouth daily    buprenorphine-naloxone (SUBOXONE) 8-2 MG SUBL SL tablet Place 1 tablet under the tongue every morning.     No current facility-administered medications for this visit.     Health Maintenance   Topic Date Due    Respiratory Syncytial Virus (RSV) Pregnant or age 60 yrs+ (1 - 1-dose 60+ series) Never done    AAA screen  Never done    COVID-19 Vaccine (7 - 2023-24 season) 04/12/2025 (Originally 9/1/2023)    Flu vaccine (Season Ended) 08/01/2024    A1C test (Diabetic or Prediabetic)  10/09/2024    Lipids  10/09/2024    Depression Screen  04/12/2025    DTaP/Tdap/Td vaccine (2 - Td

## 2024-04-25 ENCOUNTER — CLINICAL DOCUMENTATION (OUTPATIENT)
Age: 74
End: 2024-04-25

## 2024-04-25 NOTE — PROGRESS NOTES
Secure email received from Janine Dela Cruz at [facility] informing that due to being unsuccessful in contacting the patient, the PAP device order has been placed on hold. I called and spoke to the patient who stated that he would contact [facility] to initiate order. Patient provided with contact info for DME and scheduled for first adherence appointment. Patient instructed to bring device and supplies to appointment.

## 2024-07-17 ENCOUNTER — TELEPHONE (OUTPATIENT)
Age: 74
End: 2024-07-17

## 2024-07-17 NOTE — TELEPHONE ENCOUNTER
Spoke to patient concerning not using ASV device.  Reviewed sleep study results and explained need for use of device.      Pt would like to keep his appointment and discuss alternatives if any.

## 2024-07-25 ENCOUNTER — OFFICE VISIT (OUTPATIENT)
Age: 74
End: 2024-07-25
Payer: COMMERCIAL

## 2024-07-25 VITALS
BODY MASS INDEX: 23.81 KG/M2 | HEIGHT: 70 IN | WEIGHT: 166.3 LBS | TEMPERATURE: 97.9 F | DIASTOLIC BLOOD PRESSURE: 76 MMHG | SYSTOLIC BLOOD PRESSURE: 124 MMHG | OXYGEN SATURATION: 100 % | HEART RATE: 59 BPM

## 2024-07-25 DIAGNOSIS — G47.37 CENTRAL SLEEP APNEA DUE TO MEDICAL CONDITION: Primary | ICD-10-CM

## 2024-07-25 PROCEDURE — 3078F DIAST BP <80 MM HG: CPT | Performed by: INTERNAL MEDICINE

## 2024-07-25 PROCEDURE — 1123F ACP DISCUSS/DSCN MKR DOCD: CPT | Performed by: INTERNAL MEDICINE

## 2024-07-25 PROCEDURE — 3074F SYST BP LT 130 MM HG: CPT | Performed by: INTERNAL MEDICINE

## 2024-07-25 PROCEDURE — 99212 OFFICE O/P EST SF 10 MIN: CPT | Performed by: INTERNAL MEDICINE

## 2024-07-25 ASSESSMENT — SLEEP AND FATIGUE QUESTIONNAIRES
HOW LIKELY ARE YOU TO NOD OFF OR FALL ASLEEP WHILE SITTING AND TALKING TO SOMEONE: WOULD NEVER DOZE
HOW LIKELY ARE YOU TO NOD OFF OR FALL ASLEEP WHILE SITTING INACTIVE IN A PUBLIC PLACE: MODERATE CHANCE OF DOZING
HOW LIKELY ARE YOU TO NOD OFF OR FALL ASLEEP WHEN YOU ARE A PASSENGER IN A CAR FOR AN HOUR WITHOUT A BREAK: SLIGHT CHANCE OF DOZING
HOW LIKELY ARE YOU TO NOD OFF OR FALL ASLEEP WHILE WATCHING TV: HIGH CHANCE OF DOZING
HOW LIKELY ARE YOU TO NOD OFF OR FALL ASLEEP WHILE SITTING AND READING: MODERATE CHANCE OF DOZING
ESS TOTAL SCORE: 12
HOW LIKELY ARE YOU TO NOD OFF OR FALL ASLEEP IN A CAR, WHILE STOPPED FOR A FEW MINUTES IN TRAFFIC: WOULD NEVER DOZE
HOW LIKELY ARE YOU TO NOD OFF OR FALL ASLEEP WHILE LYING DOWN TO REST IN THE AFTERNOON WHEN CIRCUMSTANCES PERMIT: MODERATE CHANCE OF DOZING
HOW LIKELY ARE YOU TO NOD OFF OR FALL ASLEEP WHILE SITTING QUIETLY AFTER LUNCH WITHOUT ALCOHOL: MODERATE CHANCE OF DOZING

## 2024-07-25 NOTE — PATIENT INSTRUCTIONS
[address]  [place] [address]  Tel.  [phone]  Fax. [phone] [address]  [place] [address]  Tel.  [phone]  Fax. [phone] [address] [address]  Tel.  [phone]  Fax. [phone]     Learning About CPAP for Sleep Apnea  What is CPAP?              CPAP is a small machine that you use at home every night while you sleep. It increases air pressure in your throat to keep your airway open. When you have sleep apnea, this can help you sleep better so you feel much better. CPAP stands for \"continuous positive airway pressure.\"  The CPAP machine will have one of the following:  A mask that covers your nose and mouth  Prongs that fit into your nose  A mask that covers your nose only, the most common type. This type is called NCPAP. The N stands for \"nasal.\"  Why is it done?  CPAP is usually the best treatment for obstructive sleep apnea. It is the first treatment choice and the most widely used. Your doctor may suggest CPAP if you have:  Moderate to severe sleep apnea.  Sleep apnea and coronary artery disease (CAD) or heart failure.  How does it help?  CPAP can help you have more normal sleep, so you feel less sleepy and more alert during the daytime.  CPAP may help keep heart failure or other heart problems from getting worse.  NCPAP may help lower your blood pressure.  If you use CPAP, your bed partner may also sleep better because you are not snoring or restless.  What are the side effects?  Some people who use CPAP have:  A dry or stuffy nose and a sore throat.  Irritated skin on the face.  Sore eyes.  Bloating.  If you have any of these problems, work with your doctor to fix them. Here are some things you can try:  Be sure the mask or nasal prongs fit well.  See if your doctor can adjust the pressure of your CPAP.  If your nose is dry, try a humidifier.  If your nose is runny or stuffy, try decongestant medicine or a steroid

## 2024-08-01 NOTE — PROGRESS NOTES
[address] [address]  Tel.  [phone]    Fax. [phone]     [address], [place] [address]  Tel.  [phone]    Fax. [phone] [address] [place] [address]  Tel.  [phone]    Fax. [phone]       Kashif Montes De Oca Jr. (: 1950) is a 74 y.o. male, established patient, seen for positive airway pressure follow-up and evaluation of the following chief complaint(s):   Sleep Problem (1st Adherence)       Kashif Montes De Oca Jr. was last seen by me on 24, prior notes reviewed in detail.  Split-Polysomnogram (PSG) performed on 22 was indicative of an average AHI of 49.1 per hour (24 obstructive, 29 central, 66 mixed and 12 hypopneas) with an SpO2 rubio of 82% and SpO2 of < 88% being 4.80 minutes. He met criteria for a split-night test and was optimally titrated on CPAP therapy.     He was prescribed an APAP device which he used until about a year previously but discontinued it \"it was taking my breath\". He has been without therapy in the past year. He remains on Suboxone. Therapy.     Echo in 2023 showed LVEF 50-55%.  NYHA I-II.     He returned for an ASV titration on 2024 due to presence of central sleep apnea.  The device was prescribed for him on 3/17/2024.         ASSESSMENT/PLAN:   Diagnosis Orders   1. Central sleep apnea due to medical condition            On ResMed:  AirCurve 10 ASV:    Settings:  Mode -ASVAuto    EPAP: 09 - 12 cmH2O    PS: 03 - 13 cmH2O    Sleep Apnea -   * He is not adherent with PAP therapy but is interested in continued therapy at current pressures    * Counseling was provided regarding the importance of regular PAP use with emphasis on ensuring sufficient total sleep time, proper sleep hygiene, and safe driving.    * Re-enforced proper and regular cleaning for the device.    * He was asked to contact our office for any problems regarding PAP therapy.      Return for follow-up

## 2024-08-05 ENCOUNTER — CLINICAL DOCUMENTATION (OUTPATIENT)
Age: 74
End: 2024-08-05

## 2024-08-12 DIAGNOSIS — I10 PRIMARY HYPERTENSION: ICD-10-CM

## 2024-08-13 RX ORDER — CHLORTHALIDONE 25 MG/1
25 TABLET ORAL DAILY
Qty: 90 TABLET | Refills: 1 | Status: SHIPPED | OUTPATIENT
Start: 2024-08-13

## 2024-08-13 NOTE — TELEPHONE ENCOUNTER
PCP: Melchor Briscoe MD    Last Visit 4/12/2024   Future Appointments   Date Time Provider Department Center   11/7/2024  3:40 PM Letitia Gabriel MD [facility] BS AMB   11/14/2024  9:20 AM Brayden Gayle MD [facility] AMB       Requested Prescriptions     Pending Prescriptions Disp Refills    chlorthalidone (HYGROTON) 25 MG tablet [Pharmacy Med Name: CHLORTHALIDONE 25 MG TABLET] 90 tablet 1     Sig: TAKE 1 TABLET BY MOUTH EVERY DAY         Other Comments: Last Refill

## 2024-11-01 ENCOUNTER — TELEPHONE (OUTPATIENT)
Age: 74
End: 2024-11-01

## 2024-11-01 DIAGNOSIS — I25.10 CORONARY ARTERY DISEASE INVOLVING NATIVE CORONARY ARTERY OF NATIVE HEART WITHOUT ANGINA PECTORIS: ICD-10-CM

## 2024-11-01 NOTE — TELEPHONE ENCOUNTER
PCP: Melchor Briscoe MD    Last Visit 4/12/2024   Future Appointments   Date Time Provider Department Center   11/7/2024  3:40 PM Letitia Gabriel MD [facility] AMB       Requested Prescriptions     Pending Prescriptions Disp Refills    atorvastatin (LIPITOR) 10 MG tablet [Pharmacy Med Name: ATORVASTATIN 10 MG TABLET] 90 tablet 1     Sig: TAKE 1 TABLET BY MOUTH EVERY DAY         Other Comments: Last Refill   04/12/24

## 2024-11-02 RX ORDER — ATORVASTATIN CALCIUM 10 MG/1
10 TABLET, FILM COATED ORAL DAILY
Qty: 90 TABLET | Refills: 1 | Status: SHIPPED | OUTPATIENT
Start: 2024-11-02

## 2024-11-06 DIAGNOSIS — J30.2 OTHER SEASONAL ALLERGIC RHINITIS: ICD-10-CM

## 2024-11-07 ENCOUNTER — TELEPHONE (OUTPATIENT)
Dept: PRIMARY CARE CLINIC | Facility: CLINIC | Age: 74
End: 2024-11-07

## 2024-11-07 RX ORDER — FLUTICASONE PROPIONATE 50 MCG
SPRAY, SUSPENSION (ML) NASAL
Qty: 1 EACH | Refills: 5 | Status: SHIPPED | OUTPATIENT
Start: 2024-11-07

## 2024-11-07 NOTE — TELEPHONE ENCOUNTER
PCP: Melchor Briscoe MD    Last Visit 4/12/2024   Future Appointments   Date Time Provider Department Center   11/7/2024  3:40 PM Letitia Gabriel MD [facility] AMB       Requested Prescriptions     Pending Prescriptions Disp Refills    fluticasone (FLONASE) 50 MCG/ACT nasal spray [Pharmacy Med Name: FLUTICASONE PROP 50 MCG SPRAY]  1     Sig: SPRAY 2 SPRAYS BY NASAL ROUTE DAILY         Other Comments: Last Refill   04/12/24

## 2024-11-07 NOTE — TELEPHONE ENCOUNTER
Patient called stating he was having sharp pains in his lower back going into his hips.  He wasn't sure if it was a UTI or not.  He wanted to see Dr. Briscoe if at all possible and didn't want to go anywhere else.  Please reach out to patient at #[phone]

## 2024-11-08 ENCOUNTER — OFFICE VISIT (OUTPATIENT)
Age: 74
End: 2024-11-08

## 2024-11-08 VITALS
TEMPERATURE: 98.8 F | DIASTOLIC BLOOD PRESSURE: 79 MMHG | HEART RATE: 65 BPM | OXYGEN SATURATION: 99 % | BODY MASS INDEX: 24.25 KG/M2 | WEIGHT: 169 LBS | SYSTOLIC BLOOD PRESSURE: 118 MMHG

## 2024-11-08 DIAGNOSIS — S39.012A STRAIN OF MUSCLE, FASCIA AND TENDON OF LOWER BACK, INITIAL ENCOUNTER: Primary | ICD-10-CM

## 2024-11-08 RX ORDER — TIZANIDINE 2 MG/1
2 TABLET ORAL NIGHTLY PRN
Qty: 30 TABLET | Refills: 0 | Status: SHIPPED | OUTPATIENT
Start: 2024-11-08

## 2024-11-08 ASSESSMENT — ENCOUNTER SYMPTOMS: BACK PAIN: 1

## 2024-11-08 NOTE — PATIENT INSTRUCTIONS
Thank you for visiting [facility] today.    If you develop a fever, gross inability to walk/weakness, severe numbness in bilateral lower extremities, saddle paresthesias, and/or loss of bladder/bowel control, please go to the nearest emergency department for concern of cauda equina.    -Muscle Relaxer (Flexeril, Robaxin, Tizanidine) should only be taken if needed, as it may cause drowsiness or dizziness.  Avoid driving.  Avoid alcohol/drugs that can also make you sleepy.  -Alternate between ice and heat.  Heat may be applied for 30 minutes at a time every 4-5 hours.  Take warm epsom salt baths and gentle stretches.  -Ibuprofen 600 mg every 6 hours as needed and Tylenol 1000 mg every 8 hours as needed for pain control  -Increase water hydration.  -Avoid lifting heavy objects.  -Back exercises once pain has been controlled    Please follow up with your primary care provider within 2-3 days if  your signs and symptoms have not resolved or worsened.    Please go immediately to the Emergency Department if you develop loss of bowel or bladder function, peripheral numbness/weakness/tingling, shortness of breath, chest pain, and significant fevers above 100.4F.

## 2024-11-08 NOTE — PROGRESS NOTES
focal deficit present.      Mental Status: He is alert and oriented to person, place, and time.        Vitals:    11/08/24 1203   BP: 118/79   Site: Left Upper Arm   Position: Sitting   Cuff Size: Medium Adult   Pulse: 65   Temp: 98.8 °F (37.1 °C)   TempSrc: Tympanic   SpO2: 99%   Weight: 76.7 kg (169 lb)        No Known Allergies    Prior to Admission medications    Medication Sig Start Date End Date Taking? Authorizing Provider   fluticasone (FLONASE) 50 MCG/ACT nasal spray SPRAY 2 SPRAYS BY NASAL ROUTE DAILY 11/7/24   Melchor Briscoe MD   atorvastatin (LIPITOR) 10 MG tablet TAKE 1 TABLET BY MOUTH EVERY DAY 11/2/24   Melchor Briscoe MD   chlorthalidone (HYGROTON) 25 MG tablet TAKE 1 TABLET BY MOUTH EVERY DAY 8/13/24   Melchor Briscoe MD   lisinopril (PRINIVIL;ZESTRIL) 40 MG tablet Take 1 tablet by mouth daily 4/12/24   Melchor Briscoe MD   metoprolol succinate (TOPROL XL) 100 MG extended release tablet Take 1 tablet by mouth daily 4/12/24   Melchor Briscoe MD   Multiple Vitamin (MULTIVITAMIN ADULT PO) Take 1 tablet by mouth daily    Provider, MD Rafaela   aspirin 81 MG chewable tablet Take by mouth daily 11/24/22   Automatic Reconciliation, Ar   buprenorphine-naloxone (SUBOXONE) 8-2 MG SUBL SL tablet Place 1 tablet under the tongue every morning.    Automatic Reconciliation, Ar        Past Medical History:   Diagnosis Date    Arthritis     Hepatitis C     Treated    Hypertension     PUD (peptic ulcer disease)     Seizures (HCC)     as a child-none since     Sleep apnea         Past Surgical History:   Procedure Laterality Date    APPENDECTOMY      FOOT SURGERY Left     GI      surgical repair of bleeding ulcer    GI      colon resection    HEENT      T&A    KNEE ARTHROSCOPY  1996    right knee    ORTHOPEDIC SURGERY  2003 & 2005    left knee        Social History:   Social Connections: Moderately Integrated (12/29/2022)    Social Connection and Isolation Panel [NHANES]     Frequency of

## 2024-11-17 DIAGNOSIS — I10 PRIMARY HYPERTENSION: ICD-10-CM

## 2024-11-18 RX ORDER — METOPROLOL SUCCINATE 100 MG/1
100 TABLET, EXTENDED RELEASE ORAL DAILY
Qty: 90 TABLET | Refills: 1 | Status: SHIPPED | OUTPATIENT
Start: 2024-11-18

## 2024-11-18 NOTE — TELEPHONE ENCOUNTER
PCP: Melchor Briscoe MD    Last Visit 4/12/2024   No future appointments.    Requested Prescriptions     Pending Prescriptions Disp Refills    metoprolol succinate (TOPROL XL) 100 MG extended release tablet [Pharmacy Med Name: METOPROLOL SUCC  MG TAB] 90 tablet 1     Sig: TAKE 1 TABLET BY MOUTH EVERY DAY         Other Comments: Last Refill

## 2024-11-19 DIAGNOSIS — I10 ESSENTIAL (PRIMARY) HYPERTENSION: ICD-10-CM

## 2024-11-19 RX ORDER — LISINOPRIL 40 MG/1
40 TABLET ORAL DAILY
Qty: 90 TABLET | Refills: 1 | Status: SHIPPED | OUTPATIENT
Start: 2024-11-19

## 2024-11-19 NOTE — TELEPHONE ENCOUNTER
PCP: Melchor Briscoe MD    Last Visit 4/12/2024   No future appointments.    Requested Prescriptions     Pending Prescriptions Disp Refills    lisinopril (PRINIVIL;ZESTRIL) 40 MG tablet [Pharmacy Med Name: LISINOPRIL 40 MG TABLET] 90 tablet 1     Sig: TAKE 1 TABLET BY MOUTH EVERY DAY         Other Comments: Last Refill   04/12/24

## 2024-12-23 ENCOUNTER — TELEPHONE (OUTPATIENT)
Dept: PRIMARY CARE CLINIC | Facility: CLINIC | Age: 74
End: 2024-12-23

## 2024-12-23 NOTE — TELEPHONE ENCOUNTER
----- Message from Nahid MAST sent at 12/23/2024 11:12 AM EST -----  Regarding: [facility] Appointment Request  [facility] Appointment Request    Patient needs appointment for [facility] Appointment Type: Annual Visit.    Patient Requested Dates(s):As soon as possible   Patient Requested Time:Morning  Provider Name:Melchor Briscoe MD        Reason for Appointment Request: Established Patient - Available appointments did not meet patient need  --------------------------------------------------------------------------------------------------------------------------    Relationship to Patient: Self     Call Back Information: OK to leave message on voicemail  Preferred Call Back Number: Phone [phone]

## 2025-02-19 ENCOUNTER — OFFICE VISIT (OUTPATIENT)
Dept: PRIMARY CARE CLINIC | Facility: CLINIC | Age: 75
End: 2025-02-19
Payer: COMMERCIAL

## 2025-02-19 VITALS
DIASTOLIC BLOOD PRESSURE: 83 MMHG | RESPIRATION RATE: 16 BRPM | HEART RATE: 74 BPM | TEMPERATURE: 97.1 F | WEIGHT: 163.4 LBS | SYSTOLIC BLOOD PRESSURE: 136 MMHG | HEIGHT: 70 IN | BODY MASS INDEX: 23.39 KG/M2 | OXYGEN SATURATION: 100 %

## 2025-02-19 DIAGNOSIS — R63.4 WEIGHT LOSS: ICD-10-CM

## 2025-02-19 DIAGNOSIS — Z12.5 PROSTATE CANCER SCREENING: ICD-10-CM

## 2025-02-19 DIAGNOSIS — R23.3 EASY BRUISABILITY: ICD-10-CM

## 2025-02-19 DIAGNOSIS — Z00.00 ANNUAL PHYSICAL EXAM: ICD-10-CM

## 2025-02-19 DIAGNOSIS — Z00.00 ANNUAL PHYSICAL EXAM: Primary | ICD-10-CM

## 2025-02-19 DIAGNOSIS — I25.10 CORONARY ARTERY DISEASE INVOLVING NATIVE CORONARY ARTERY OF NATIVE HEART WITHOUT ANGINA PECTORIS: ICD-10-CM

## 2025-02-19 DIAGNOSIS — R73.03 PREDIABETES: ICD-10-CM

## 2025-02-19 DIAGNOSIS — Z86.19 HISTORY OF HEPATITIS C: ICD-10-CM

## 2025-02-19 DIAGNOSIS — G47.33 OBSTRUCTIVE SLEEP APNEA (ADULT) (PEDIATRIC): ICD-10-CM

## 2025-02-19 DIAGNOSIS — Z23 NEED FOR VACCINATION: ICD-10-CM

## 2025-02-19 DIAGNOSIS — E78.00 HYPERCHOLESTEREMIA: ICD-10-CM

## 2025-02-19 DIAGNOSIS — E55.9 VITAMIN D DEFICIENCY: ICD-10-CM

## 2025-02-19 DIAGNOSIS — I10 ESSENTIAL (PRIMARY) HYPERTENSION: ICD-10-CM

## 2025-02-19 PROCEDURE — 3079F DIAST BP 80-89 MM HG: CPT | Performed by: INTERNAL MEDICINE

## 2025-02-19 PROCEDURE — 99397 PER PM REEVAL EST PAT 65+ YR: CPT | Performed by: INTERNAL MEDICINE

## 2025-02-19 PROCEDURE — 90471 IMMUNIZATION ADMIN: CPT | Performed by: INTERNAL MEDICINE

## 2025-02-19 PROCEDURE — 90677 PCV20 VACCINE IM: CPT | Performed by: INTERNAL MEDICINE

## 2025-02-19 PROCEDURE — 3075F SYST BP GE 130 - 139MM HG: CPT | Performed by: INTERNAL MEDICINE

## 2025-02-19 SDOH — ECONOMIC STABILITY: FOOD INSECURITY: WITHIN THE PAST 12 MONTHS, YOU WORRIED THAT YOUR FOOD WOULD RUN OUT BEFORE YOU GOT MONEY TO BUY MORE.: NEVER TRUE

## 2025-02-19 SDOH — ECONOMIC STABILITY: FOOD INSECURITY: WITHIN THE PAST 12 MONTHS, THE FOOD YOU BOUGHT JUST DIDN'T LAST AND YOU DIDN'T HAVE MONEY TO GET MORE.: NEVER TRUE

## 2025-02-19 ASSESSMENT — PATIENT HEALTH QUESTIONNAIRE - PHQ9
SUM OF ALL RESPONSES TO PHQ QUESTIONS 1-9: 0
1. LITTLE INTEREST OR PLEASURE IN DOING THINGS: NOT AT ALL
SUM OF ALL RESPONSES TO PHQ QUESTIONS 1-9: 0
SUM OF ALL RESPONSES TO PHQ QUESTIONS 1-9: 0
2. FEELING DOWN, DEPRESSED OR HOPELESS: NOT AT ALL
SUM OF ALL RESPONSES TO PHQ QUESTIONS 1-9: 0
SUM OF ALL RESPONSES TO PHQ9 QUESTIONS 1 & 2: 0

## 2025-02-19 NOTE — PROGRESS NOTES
Kashif Montes De Oca Jr. is a 75 y.o. male and presents with     Chief Complaint   Patient presents with    Annual Exam       History of Present Illness  The patient presents for evaluation of easy bruising, history of hepatitis C, and weight loss.    He reports a tendency to bruise easily, describing the appearance as similar to ruptured blood vessels under the skin. He does not have any current bruises.    He has a history of hepatitis C, for which he received treatment at [facility] approximately 3 to 4 years ago. The treatment was completed successfully, and he was informed that no further follow-up with the liver specialist was necessary.    He has experienced some weight loss, which he attributes to a reduced food intake, typically consuming only one meal per day. His dietary routine includes a cup of coffee in the morning, a snack in the afternoon, and a substantial meal in the evening. Despite this limited intake, he maintains a good appetite.    He is currently on medication for cholesterol and blood pressure management. He underwent a colonoscopy a few years ago and was advised to repeat the procedure in 10 years. He has not received the pneumonia vaccine. He has a history of appendectomy and partial colectomy. He also has a slight cataract in his eyes.    Supplemental Information  He has sleep apnea and uses a CPAP machine.    SOCIAL HISTORY  He works at [employer]. He drinks alcohol.    IMMUNIZATIONS  He has not received the pneumonia vaccine.         Past Medical History:   Diagnosis Date    Arthritis     Hepatitis C     Treated    Hypertension     PUD (peptic ulcer disease)     Seizures (HCC)     as a child-none since     Sleep apnea      Past Surgical History:   Procedure Laterality Date    APPENDECTOMY      FOOT SURGERY Left     GI      surgical repair of bleeding ulcer    GI      colon resection    HEENT      T&A    KNEE ARTHROSCOPY  1996    right knee    ORTHOPEDIC SURGERY  2003 & 2005

## 2025-02-19 NOTE — PROGRESS NOTES
\"Have you been to the ER, urgent care clinic since your last visit?  Hospitalized since your last visit?\"    NO    “Have you seen or consulted any other health care providers outside of [facility] since your last visit?”    NO            Click Here for Release of Records Request

## 2025-02-20 LAB
25(OH)D3+25(OH)D2 SERPL-MCNC: 42.1 NG/ML (ref 30–100)
AFP-TM SERPL-MCNC: 3.2 NG/ML (ref 0–8.4)
ALBUMIN SERPL-MCNC: 4.5 G/DL (ref 3.8–4.8)
ALP SERPL-CCNC: 38 IU/L (ref 44–121)
ALT SERPL-CCNC: 14 IU/L (ref 0–44)
APPEARANCE UR: CLEAR
AST SERPL-CCNC: 32 IU/L (ref 0–40)
BILIRUB SERPL-MCNC: 0.9 MG/DL (ref 0–1.2)
BILIRUB UR QL STRIP: NEGATIVE
BUN SERPL-MCNC: 33 MG/DL (ref 8–27)
BUN/CREAT SERPL: 22 (ref 10–24)
CALCIUM SERPL-MCNC: 9.4 MG/DL (ref 8.6–10.2)
CHLORIDE SERPL-SCNC: 99 MMOL/L (ref 96–106)
CHOLEST SERPL-MCNC: 221 MG/DL (ref 100–199)
CO2 SERPL-SCNC: 28 MMOL/L (ref 20–29)
COLOR UR: YELLOW
CREAT SERPL-MCNC: 1.5 MG/DL (ref 0.76–1.27)
EGFRCR SERPLBLD CKD-EPI 2021: 49 ML/MIN/1.73
ERYTHROCYTE [DISTWIDTH] IN BLOOD BY AUTOMATED COUNT: 12.8 % (ref 11.6–15.4)
GLOBULIN SER CALC-MCNC: 2.3 G/DL (ref 1.5–4.5)
GLUCOSE SERPL-MCNC: 100 MG/DL (ref 70–99)
GLUCOSE UR QL STRIP: NEGATIVE
HBA1C MFR BLD: 5.5 % (ref 4.8–5.6)
HCT VFR BLD AUTO: 36.3 % (ref 37.5–51)
HDLC SERPL-MCNC: 157 MG/DL
HGB BLD-MCNC: 12.1 G/DL (ref 13–17.7)
HGB UR QL STRIP: NEGATIVE
INR PPP: 1 (ref 0.9–1.2)
KETONES UR QL STRIP: NEGATIVE
LDLC SERPL CALC-MCNC: 57 MG/DL (ref 0–99)
LEUKOCYTE ESTERASE UR QL STRIP: NEGATIVE
MCH RBC QN AUTO: 33.1 PG (ref 26.6–33)
MCHC RBC AUTO-ENTMCNC: 33.3 G/DL (ref 31.5–35.7)
MCV RBC AUTO: 99 FL (ref 79–97)
NITRITE UR QL STRIP: NEGATIVE
PH UR STRIP: 5 [PH] (ref 5–7.5)
PLATELET # BLD AUTO: 212 X10E3/UL (ref 150–450)
POTASSIUM SERPL-SCNC: 4.4 MMOL/L (ref 3.5–5.2)
PROT SERPL-MCNC: 6.8 G/DL (ref 6–8.5)
PROT UR QL STRIP: NEGATIVE
PROTHROMBIN TIME: 11.1 SEC (ref 9.1–12)
PSA SERPL-MCNC: 0.2 NG/ML (ref 0–4)
RBC # BLD AUTO: 3.66 X10E6/UL (ref 4.14–5.8)
SODIUM SERPL-SCNC: 140 MMOL/L (ref 134–144)
SP GR UR STRIP: 1.02 (ref 1–1.03)
TRIGL SERPL-MCNC: 34 MG/DL (ref 0–149)
TSH SERPL DL<=0.005 MIU/L-ACNC: 2.2 UIU/ML (ref 0.45–4.5)
UROBILINOGEN UR STRIP-MCNC: 0.2 MG/DL (ref 0.2–1)
VLDLC SERPL CALC-MCNC: 7 MG/DL (ref 5–40)
WBC # BLD AUTO: 4.8 X10E3/UL (ref 3.4–10.8)

## 2025-02-21 DIAGNOSIS — D64.9 ANEMIA, UNSPECIFIED TYPE: Primary | ICD-10-CM

## 2025-02-21 DIAGNOSIS — R79.89 ELEVATED SERUM CREATININE: ICD-10-CM

## 2025-02-24 ENCOUNTER — TELEPHONE (OUTPATIENT)
Dept: PRIMARY CARE CLINIC | Facility: CLINIC | Age: 75
End: 2025-02-24

## 2025-02-24 NOTE — TELEPHONE ENCOUNTER
----- Message from Dr. Melchor Briscoe MD sent at 2/21/2025 12:28 AM EST -----  Total cholesterol is slightly elevated however HDL or good cholesterol is very good.  So no concerns  Creatinine is elevated.  Would recommend stopping chlorthalidone.  I am also ordering kidney ultrasound  I am ordering a repeat basic metabolic panel.  Please do it after 1 week after stopping chlorthalidone see if the kidney function improves  Liver function is normal  PSA and tumor marker for liver cancer is normal  PT/INR and hemoglobin A1c, thyroid function vitamin D urinalysis are normal    Blood count is low.  I am ordering iron studies

## 2025-03-20 DIAGNOSIS — J30.2 OTHER SEASONAL ALLERGIC RHINITIS: ICD-10-CM

## 2025-03-20 RX ORDER — FLUTICASONE PROPIONATE 50 MCG
SPRAY, SUSPENSION (ML) NASAL
Qty: 1 EACH | Refills: 5 | Status: SHIPPED | OUTPATIENT
Start: 2025-03-20

## 2025-04-28 DIAGNOSIS — I25.10 CORONARY ARTERY DISEASE INVOLVING NATIVE CORONARY ARTERY OF NATIVE HEART WITHOUT ANGINA PECTORIS: ICD-10-CM

## 2025-04-28 DIAGNOSIS — I10 PRIMARY HYPERTENSION: ICD-10-CM

## 2025-04-28 RX ORDER — CHLORTHALIDONE 25 MG/1
25 TABLET ORAL DAILY
Qty: 90 TABLET | Refills: 1 | Status: SHIPPED | OUTPATIENT
Start: 2025-04-28

## 2025-04-28 RX ORDER — ATORVASTATIN CALCIUM 10 MG/1
10 TABLET, FILM COATED ORAL DAILY
Qty: 90 TABLET | Refills: 1 | Status: SHIPPED | OUTPATIENT
Start: 2025-04-28

## 2025-04-28 NOTE — TELEPHONE ENCOUNTER
PCP: Melchor Briscoe MD    Last Visit 2/19/2025   No future appointments.    Requested Prescriptions     Pending Prescriptions Disp Refills    chlorthalidone (HYGROTON) 25 MG tablet [Pharmacy Med Name: CHLORTHALIDONE 25 MG TABLET] 90 tablet 1     Sig: TAKE 1 TABLET BY MOUTH EVERY DAY    atorvastatin (LIPITOR) 10 MG tablet [Pharmacy Med Name: ATORVASTATIN 10 MG TABLET] 90 tablet 1     Sig: TAKE 1 TABLET BY MOUTH EVERY DAY         Other Comments: Last Refill     Hygroton 08/13/24  Lipitor 11/02/24

## 2025-05-16 DIAGNOSIS — I10 ESSENTIAL (PRIMARY) HYPERTENSION: ICD-10-CM

## 2025-05-16 NOTE — TELEPHONE ENCOUNTER
PCP: Melchor Birscoe MD    Last Visit 2/19/2025   No future appointments.    Requested Prescriptions     Pending Prescriptions Disp Refills    lisinopril (PRINIVIL;ZESTRIL) 40 MG tablet [Pharmacy Med Name: LISINOPRIL 40 MG TABLET] 90 tablet 1     Sig: TAKE 1 TABLET BY MOUTH EVERY DAY         Other Comments: Last Refill   11/19/24

## 2025-05-18 RX ORDER — LISINOPRIL 40 MG/1
40 TABLET ORAL DAILY
Qty: 90 TABLET | Refills: 1 | Status: SHIPPED | OUTPATIENT
Start: 2025-05-18

## 2025-05-21 ENCOUNTER — HOSPITAL ENCOUNTER (INPATIENT)
Facility: HOSPITAL | Age: 75
LOS: 3 days | Discharge: HOME | DRG: 378 | End: 2025-05-24
Attending: STUDENT IN AN ORGANIZED HEALTH CARE EDUCATION/TRAINING PROGRAM | Admitting: FAMILY MEDICINE
Payer: COMMERCIAL

## 2025-05-21 DIAGNOSIS — K92.1 MELENA: Primary | ICD-10-CM

## 2025-05-21 PROBLEM — K92.2 GI BLEED: Status: ACTIVE | Noted: 2025-05-21

## 2025-05-21 LAB
ALBUMIN SERPL-MCNC: 3.7 G/DL (ref 3.5–5)
ALBUMIN/GLOB SERPL: 1.3 (ref 1.1–2.2)
ALP SERPL-CCNC: 37 U/L (ref 45–117)
ALT SERPL-CCNC: 16 U/L (ref 12–78)
ANION GAP SERPL CALC-SCNC: 3 MMOL/L (ref 2–12)
AST SERPL-CCNC: 29 U/L (ref 15–37)
BASOPHILS # BLD: 0.03 K/UL (ref 0–0.1)
BASOPHILS NFR BLD: 0.7 % (ref 0–1)
BILIRUB SERPL-MCNC: 0.5 MG/DL (ref 0.2–1)
BUN SERPL-MCNC: 30 MG/DL (ref 6–20)
BUN/CREAT SERPL: 29 (ref 12–20)
CALCIUM SERPL-MCNC: 8.8 MG/DL (ref 8.5–10.1)
CHLORIDE SERPL-SCNC: 111 MMOL/L (ref 97–108)
CO2 SERPL-SCNC: 28 MMOL/L (ref 21–32)
COMMENT:: NORMAL
CREAT SERPL-MCNC: 1.02 MG/DL (ref 0.7–1.3)
DIFFERENTIAL METHOD BLD: ABNORMAL
EOSINOPHIL # BLD: 0.01 K/UL (ref 0–0.4)
EOSINOPHIL NFR BLD: 0.2 % (ref 0–7)
ERYTHROCYTE [DISTWIDTH] IN BLOOD BY AUTOMATED COUNT: 12.4 % (ref 11.5–14.5)
GLOBULIN SER CALC-MCNC: 2.9 G/DL (ref 2–4)
GLUCOSE BLD STRIP.AUTO-MCNC: 101 MG/DL (ref 65–117)
GLUCOSE SERPL-MCNC: 97 MG/DL (ref 65–100)
HCT VFR BLD AUTO: 35 % (ref 36.6–50.3)
HGB BLD-MCNC: 11.5 G/DL (ref 12.1–17)
IMM GRANULOCYTES # BLD AUTO: 0.01 K/UL (ref 0–0.04)
IMM GRANULOCYTES NFR BLD AUTO: 0.2 % (ref 0–0.5)
LYMPHOCYTES # BLD: 1.08 K/UL (ref 0.8–3.5)
LYMPHOCYTES NFR BLD: 24.8 % (ref 12–49)
MCH RBC QN AUTO: 34 PG (ref 26–34)
MCHC RBC AUTO-ENTMCNC: 32.9 G/DL (ref 30–36.5)
MCV RBC AUTO: 103.6 FL (ref 80–99)
MONOCYTES # BLD: 0.6 K/UL (ref 0–1)
MONOCYTES NFR BLD: 13.8 % (ref 5–13)
NEUTS SEG # BLD: 2.62 K/UL (ref 1.8–8)
NEUTS SEG NFR BLD: 60.3 % (ref 32–75)
NRBC # BLD: 0 K/UL (ref 0–0.01)
NRBC BLD-RTO: 0 PER 100 WBC
PLATELET # BLD AUTO: 173 K/UL (ref 150–400)
PMV BLD AUTO: 10.6 FL (ref 8.9–12.9)
POTASSIUM SERPL-SCNC: 4.4 MMOL/L (ref 3.5–5.1)
PROT SERPL-MCNC: 6.6 G/DL (ref 6.4–8.2)
RBC # BLD AUTO: 3.38 M/UL (ref 4.1–5.7)
SERVICE CMNT-IMP: NORMAL
SODIUM SERPL-SCNC: 142 MMOL/L (ref 136–145)
SPECIMEN HOLD: NORMAL
WBC # BLD AUTO: 4.4 K/UL (ref 4.1–11.1)

## 2025-05-21 PROCEDURE — 6360000002 HC RX W HCPCS

## 2025-05-21 PROCEDURE — 80053 COMPREHEN METABOLIC PANEL: CPT

## 2025-05-21 PROCEDURE — 86900 BLOOD TYPING SEROLOGIC ABO: CPT

## 2025-05-21 PROCEDURE — 86850 RBC ANTIBODY SCREEN: CPT

## 2025-05-21 PROCEDURE — 82962 GLUCOSE BLOOD TEST: CPT

## 2025-05-21 PROCEDURE — 2580000003 HC RX 258: Performed by: FAMILY MEDICINE

## 2025-05-21 PROCEDURE — 86923 COMPATIBILITY TEST ELECTRIC: CPT

## 2025-05-21 PROCEDURE — 84484 ASSAY OF TROPONIN QUANT: CPT

## 2025-05-21 PROCEDURE — 6370000000 HC RX 637 (ALT 250 FOR IP): Performed by: FAMILY MEDICINE

## 2025-05-21 PROCEDURE — 93005 ELECTROCARDIOGRAM TRACING: CPT | Performed by: FAMILY MEDICINE

## 2025-05-21 PROCEDURE — 85025 COMPLETE CBC W/AUTO DIFF WBC: CPT

## 2025-05-21 PROCEDURE — 99285 EMERGENCY DEPT VISIT HI MDM: CPT

## 2025-05-21 PROCEDURE — 85027 COMPLETE CBC AUTOMATED: CPT

## 2025-05-21 PROCEDURE — 2500000003 HC RX 250 WO HCPCS: Performed by: FAMILY MEDICINE

## 2025-05-21 PROCEDURE — 86901 BLOOD TYPING SEROLOGIC RH(D): CPT

## 2025-05-21 PROCEDURE — 6360000002 HC RX W HCPCS: Performed by: FAMILY MEDICINE

## 2025-05-21 PROCEDURE — 1100000000 HC RM PRIVATE

## 2025-05-21 PROCEDURE — 2580000003 HC RX 258

## 2025-05-21 RX ORDER — SODIUM CHLORIDE 0.9 % (FLUSH) 0.9 %
5-40 SYRINGE (ML) INJECTION PRN
Status: DISCONTINUED | OUTPATIENT
Start: 2025-05-21 | End: 2025-05-24 | Stop reason: HOSPADM

## 2025-05-21 RX ORDER — ONDANSETRON 2 MG/ML
4 INJECTION INTRAMUSCULAR; INTRAVENOUS EVERY 6 HOURS PRN
Status: DISCONTINUED | OUTPATIENT
Start: 2025-05-21 | End: 2025-05-24 | Stop reason: HOSPADM

## 2025-05-21 RX ORDER — FLUTICASONE PROPIONATE 50 MCG
2 SPRAY, SUSPENSION (ML) NASAL DAILY PRN
Status: DISCONTINUED | OUTPATIENT
Start: 2025-05-21 | End: 2025-05-24 | Stop reason: HOSPADM

## 2025-05-21 RX ORDER — ATORVASTATIN CALCIUM 10 MG/1
10 TABLET, FILM COATED ORAL DAILY
Status: DISCONTINUED | OUTPATIENT
Start: 2025-05-21 | End: 2025-05-24 | Stop reason: HOSPADM

## 2025-05-21 RX ORDER — HYDRALAZINE HYDROCHLORIDE 20 MG/ML
10 INJECTION INTRAMUSCULAR; INTRAVENOUS EVERY 6 HOURS PRN
Status: DISCONTINUED | OUTPATIENT
Start: 2025-05-21 | End: 2025-05-24 | Stop reason: HOSPADM

## 2025-05-21 RX ORDER — PROCHLORPERAZINE EDISYLATE 5 MG/ML
10 INJECTION INTRAMUSCULAR; INTRAVENOUS EVERY 6 HOURS PRN
Status: DISCONTINUED | OUTPATIENT
Start: 2025-05-21 | End: 2025-05-24 | Stop reason: HOSPADM

## 2025-05-21 RX ORDER — LISINOPRIL 20 MG/1
40 TABLET ORAL DAILY
Status: DISCONTINUED | OUTPATIENT
Start: 2025-05-21 | End: 2025-05-24 | Stop reason: HOSPADM

## 2025-05-21 RX ORDER — SODIUM CHLORIDE, SODIUM LACTATE, POTASSIUM CHLORIDE, CALCIUM CHLORIDE 600; 310; 30; 20 MG/100ML; MG/100ML; MG/100ML; MG/100ML
INJECTION, SOLUTION INTRAVENOUS CONTINUOUS
Status: DISCONTINUED | OUTPATIENT
Start: 2025-05-21 | End: 2025-05-23

## 2025-05-21 RX ORDER — SODIUM CHLORIDE 9 MG/ML
INJECTION, SOLUTION INTRAVENOUS PRN
Status: DISCONTINUED | OUTPATIENT
Start: 2025-05-21 | End: 2025-05-24 | Stop reason: HOSPADM

## 2025-05-21 RX ORDER — MAGNESIUM SULFATE IN WATER 40 MG/ML
2000 INJECTION, SOLUTION INTRAVENOUS PRN
Status: DISCONTINUED | OUTPATIENT
Start: 2025-05-21 | End: 2025-05-24 | Stop reason: HOSPADM

## 2025-05-21 RX ORDER — 0.9 % SODIUM CHLORIDE 0.9 %
1000 INTRAVENOUS SOLUTION INTRAVENOUS ONCE
Status: COMPLETED | OUTPATIENT
Start: 2025-05-21 | End: 2025-05-21

## 2025-05-21 RX ORDER — POTASSIUM CHLORIDE 7.45 MG/ML
10 INJECTION INTRAVENOUS PRN
Status: DISCONTINUED | OUTPATIENT
Start: 2025-05-21 | End: 2025-05-24 | Stop reason: HOSPADM

## 2025-05-21 RX ORDER — SODIUM CHLORIDE 0.9 % (FLUSH) 0.9 %
5-40 SYRINGE (ML) INJECTION EVERY 12 HOURS SCHEDULED
Status: DISCONTINUED | OUTPATIENT
Start: 2025-05-21 | End: 2025-05-24 | Stop reason: HOSPADM

## 2025-05-21 RX ORDER — ACETAMINOPHEN 650 MG/1
650 SUPPOSITORY RECTAL EVERY 6 HOURS PRN
Status: DISCONTINUED | OUTPATIENT
Start: 2025-05-21 | End: 2025-05-24 | Stop reason: HOSPADM

## 2025-05-21 RX ORDER — CHLORTHALIDONE 25 MG/1
25 TABLET ORAL DAILY
Status: DISCONTINUED | OUTPATIENT
Start: 2025-05-21 | End: 2025-05-24 | Stop reason: HOSPADM

## 2025-05-21 RX ORDER — POLYETHYLENE GLYCOL 3350 17 G/17G
17 POWDER, FOR SOLUTION ORAL DAILY PRN
Status: DISCONTINUED | OUTPATIENT
Start: 2025-05-21 | End: 2025-05-24 | Stop reason: HOSPADM

## 2025-05-21 RX ORDER — POTASSIUM CHLORIDE 750 MG/1
40 TABLET, EXTENDED RELEASE ORAL PRN
Status: DISCONTINUED | OUTPATIENT
Start: 2025-05-21 | End: 2025-05-24 | Stop reason: HOSPADM

## 2025-05-21 RX ORDER — ACETAMINOPHEN 325 MG/1
650 TABLET ORAL EVERY 6 HOURS PRN
Status: DISCONTINUED | OUTPATIENT
Start: 2025-05-21 | End: 2025-05-24 | Stop reason: HOSPADM

## 2025-05-21 RX ORDER — METOPROLOL SUCCINATE 25 MG/1
100 TABLET, EXTENDED RELEASE ORAL DAILY
Status: DISCONTINUED | OUTPATIENT
Start: 2025-05-21 | End: 2025-05-24 | Stop reason: HOSPADM

## 2025-05-21 RX ORDER — BUPRENORPHINE HYDROCHLORIDE AND NALOXONE HYDROCHLORIDE DIHYDRATE 8; 2 MG/1; MG/1
1 TABLET SUBLINGUAL 2 TIMES DAILY
Status: DISCONTINUED | OUTPATIENT
Start: 2025-05-21 | End: 2025-05-24 | Stop reason: HOSPADM

## 2025-05-21 RX ORDER — ONDANSETRON 4 MG/1
4 TABLET, ORALLY DISINTEGRATING ORAL EVERY 8 HOURS PRN
Status: DISCONTINUED | OUTPATIENT
Start: 2025-05-21 | End: 2025-05-24 | Stop reason: HOSPADM

## 2025-05-21 RX ADMIN — HYDRALAZINE HYDROCHLORIDE 10 MG: 20 INJECTION INTRAMUSCULAR; INTRAVENOUS at 18:13

## 2025-05-21 RX ADMIN — PANTOPRAZOLE SODIUM 40 MG: 40 INJECTION, POWDER, FOR SOLUTION INTRAVENOUS at 16:05

## 2025-05-21 RX ADMIN — BUPRENORPHINE HYDROCHLORIDE AND NALOXONE HYDROCHLORIDE DIHYDRATE 1 TABLET: 8; 2 TABLET SUBLINGUAL at 16:05

## 2025-05-21 RX ADMIN — SODIUM CHLORIDE, PRESERVATIVE FREE 10 ML: 5 INJECTION INTRAVENOUS at 22:48

## 2025-05-21 RX ADMIN — SODIUM CHLORIDE, SODIUM LACTATE, POTASSIUM CHLORIDE, AND CALCIUM CHLORIDE: .6; .31; .03; .02 INJECTION, SOLUTION INTRAVENOUS at 16:13

## 2025-05-21 RX ADMIN — SODIUM CHLORIDE 1000 ML: 0.9 INJECTION, SOLUTION INTRAVENOUS at 10:37

## 2025-05-21 ASSESSMENT — PAIN SCALES - GENERAL: PAINLEVEL_OUTOF10: 0

## 2025-05-21 NOTE — PROGRESS NOTES
ED TO INPATIENT SBAR HANDOFF    Patient Name: Kashif Montes De Oca Jr.   :  1950  75 y.o.   MRN:  [id number]  ED Room #:  ER07/07     Situation  Code Status: Full Code   Allergies: Patient has no known allergies.  Weight: Patient Vitals for the past 96 hrs (Last 3 readings):   Weight   25 0958 73.3 kg (161 lb 9.6 oz)       Arrived from: home    Chief Complaint:   Chief Complaint   Patient presents with    Rectal Bleeding       Hospital Problem/Diagnosis:  Principal Problem:    GI bleed  Resolved Problems:    * No resolved hospital problems. *      Mobility: no current mobility problem   ED Fall Risk: Presents to emergency department  because of falls (Syncope, seizure, or loss of consciousness): No, Age > 70: Yes, Altered Mental Status, Intoxication with alcohol or substance confusion (Disorientation, impaired judgment, poor safety awaremess, or inability to follow instructions): No, Impaired Mobility: Ambulates or transfers with assistive devices or assistance; Unable to ambulate or transer.: No, Nursing Judgement: No   Fell in ED or prior to admission: no   Restraints: no     Sitter: no   Family/Caregiver Present: no    Neet to know social/safety information: none    Background  History:   Past Medical History:   Diagnosis Date    Arthritis     Hepatitis C     Treated    Hypertension     PUD (peptic ulcer disease)     Seizures (HCC)     as a child-none since     Sleep apnea        Assessment    Abnormal Assessment Findings: rectal bleeding  Imaging:   No orders to display     Abnormal labs:   Abnormal Labs Reviewed   COMPREHENSIVE METABOLIC PANEL - Abnormal; Notable for the following components:       Result Value    Chloride 111 (*)     BUN 30 (*)     BUN/Creatinine Ratio 29 (*)     Alk Phosphatase 37 (*)     All other components within normal limits   CBC WITH AUTO DIFFERENTIAL - Abnormal; Notable for the following components:    RBC 3.38 (*)     Hemoglobin 11.5 (*)     Hematocrit 35.0 (*)     MCV

## 2025-05-21 NOTE — CONSULTS
[facility]    [address] [address]        GASTROENTEROLOGY CONSULTATION NOTE  Yola Logan PA-C  [phone] office  NP/PA in-hospital M-F until 4:30PM  After 5PM or on weekends, please call  for physician on call        NAME:  Kashif Montes De Oca Jr.   :   1950   MRN:   [id number]       Referring Physician: Dr. Neely    Consult Date: 2025 3:03 PM    Chief Complaint: GI bleed     History of Present Illness:  Patient is a 75 y.o. who is seen in consultation at the request of Dr. Neely for GI bleed. Past medical history significant for hemicolectomy in 2017, chronic colitis, and gastric ulcer. Patient presented to the emergency room with complaints of melena. Patient reports 3 episodes of melena starting yesterday. He reports feeling lightheaded when getting up from chair this morning. He denies any hematemesis, nausea, vomiting, abdominal pain, or acid reflux. No dysphagia or odynophagia. Reports drinking 2 shots of alcohol today. Drinks 3-5 shots of alcohol daily. No regular NSAID use. No tobacco use.     EGD 3/27/2017 with  for hematemesis and melena showed pyloric gastric ulcer with adherent clot. Colonoscopy 2023 showed previous surgery in the colon. Normal mucosa throughout the whole colon. Grade I internal hemorrhoids. Recall 5 years.     I have reviewed the emergency room note, hospital admission note, notes by all other clinicians who have seen the patient during this hospitalization to date. I have reviewed the problem list and the reason for this hospitalization. I have reviewed the allergies and the medications the patient was taking at home prior to this hospitalization.    PMH:  Past Medical History:   Diagnosis Date    Arthritis     Hepatitis C     Treated    Hypertension     PUD (peptic ulcer disease)     Seizures (HCC)     as a child-none since     Sleep apnea        PSH:  Past Surgical History:   Procedure Laterality Date

## 2025-05-21 NOTE — DISCHARGE INSTRUCTIONS
Blood pressure medications adjusted. They can be resumed by your PCP if appropriate.    Follow-up with the GI doctor for repeat endoscopy in 2 months.

## 2025-05-21 NOTE — ED PROVIDER NOTES
[facility] EMERGENCY DEPARTMENT  EMERGENCY DEPARTMENT ENCOUNTER      Pt Name: Kashif Montes De Oca Jr.  MRN: [id number]  Birthdate 1950  Date of evaluation: 5/21/2025  Provider: Gracy Burks PA-C    CHIEF COMPLAINT       Chief Complaint   Patient presents with    Rectal Bleeding         HISTORY OF PRESENT ILLNESS   (Location/Symptom, Timing/Onset, Context/Setting, Quality, Duration, Modifying Factors, Severity)  Note limiting factors.   Patient is a 75-year-old male with a past medical history of hemicolectomy in 2017, chronic colitis, gastric ulcer, who presents today for evaluation of dark-colored stool.  Patient yesterday had 2 bowel movements that were very dark in color and 1 this morning.  No bright red blood noticed per patient.  No rectal pain.  Bowel movements are normal in consistency and frequency.  Notes this happened once in the past in 2017.    Additionally patient endorses some general fatigue, lightheadedness, and daily alcohol intake of 4-5 drinks.  Patient denies taking any Pepto-Bismol.  Denies any abdominal pain, dysuria, hematuria, hematemesis, nausea, diarrhea, constipation, changes in diet.                    Review of External Medical Records:     Nursing Notes were reviewed.    REVIEW OF SYSTEMS    (2-9 systems for level 4, 10 or more for level 5)     Review of Systems    Except as noted above the remainder of the review of systems was reviewed and negative.       PAST MEDICAL HISTORY     Past Medical History:   Diagnosis Date    Arthritis     Hepatitis C     Treated    Hypertension     PUD (peptic ulcer disease)     Seizures (HCC)     as a child-none since     Sleep apnea          SURGICAL HISTORY       Past Surgical History:   Procedure Laterality Date    APPENDECTOMY      FOOT SURGERY Left     GI      surgical repair of bleeding ulcer    GI      colon resection    HEENT      T&A    KNEE ARTHROSCOPY  1996    right knee    ORTHOPEDIC SURGERY  2003 & 2005    left knee

## 2025-05-21 NOTE — H&P
History and Physical    Date of Service:  5/21/2025  Primary Care Provider: Melchor Briscoe MD  Source of information: The patient and Chart review    Chief Complaint: Rectal Bleeding      History of Presenting Illness:   Kashif Montes De Oca Jr. is a 75 y.o. male who presents with chief complaint of passing black stools.  Patient reports 3 black stools since yesterday along with some generalized weakness, report somewhat lightheaded.  Patient has history of gastric ulcer.  Reports prior history of GI bleed back in 2017.  Patient presented with stable vital signs, noted somewhat hypertensive.  Labs remarkable for mild elevated BUN of 30.  And hemoglobin is borderline low at 11.5.  Otherwise patient remains hemodynamically stable in the ER.  Patient received 40 mg of IV Protonix and hospitalist service requested admit the patient for further evaluation management.    The patient denies any headache, blurry vision, sore throat, trouble swallowing, trouble with speech, chest pain, SOB, cough, fever, chills, N/V/D, abd pain, urinary symptoms, constipation, recent travels, sick contacts, focal or generalized neurological symptoms, falls, injuries, rashes, contact with COVID-19 diagnosed patients, hematemesis, melena, hemoptysis, hematuria, rashes, denies starting any new medications and denies any other concerns or problems besides as mentioned above.         REVIEW OF SYSTEMS:  A comprehensive review of systems was negative except for that written in the History of Present Illness.     Past Medical History:   Diagnosis Date    Arthritis     Hepatitis C     Treated    Hypertension     PUD (peptic ulcer disease)     Seizures (HCC)     as a child-none since     Sleep apnea       Past Surgical History:   Procedure Laterality Date    APPENDECTOMY      FOOT SURGERY Left     GI      surgical repair of bleeding ulcer    GI      colon resection    HEENT      T&A    KNEE ARTHROSCOPY  1996    right knee    ORTHOPEDIC  SURGERY  2003 & 2005    left knee     Prior to Admission medications    Medication Sig Start Date End Date Taking? Authorizing Provider   lisinopril (PRINIVIL;ZESTRIL) 40 MG tablet TAKE 1 TABLET BY MOUTH EVERY DAY 5/18/25   Melchor Briscoe MD   chlorthalidone (HYGROTON) 25 MG tablet TAKE 1 TABLET BY MOUTH EVERY DAY 4/28/25   Melchor Briscoe MD   atorvastatin (LIPITOR) 10 MG tablet TAKE 1 TABLET BY MOUTH EVERY DAY 4/28/25   Melchor Briscoe MD   fluticasone (FLONASE) 50 MCG/ACT nasal spray SPRAY 2 SPRAYS BY NASAL ROUTE DAILY. 3/20/25   Melchor Briscoe MD   metoprolol succinate (TOPROL XL) 100 MG extended release tablet TAKE 1 TABLET BY MOUTH EVERY DAY 11/18/24   Melchor Briscoe MD   Multiple Vitamin (MULTIVITAMIN ADULT PO) Take 1 tablet by mouth daily    Provider, MD Rafaela   aspirin 81 MG chewable tablet Take by mouth daily 11/24/22   Automatic Reconciliation, Ar   buprenorphine-naloxone (SUBOXONE) 8-2 MG SUBL SL tablet Place 1 tablet under the tongue in the morning and at bedtime. Pt states that he takes one tab in the AM and one tab in the evenings    Automatic Reconciliation, Ar     No Known Allergies   Family History   Problem Relation Age of Onset    No Known Problems Mother     Diabetes Father     Heart Disease Father     Diabetes Sister     Diabetes Sister     Hypertension Brother     Diabetes Paternal Grandmother     Asthma Son     Cancer Paternal Aunt       Social History:  reports that he quit smoking about 14 years ago. His smoking use included cigarettes. He started smoking about 56 years ago. He has a 10.5 pack-year smoking history. He has never used smokeless tobacco. He reports that he does not currently use alcohol. He reports that he does not use drugs.   Social Drivers of Health     Tobacco Use: Medium Risk (11/8/2024)    Patient History     Smoking Tobacco Use: Former     Smokeless Tobacco Use: Never     Passive Exposure: Not on file   Alcohol Use: Not At Risk (4/11/2024)

## 2025-05-21 NOTE — ED TRIAGE NOTES
Pt arrived ambulatory to the ER with CC soft black stools x3 since yesterday. +fatigue, mild lightheadedness.   ETOH daily (4-5 drinks/day)    Denies pepto bismol, abd pain,  s/sx, hematuria, fever/chills, N/V/D/C, changes in diet.

## 2025-05-22 ENCOUNTER — ANESTHESIA (OUTPATIENT)
Facility: HOSPITAL | Age: 75
DRG: 378 | End: 2025-05-22
Payer: COMMERCIAL

## 2025-05-22 ENCOUNTER — APPOINTMENT (OUTPATIENT)
Facility: HOSPITAL | Age: 75
DRG: 378 | End: 2025-05-22
Payer: COMMERCIAL

## 2025-05-22 ENCOUNTER — ANESTHESIA EVENT (OUTPATIENT)
Facility: HOSPITAL | Age: 75
DRG: 378 | End: 2025-05-22
Payer: COMMERCIAL

## 2025-05-22 LAB
ALBUMIN SERPL-MCNC: 3.3 G/DL (ref 3.5–5)
ALBUMIN/GLOB SERPL: 1.3 (ref 1.1–2.2)
ALP SERPL-CCNC: 33 U/L (ref 45–117)
ALT SERPL-CCNC: 19 U/L (ref 12–78)
ANION GAP SERPL CALC-SCNC: 4 MMOL/L (ref 2–12)
ANION GAP SERPL CALC-SCNC: 6 MMOL/L (ref 2–12)
AST SERPL-CCNC: 27 U/L (ref 15–37)
BASOPHILS # BLD: 0.02 K/UL (ref 0–0.1)
BASOPHILS NFR BLD: 0.3 % (ref 0–1)
BILIRUB SERPL-MCNC: 1 MG/DL (ref 0.2–1)
BUN SERPL-MCNC: 28 MG/DL (ref 6–20)
BUN SERPL-MCNC: 29 MG/DL (ref 6–20)
BUN/CREAT SERPL: 28 (ref 12–20)
BUN/CREAT SERPL: 37 (ref 12–20)
CALCIUM SERPL-MCNC: 8.9 MG/DL (ref 8.5–10.1)
CALCIUM SERPL-MCNC: 9 MG/DL (ref 8.5–10.1)
CHLORIDE SERPL-SCNC: 108 MMOL/L (ref 97–108)
CHLORIDE SERPL-SCNC: 108 MMOL/L (ref 97–108)
CO2 SERPL-SCNC: 25 MMOL/L (ref 21–32)
CO2 SERPL-SCNC: 28 MMOL/L (ref 21–32)
COMMENT:: NORMAL
CREAT SERPL-MCNC: 0.76 MG/DL (ref 0.7–1.3)
CREAT SERPL-MCNC: 1.03 MG/DL (ref 0.7–1.3)
DIFFERENTIAL METHOD BLD: ABNORMAL
EKG ATRIAL RATE: 109 BPM
EKG DIAGNOSIS: NORMAL
EKG P AXIS: 17 DEGREES
EKG P-R INTERVAL: 190 MS
EKG Q-T INTERVAL: 350 MS
EKG QRS DURATION: 82 MS
EKG QTC CALCULATION (BAZETT): 471 MS
EKG R AXIS: -9 DEGREES
EKG T AXIS: 43 DEGREES
EKG VENTRICULAR RATE: 109 BPM
EOSINOPHIL # BLD: 0 K/UL (ref 0–0.4)
EOSINOPHIL NFR BLD: 0 % (ref 0–7)
ERYTHROCYTE [DISTWIDTH] IN BLOOD BY AUTOMATED COUNT: 12.3 % (ref 11.5–14.5)
ERYTHROCYTE [DISTWIDTH] IN BLOOD BY AUTOMATED COUNT: 12.4 % (ref 11.5–14.5)
GLOBULIN SER CALC-MCNC: 2.5 G/DL (ref 2–4)
GLUCOSE SERPL-MCNC: 109 MG/DL (ref 65–100)
GLUCOSE SERPL-MCNC: 174 MG/DL (ref 65–100)
HCT VFR BLD AUTO: 27.1 % (ref 36.6–50.3)
HCT VFR BLD AUTO: 28.9 % (ref 36.6–50.3)
HGB BLD-MCNC: 8.8 G/DL (ref 12.1–17)
HGB BLD-MCNC: 9.5 G/DL (ref 12.1–17)
IMM GRANULOCYTES # BLD AUTO: 0.01 K/UL (ref 0–0.04)
IMM GRANULOCYTES NFR BLD AUTO: 0.2 % (ref 0–0.5)
LYMPHOCYTES # BLD: 0.97 K/UL (ref 0.8–3.5)
LYMPHOCYTES NFR BLD: 15.1 % (ref 12–49)
MCH RBC QN AUTO: 33.5 PG (ref 26–34)
MCH RBC QN AUTO: 33.7 PG (ref 26–34)
MCHC RBC AUTO-ENTMCNC: 32.5 G/DL (ref 30–36.5)
MCHC RBC AUTO-ENTMCNC: 32.9 G/DL (ref 30–36.5)
MCV RBC AUTO: 101.8 FL (ref 80–99)
MCV RBC AUTO: 103.8 FL (ref 80–99)
MONOCYTES # BLD: 0.5 K/UL (ref 0–1)
MONOCYTES NFR BLD: 7.8 % (ref 5–13)
NEUTS SEG # BLD: 4.94 K/UL (ref 1.8–8)
NEUTS SEG NFR BLD: 76.6 % (ref 32–75)
NRBC # BLD: 0 K/UL (ref 0–0.01)
NRBC # BLD: 0 K/UL (ref 0–0.01)
NRBC BLD-RTO: 0 PER 100 WBC
NRBC BLD-RTO: 0 PER 100 WBC
PLATELET # BLD AUTO: 140 K/UL (ref 150–400)
PLATELET # BLD AUTO: 164 K/UL (ref 150–400)
PMV BLD AUTO: 10.5 FL (ref 8.9–12.9)
PMV BLD AUTO: 10.8 FL (ref 8.9–12.9)
POTASSIUM SERPL-SCNC: 3.9 MMOL/L (ref 3.5–5.1)
POTASSIUM SERPL-SCNC: 4.9 MMOL/L (ref 3.5–5.1)
PROT SERPL-MCNC: 5.8 G/DL (ref 6.4–8.2)
RBC # BLD AUTO: 2.61 M/UL (ref 4.1–5.7)
RBC # BLD AUTO: 2.84 M/UL (ref 4.1–5.7)
SODIUM SERPL-SCNC: 139 MMOL/L (ref 136–145)
SODIUM SERPL-SCNC: 140 MMOL/L (ref 136–145)
SPECIMEN HOLD: NORMAL
TROPONIN I SERPL HS-MCNC: 85 NG/L (ref 0–76)
WBC # BLD AUTO: 5.1 K/UL (ref 4.1–11.1)
WBC # BLD AUTO: 6.4 K/UL (ref 4.1–11.1)

## 2025-05-22 PROCEDURE — 6360000002 HC RX W HCPCS: Performed by: NURSE ANESTHETIST, CERTIFIED REGISTERED

## 2025-05-22 PROCEDURE — 7100000010 HC PHASE II RECOVERY - FIRST 15 MIN: Performed by: INTERNAL MEDICINE

## 2025-05-22 PROCEDURE — 6370000000 HC RX 637 (ALT 250 FOR IP): Performed by: NURSE PRACTITIONER

## 2025-05-22 PROCEDURE — 88342 IMHCHEM/IMCYTCHM 1ST ANTB: CPT

## 2025-05-22 PROCEDURE — 3700000000 HC ANESTHESIA ATTENDED CARE: Performed by: INTERNAL MEDICINE

## 2025-05-22 PROCEDURE — 1100000000 HC RM PRIVATE

## 2025-05-22 PROCEDURE — 2500000003 HC RX 250 WO HCPCS: Performed by: FAMILY MEDICINE

## 2025-05-22 PROCEDURE — 2580000003 HC RX 258: Performed by: FAMILY MEDICINE

## 2025-05-22 PROCEDURE — 6360000002 HC RX W HCPCS: Performed by: FAMILY MEDICINE

## 2025-05-22 PROCEDURE — 2709999900 HC NON-CHARGEABLE SUPPLY: Performed by: INTERNAL MEDICINE

## 2025-05-22 PROCEDURE — 80053 COMPREHEN METABOLIC PANEL: CPT

## 2025-05-22 PROCEDURE — 6370000000 HC RX 637 (ALT 250 FOR IP): Performed by: FAMILY MEDICINE

## 2025-05-22 PROCEDURE — 71045 X-RAY EXAM CHEST 1 VIEW: CPT

## 2025-05-22 PROCEDURE — 88305 TISSUE EXAM BY PATHOLOGIST: CPT

## 2025-05-22 PROCEDURE — 85025 COMPLETE CBC W/AUTO DIFF WBC: CPT

## 2025-05-22 PROCEDURE — 3600007502: Performed by: INTERNAL MEDICINE

## 2025-05-22 PROCEDURE — 7100000011 HC PHASE II RECOVERY - ADDTL 15 MIN: Performed by: INTERNAL MEDICINE

## 2025-05-22 PROCEDURE — 2580000003 HC RX 258: Performed by: NURSE ANESTHETIST, CERTIFIED REGISTERED

## 2025-05-22 PROCEDURE — 0DB68ZX EXCISION OF STOMACH, VIA NATURAL OR ARTIFICIAL OPENING ENDOSCOPIC, DIAGNOSTIC: ICD-10-PCS | Performed by: INTERNAL MEDICINE

## 2025-05-22 PROCEDURE — 2720000010 HC SURG SUPPLY STERILE: Performed by: INTERNAL MEDICINE

## 2025-05-22 RX ORDER — SODIUM CHLORIDE 9 MG/ML
INJECTION, SOLUTION INTRAVENOUS CONTINUOUS
Status: DISCONTINUED | OUTPATIENT
Start: 2025-05-22 | End: 2025-05-22 | Stop reason: HOSPADM

## 2025-05-22 RX ORDER — SODIUM CHLORIDE 9 MG/ML
INJECTION, SOLUTION INTRAVENOUS PRN
Status: DISCONTINUED | OUTPATIENT
Start: 2025-05-22 | End: 2025-05-22 | Stop reason: HOSPADM

## 2025-05-22 RX ORDER — SODIUM CHLORIDE 0.9 % (FLUSH) 0.9 %
5-40 SYRINGE (ML) INJECTION PRN
Status: DISCONTINUED | OUTPATIENT
Start: 2025-05-22 | End: 2025-05-22 | Stop reason: HOSPADM

## 2025-05-22 RX ORDER — SODIUM CHLORIDE, SODIUM LACTATE, POTASSIUM CHLORIDE, AND CALCIUM CHLORIDE .6; .31; .03; .02 G/100ML; G/100ML; G/100ML; G/100ML
500 INJECTION, SOLUTION INTRAVENOUS ONCE
Status: DISCONTINUED | OUTPATIENT
Start: 2025-05-22 | End: 2025-05-24 | Stop reason: HOSPADM

## 2025-05-22 RX ORDER — SODIUM CHLORIDE 9 MG/ML
INJECTION, SOLUTION INTRAVENOUS
Status: DISCONTINUED | OUTPATIENT
Start: 2025-05-22 | End: 2025-05-22 | Stop reason: SDUPTHER

## 2025-05-22 RX ORDER — SODIUM CHLORIDE 0.9 % (FLUSH) 0.9 %
5-40 SYRINGE (ML) INJECTION EVERY 12 HOURS SCHEDULED
Status: DISCONTINUED | OUTPATIENT
Start: 2025-05-22 | End: 2025-05-22 | Stop reason: HOSPADM

## 2025-05-22 RX ADMIN — SODIUM CHLORIDE, SODIUM LACTATE, POTASSIUM CHLORIDE, AND CALCIUM CHLORIDE 500 ML: .6; .31; .03; .02 INJECTION, SOLUTION INTRAVENOUS at 00:12

## 2025-05-22 RX ADMIN — SODIUM CHLORIDE, SODIUM LACTATE, POTASSIUM CHLORIDE, AND CALCIUM CHLORIDE: .6; .31; .03; .02 INJECTION, SOLUTION INTRAVENOUS at 01:16

## 2025-05-22 RX ADMIN — SODIUM CHLORIDE, PRESERVATIVE FREE 10 ML: 5 INJECTION INTRAVENOUS at 20:16

## 2025-05-22 RX ADMIN — BUPRENORPHINE HYDROCHLORIDE AND NALOXONE HYDROCHLORIDE DIHYDRATE 1 TABLET: 8; 2 TABLET SUBLINGUAL at 08:26

## 2025-05-22 RX ADMIN — BUPRENORPHINE HYDROCHLORIDE AND NALOXONE HYDROCHLORIDE DIHYDRATE 1 TABLET: 8; 2 TABLET SUBLINGUAL at 20:15

## 2025-05-22 RX ADMIN — LIDOCAINE HYDROCHLORIDE 40 ML: 20 SOLUTION ORAL at 00:29

## 2025-05-22 RX ADMIN — SODIUM CHLORIDE: 9 INJECTION, SOLUTION INTRAVENOUS at 11:28

## 2025-05-22 RX ADMIN — PROPOFOL 50 MG: 10 INJECTION, EMULSION INTRAVENOUS at 11:34

## 2025-05-22 RX ADMIN — SODIUM CHLORIDE, PRESERVATIVE FREE 10 ML: 5 INJECTION INTRAVENOUS at 08:30

## 2025-05-22 RX ADMIN — LISINOPRIL 40 MG: 20 TABLET ORAL at 08:26

## 2025-05-22 RX ADMIN — PROPOFOL 50 MG: 10 INJECTION, EMULSION INTRAVENOUS at 11:40

## 2025-05-22 RX ADMIN — PROPOFOL 50 MG: 10 INJECTION, EMULSION INTRAVENOUS at 11:36

## 2025-05-22 RX ADMIN — SODIUM CHLORIDE, SODIUM LACTATE, POTASSIUM CHLORIDE, AND CALCIUM CHLORIDE: .6; .31; .03; .02 INJECTION, SOLUTION INTRAVENOUS at 20:17

## 2025-05-22 RX ADMIN — PROPOFOL 50 MG: 10 INJECTION, EMULSION INTRAVENOUS at 11:38

## 2025-05-22 RX ADMIN — SODIUM CHLORIDE 40 MG: 9 INJECTION INTRAMUSCULAR; INTRAVENOUS; SUBCUTANEOUS at 15:19

## 2025-05-22 RX ADMIN — PROPOFOL 50 MG: 10 INJECTION, EMULSION INTRAVENOUS at 11:42

## 2025-05-22 RX ADMIN — METOPROLOL SUCCINATE 100 MG: 25 TABLET, FILM COATED, EXTENDED RELEASE ORAL at 08:27

## 2025-05-22 RX ADMIN — SODIUM CHLORIDE 40 MG: 9 INJECTION INTRAMUSCULAR; INTRAVENOUS; SUBCUTANEOUS at 03:22

## 2025-05-22 RX ADMIN — PROPOFOL 100 MG: 10 INJECTION, EMULSION INTRAVENOUS at 11:33

## 2025-05-22 RX ADMIN — ATORVASTATIN CALCIUM 10 MG: 20 TABLET, FILM COATED ORAL at 08:26

## 2025-05-22 ASSESSMENT — PAIN SCALES - GENERAL
PAINLEVEL_OUTOF10: 0

## 2025-05-22 NOTE — ANESTHESIA PRE PROCEDURE
Department of Anesthesiology  Preprocedure Note       Name:  Kashif Montes De Oca Jr.   Age:  75 y.o.  :  1950                                          MRN:  [id number]         Date:  2025      Surgeon: Surgeon(s):  Sancho Palma MD    Procedure: Procedure(s):  ESOPHAGOGASTRODUODENOSCOPY    Medications prior to admission:   Prior to Admission medications    Medication Sig Start Date End Date Taking? Authorizing Provider   lisinopril (PRINIVIL;ZESTRIL) 40 MG tablet TAKE 1 TABLET BY MOUTH EVERY DAY 25   Melchor Briscoe MD   chlorthalidone (HYGROTON) 25 MG tablet TAKE 1 TABLET BY MOUTH EVERY DAY 25   Melchor Briscoe MD   atorvastatin (LIPITOR) 10 MG tablet TAKE 1 TABLET BY MOUTH EVERY DAY 25   Melchor Briscoe MD   fluticasone (FLONASE) 50 MCG/ACT nasal spray SPRAY 2 SPRAYS BY NASAL ROUTE DAILY. 3/20/25   Melchor Briscoe MD   metoprolol succinate (TOPROL XL) 100 MG extended release tablet TAKE 1 TABLET BY MOUTH EVERY DAY 24   Melchor Briscoe MD   Multiple Vitamin (MULTIVITAMIN ADULT PO) Take 1 tablet by mouth daily    Provider, MD Rafaela   aspirin 81 MG chewable tablet Take by mouth daily 22   Automatic Reconciliation, Ar   buprenorphine-naloxone (SUBOXONE) 8-2 MG SUBL SL tablet Place 1 tablet under the tongue in the morning and at bedtime. Pt states that he takes one tab in the AM and one tab in the evenings    Automatic Reconciliation, Ar       Current medications:    Current Facility-Administered Medications   Medication Dose Route Frequency Provider Last Rate Last Admin   • 0.9 % sodium chloride infusion   IntraVENous Continuous Sancho Palma MD       • sodium chloride flush 0.9 % injection 5-40 mL  5-40 mL IntraVENous 2 times per day Sancho Palma MD       • sodium chloride flush 0.9 % injection 5-40 mL  5-40 mL IntraVENous PRN Sancho Palma MD       • 0.9 % sodium chloride infusion   IntraVENous PRN Sancho Palma MD       • lactated

## 2025-05-22 NOTE — OP NOTE
DEMARCUS Palma M.D.  [phone]               Esophagogastroduodenoscopy (EGD) Procedure Note    NAME: Kashif Montes De Oca Jr.  :  1950  MRN:  [id number]    Indications: Melena    : Sancho Palma MD    Referring Provider:  Melchor Briscoe MD    Staff: Circulator: Janine Silva, RN  Endoscopy Technician: Brandee Haywood    Prosthetic devices, grafts, tissues, transplant, or devices implanted: none    Medicine:  MAC anesthesia      Procedure Details:  After informed consent was obtained with all risks and benefits of the procedure explained and preprocedure exam completed, the patient was placed in the left lateral decubitus position.  Universal protocol for patient identification was performed and documented in the nursing notes.  Throughout the procedure, the patient's blood pressure was monitored at least every five minutes; pulse, and oxygen saturations were monitored continuously.  All vital signs were documented in the nursing notes.  The endoscope was inserted into the mouth and advanced under direct vision to second portion of the duodenum.  A careful inspection was made as the gastroscope was withdrawn, including a retroflexed view of the proximal stomach; findings and interventions are described below.      Findings:   Esophagus: normal  Stomach: 3 clean-based ulcers in the antrum with greatest diameter of 10 mm status post biopsies, rest of the stomach was normal status post biopsies throughout  Duodenum: normal    Interventions:    biopsy of stomach  whole    Specimens:   ID Type Source Tests Collected by Time Destination   1 : Gastric Ulcers Bx Tissue Gastric SURGICAL PATHOLOGY Sancho Palma MD 2025 1140    2 : Gastric Bx Tissue Gastric SURGICAL PATHOLOGY Sancho Palma MD 2025 1142         EBL: None          Complications:     No immediate complications        Impression:  -See above.  No blood throughout the exam.    Recommendations:  -Await

## 2025-05-22 NOTE — PROGRESS NOTES
Comprehensive Nutrition Assessment    Type and Reason for Visit: Initial, Positive nutrition screen    Nutrition Recommendations/Plan:   Continue regular, GI bland diet   Add Ensure Plus HP BID  Please document % intake of meals in flowsheets   Continue to trend weights, standing scale preferred        Malnutrition Assessment:  Malnutrition Status:  At risk for malnutrition (05/22/25 1419)    Context:  Acute Illness     Findings of the 6 clinical characteristics of malnutrition:  Energy Intake:  Mild decrease in energy intake  Weight Loss:  Mild weight loss (11% x 1 year)     Body Fat Loss:  No body fat loss     Muscle Mass Loss:  Mild muscle mass loss Temples (temporalis), Clavicles (pectoralis & deltoids)  Fluid Accumulation:  No fluid accumulation     Strength:  Not Performed       Nutrition Assessment:    PMH of arthritis, Hepatitis C, PUD, Seizures, HTN, appendectomy, hemicolectomy (2017), alcohol use, opiate dependence, NKA.     Presents with CC of passing black stools, admitted for GIB. H/o gastric ulcer and GI bleed in 2017. S/p rapid response 5/21 for chest pain. S/p EGD 5/22, noted 3 ulcers s/p bx, pending path.     Chart reviewed for MST 2. RD visited pt at bedside. Pt NPO during time of RD visit, pt reports a normal appetite/intake PTA, baseline of 1 to 1.5 meal/day (pt has been eating this way over the past 7 -10 years per pt report). Pt endorses ~20# (11%) unintentional wt loss over the past year, not nutritionally significant given time-frame. Pt amenable to try ONS, will add to diet order now that diet has been advanced. Mild wasting noted via NFPE, pt states that his clothes have been fitting looser since his wt loss. Continue current diet.       Nutritionally Significant Medications:  Lipitor, Suboxone, Lactated ringers @75ml/hr, Protonix      Estimated Daily Nutrient Needs:  Energy Requirements Based On: Kcal/kg  Weight Used for Energy Requirements: Current  Energy (kcal/day): 1833 - 2199

## 2025-05-22 NOTE — H&P
[address] [address]          Pre-procedure History and Physical       NAME:  Kashif Montes De Oca Jr.   :   1950   MRN:   [id number]     CHIEF COMPLAINT/HPI: Melena    PMH:  Past Medical History:   Diagnosis Date    Arthritis     Hepatitis C     Treated    Hypertension     PUD (peptic ulcer disease)     Seizures (HCC)     as a child-none since     Sleep apnea        PSH:  Past Surgical History:   Procedure Laterality Date    APPENDECTOMY      FOOT SURGERY Left     GI      surgical repair of bleeding ulcer    GI      colon resection    HEENT      T&A    KNEE ARTHROSCOPY      right knee    ORTHOPEDIC SURGERY   &     left knee       Allergies:  No Known Allergies    Home Medications:  Prior to Admission Medications   Prescriptions Last Dose Informant Patient Reported? Taking?   Multiple Vitamin (MULTIVITAMIN ADULT PO)   Yes No   Sig: Take 1 tablet by mouth daily   aspirin 81 MG chewable tablet   Yes No   Sig: Take by mouth daily   atorvastatin (LIPITOR) 10 MG tablet   No No   Sig: TAKE 1 TABLET BY MOUTH EVERY DAY   buprenorphine-naloxone (SUBOXONE) 8-2 MG SUBL SL tablet   Yes No   Sig: Place 1 tablet under the tongue in the morning and at bedtime. Pt states that he takes one tab in the AM and one tab in the evenings   chlorthalidone (HYGROTON) 25 MG tablet   No No   Sig: TAKE 1 TABLET BY MOUTH EVERY DAY   fluticasone (FLONASE) 50 MCG/ACT nasal spray   No No   Sig: SPRAY 2 SPRAYS BY NASAL ROUTE DAILY.   lisinopril (PRINIVIL;ZESTRIL) 40 MG tablet   No No   Sig: TAKE 1 TABLET BY MOUTH EVERY DAY   metoprolol succinate (TOPROL XL) 100 MG extended release tablet   No No   Sig: TAKE 1 TABLET BY MOUTH EVERY DAY      Facility-Administered Medications: None       Hospital Medications:  Current Facility-Administered Medications   Medication Dose Route Frequency    0.9 % sodium chloride infusion   IntraVENous Continuous    sodium chloride flush 0.9 % injection 5-40 mL  5-40 mL

## 2025-05-22 NOTE — PROGRESS NOTES
Occupational Health - Occupational Stress Questionnaire     Feeling of Stress : Not at all   Social Connections: Moderately Integrated (12/29/2022)    Social Connection and Isolation Panel [NHANES]     Frequency of Communication with Friends and Family: More than three times a week     Frequency of Social Gatherings with Friends and Family: More than three times a week     Attends Taoism Services: More than 4 times per year     Active Member of Clubs or Organizations: No     Attends Club or Organization Meetings: Never     Marital Status:    Intimate Partner Violence: Not At Risk (12/29/2022)    Humiliation, Afraid, Rape, and Kick questionnaire     Fear of Current or Ex-Partner: No     Emotionally Abused: No     Physically Abused: No     Sexually Abused: No   Depression: Not at risk (2/19/2025)    PHQ-2     PHQ-2 Score: 0   Housing Stability: Low Risk  (2/19/2025)    Housing Stability Vital Sign     Unable to Pay for Housing in the Last Year: No     Number of Times Moved in the Last Year: 0     Homeless in the Last Year: No   Interpersonal Safety: Not At Risk (5/21/2025)    Interpersonal Safety Domain Source: IP Abuse Screening     Physical abuse: Denies     Verbal abuse: Denies     Emotional abuse: Denies     Financial abuse: Denies     Sexual abuse: Denies   Utilities: Not At Risk (2/19/2025)    [facility] Utilities     Threatened with loss of utilities: No       Review of Systems:   A comprehensive review of systems was negative.       Vital Signs:    Last 24hrs VS reviewed since prior progress note. Most recent are:  Vitals:    05/22/25 1230   BP: 125/84   Pulse: 83   Resp: 14   Temp: 98.1 °F (36.7 °C)   SpO2: 97%         Intake/Output Summary (Last 24 hours) at 5/22/2025 1329  Last data filed at 5/22/2025 1215  Gross per 24 hour   Intake 2900 ml   Output 275 ml   Net 2625 ml        Physical Examination:     I had a face to face encounter with this patient and independently examined them on 5/22/2025 as  acetaminophen (TYLENOL) suppository 650 mg  650 mg Rectal Q6H PRN    hydrALAZINE (APRESOLINE) injection 10 mg  10 mg IntraVENous Q6H PRN    prochlorperazine (COMPAZINE) injection 10 mg  10 mg IntraVENous Q6H PRN     ______________________________________________________________________  EXPECTED LENGTH OF STAY: 2  ACTUAL LENGTH OF STAY:          1                 Crystal Gomez MD

## 2025-05-22 NOTE — PROGRESS NOTES
Hospitalist Night Cover     Name: Kashif Montes De Oca Jr.  YOB: 1950      Overnight update:        RRT called for mid chest discomfort and acute vomiting after having BM which appears dark red.  Patient shortly became hypotensive.   VS: BP 72/55, HR 92, RR 18, O2 sat 96% on RA. ,     - ECG: no acute ischemic cahnges noted  - Troponin, CBC, CMP, Type and screen  - CXR: No acute process on portable chest   - GI cocktail PO x 1 dose  - Compazine PRN  -  ml IV bolus x 1  - patient consented for blood transfusion    1245: Chest discomfort resolved and BP improved  VS: /80, HR 90, RR 18, O2 sat 99%  H/H 9.5/28.9  Troponin: 85    - repeat Troponin with AM labs     Critical Care Attestation:  This patient is unstable and critically ill. Due to a high probability of clinically significant, life threatening deterioration, the patient required my highest level of preparedness to intervene emergently and I personally spent this critical care time directly and personally managing the patient. This critical care time included obtaining a history; examining the patient; pulse oximetry; ordering and review of studies; arranging urgent treatment with development of a management plan; evaluation of patient's response to treatment; frequent reassessment; and, discussions with other providers and/or family. This critical care time was performed to assess and manage the high probability of imminent, life-threatening deterioration that could result in multi-organ failure and death. It was exclusive of separately billable procedures, treating other patients, and teaching time.      Time Spent:     I personally spent 40 minutes in providing critical care time.    SHIVANI Borden

## 2025-05-22 NOTE — PROGRESS NOTES
TRANSFER - IN REPORT:    Verbal report received from Lanie HAQUE on Kashif Jiangin .  being received from [facility] for ordered procedure      Report consisted of patient's Situation, Background, Assessment and   Recommendations(SBAR).     Information from the following report(s) Nurse Handoff Report, Intake/Output, MAR, and Recent Results was reviewed with the receiving nurse.    Opportunity for questions and clarification was provided.      Assessment completed upon patient's arrival to unit and care assumed.      Initial RN admission and assessment performed and documented in Endoscopy navigator.     Patient evaluated by anesthesia in pre-procedure holding.     All procedural vital signs, airway assessment, and level of consciousness information monitored and recorded by anesthesia staff on the anesthesia record.     Report received from CRNA post procedure.  Patient transported to recovery area by RN.    Endoscopy post procedure time out was performed and specimens were verified by physician.    Endoscope was pre-cleaned at bedside immediately following procedure by Brandee BERNAL    TRANSFER - OUT REPORT:    Verbal report given to Lanie HAQUE on Kashif Jiangin .  being transferred to [facility] for routine post-op       Report consisted of patient's Situation, Background, Assessment and   Recommendations(SBAR).     Information from the following report(s) Nurse Handoff Report and Surgery Report was reviewed with the receiving nurse.           Lines:   Peripheral IV 05/21/25 Posterior;Right Forearm (Active)   Site Assessment Clean, dry & intact 05/21/25 1854   Line Status Infusing 05/21/25 1854   Line Care Cap changed;Connections checked and tightened;Ports disinfected 05/21/25 1854   Phlebitis Assessment No symptoms 05/21/25 1854   Infiltration Assessment 0 05/21/25 1854   Alcohol Cap Used Yes 05/21/25 1854   Dressing Status Clean, dry & intact 05/21/25 1854   Dressing Type Transparent 05/21/25 1854        Opportunity for

## 2025-05-22 NOTE — CONSENT
Informed Consent for Blood Component Transfusion Note    I have discussed with the patient the rationale for blood component transfusion; its benefits in treating or preventing fatigue, organ damage, or death; and its risk which includes mild transfusion reactions, rare risk of blood borne infection, or more serious but rare reactions. I have discussed the alternatives to transfusion, including the risk and consequences of not receiving transfusion. The patient had an opportunity to ask questions and had agreed to proceed with transfusion of blood components.    Electronically signed by SUNDAY Garcia on 5/22/25 at 12:24 AM EDT

## 2025-05-22 NOTE — SIGNIFICANT EVENT
05/22/25 at 2351    A rapid response was called on this patient for Chest Pain.    Response    Rapid Response Team present for evaluation.    Chiquita Nuno NP, RT, nursing sup Bethany/Janine, and primary RN responding at the bedside.    Per RN, pt yelled out in pain. When asked where the pain is located, pt points to midchest stating \"stomach\". EKG completed, sinus tach. Pt diaphoretic. Had bloody bowel movement prior. Pt nauseous. Labs drawn and sent to lab. CXR order pending. Meds ordered, primary RN to give. IVF bolus 500 ml started for low BP.       The patient was left in the care of his primary nursing team.        Rapid Response Team Sepsis Screening  Is the patient's history suggestive of a new infection? No    Are two or more SIRS criteria present? No    Is there evidence of Organ Dysfunction? [facility] Sepsis OD: None    Communication with provider: No    Was a Code Sepsis called at this encounter? No. Why? Not indicated

## 2025-05-23 LAB
ANION GAP SERPL CALC-SCNC: 5 MMOL/L (ref 2–12)
BASOPHILS # BLD: 0.02 K/UL (ref 0–0.1)
BASOPHILS NFR BLD: 0.3 % (ref 0–1)
BUN SERPL-MCNC: 16 MG/DL (ref 6–20)
BUN/CREAT SERPL: 18 (ref 12–20)
CALCIUM SERPL-MCNC: 8.6 MG/DL (ref 8.5–10.1)
CHLORIDE SERPL-SCNC: 108 MMOL/L (ref 97–108)
CO2 SERPL-SCNC: 29 MMOL/L (ref 21–32)
CREAT SERPL-MCNC: 0.9 MG/DL (ref 0.7–1.3)
DIFFERENTIAL METHOD BLD: ABNORMAL
EOSINOPHIL # BLD: 0.01 K/UL (ref 0–0.4)
EOSINOPHIL NFR BLD: 0.2 % (ref 0–7)
ERYTHROCYTE [DISTWIDTH] IN BLOOD BY AUTOMATED COUNT: 12.2 % (ref 11.5–14.5)
ERYTHROCYTE [DISTWIDTH] IN BLOOD BY AUTOMATED COUNT: 12.5 % (ref 11.5–14.5)
FOLATE SERPL-MCNC: 19.9 NG/ML (ref 5–21)
GLUCOSE SERPL-MCNC: 113 MG/DL (ref 65–100)
HCT VFR BLD AUTO: 22.6 % (ref 36.6–50.3)
HCT VFR BLD AUTO: 23.1 % (ref 36.6–50.3)
HGB BLD-MCNC: 7.5 G/DL (ref 12.1–17)
HGB BLD-MCNC: 7.8 G/DL (ref 12.1–17)
HISTORY CHECK: NORMAL
IMM GRANULOCYTES # BLD AUTO: 0.01 K/UL (ref 0–0.04)
IMM GRANULOCYTES NFR BLD AUTO: 0.2 % (ref 0–0.5)
LYMPHOCYTES # BLD: 0.99 K/UL (ref 0.8–3.5)
LYMPHOCYTES NFR BLD: 16 % (ref 12–49)
MAGNESIUM SERPL-MCNC: 1.9 MG/DL (ref 1.6–2.4)
MCH RBC QN AUTO: 33.8 PG (ref 26–34)
MCH RBC QN AUTO: 33.8 PG (ref 26–34)
MCHC RBC AUTO-ENTMCNC: 33.2 G/DL (ref 30–36.5)
MCHC RBC AUTO-ENTMCNC: 33.8 G/DL (ref 30–36.5)
MCV RBC AUTO: 100 FL (ref 80–99)
MCV RBC AUTO: 101.8 FL (ref 80–99)
MONOCYTES # BLD: 0.57 K/UL (ref 0–1)
MONOCYTES NFR BLD: 9.2 % (ref 5–13)
NEUTS SEG # BLD: 4.57 K/UL (ref 1.8–8)
NEUTS SEG NFR BLD: 74.1 % (ref 32–75)
NRBC # BLD: 0 K/UL (ref 0–0.01)
NRBC # BLD: 0 K/UL (ref 0–0.01)
NRBC BLD-RTO: 0 PER 100 WBC
NRBC BLD-RTO: 0 PER 100 WBC
PLATELET # BLD AUTO: 117 K/UL (ref 150–400)
PLATELET # BLD AUTO: 124 K/UL (ref 150–400)
PMV BLD AUTO: 10.5 FL (ref 8.9–12.9)
PMV BLD AUTO: 11.9 FL (ref 8.9–12.9)
POTASSIUM SERPL-SCNC: 4.3 MMOL/L (ref 3.5–5.1)
RBC # BLD AUTO: 2.22 M/UL (ref 4.1–5.7)
RBC # BLD AUTO: 2.31 M/UL (ref 4.1–5.7)
SODIUM SERPL-SCNC: 142 MMOL/L (ref 136–145)
VIT B12 SERPL-MCNC: 505 PG/ML (ref 193–986)
WBC # BLD AUTO: 5.7 K/UL (ref 4.1–11.1)
WBC # BLD AUTO: 6.2 K/UL (ref 4.1–11.1)

## 2025-05-23 PROCEDURE — 80048 BASIC METABOLIC PNL TOTAL CA: CPT

## 2025-05-23 PROCEDURE — 30233N1 TRANSFUSION OF NONAUTOLOGOUS RED BLOOD CELLS INTO PERIPHERAL VEIN, PERCUTANEOUS APPROACH: ICD-10-PCS | Performed by: FAMILY MEDICINE

## 2025-05-23 PROCEDURE — 6370000000 HC RX 637 (ALT 250 FOR IP): Performed by: HOSPITALIST

## 2025-05-23 PROCEDURE — 85027 COMPLETE CBC AUTOMATED: CPT

## 2025-05-23 PROCEDURE — 36430 TRANSFUSION BLD/BLD COMPNT: CPT

## 2025-05-23 PROCEDURE — 2500000003 HC RX 250 WO HCPCS: Performed by: FAMILY MEDICINE

## 2025-05-23 PROCEDURE — 1100000000 HC RM PRIVATE

## 2025-05-23 PROCEDURE — 82746 ASSAY OF FOLIC ACID SERUM: CPT

## 2025-05-23 PROCEDURE — 85025 COMPLETE CBC W/AUTO DIFF WBC: CPT

## 2025-05-23 PROCEDURE — P9016 RBC LEUKOCYTES REDUCED: HCPCS

## 2025-05-23 PROCEDURE — 83735 ASSAY OF MAGNESIUM: CPT

## 2025-05-23 PROCEDURE — 82607 VITAMIN B-12: CPT

## 2025-05-23 PROCEDURE — 6370000000 HC RX 637 (ALT 250 FOR IP): Performed by: FAMILY MEDICINE

## 2025-05-23 PROCEDURE — 6360000002 HC RX W HCPCS: Performed by: FAMILY MEDICINE

## 2025-05-23 PROCEDURE — 2580000003 HC RX 258: Performed by: FAMILY MEDICINE

## 2025-05-23 RX ORDER — FERROUS SULFATE 325(65) MG
325 TABLET ORAL
Status: DISCONTINUED | OUTPATIENT
Start: 2025-05-23 | End: 2025-05-24 | Stop reason: HOSPADM

## 2025-05-23 RX ORDER — SODIUM CHLORIDE 9 MG/ML
INJECTION, SOLUTION INTRAVENOUS PRN
Status: DISCONTINUED | OUTPATIENT
Start: 2025-05-23 | End: 2025-05-24 | Stop reason: HOSPADM

## 2025-05-23 RX ADMIN — ACETAMINOPHEN 650 MG: 325 TABLET ORAL at 10:03

## 2025-05-23 RX ADMIN — SODIUM CHLORIDE 40 MG: 9 INJECTION INTRAMUSCULAR; INTRAVENOUS; SUBCUTANEOUS at 15:18

## 2025-05-23 RX ADMIN — SODIUM CHLORIDE, PRESERVATIVE FREE 10 ML: 5 INJECTION INTRAVENOUS at 20:21

## 2025-05-23 RX ADMIN — BUPRENORPHINE HYDROCHLORIDE AND NALOXONE HYDROCHLORIDE DIHYDRATE 1 TABLET: 8; 2 TABLET SUBLINGUAL at 20:21

## 2025-05-23 RX ADMIN — SODIUM CHLORIDE 40 MG: 9 INJECTION INTRAMUSCULAR; INTRAVENOUS; SUBCUTANEOUS at 02:34

## 2025-05-23 RX ADMIN — SODIUM CHLORIDE, PRESERVATIVE FREE 10 ML: 5 INJECTION INTRAVENOUS at 10:07

## 2025-05-23 RX ADMIN — FERROUS SULFATE TAB 325 MG (65 MG ELEMENTAL FE) 325 MG: 325 (65 FE) TAB at 10:09

## 2025-05-23 RX ADMIN — SODIUM CHLORIDE, SODIUM LACTATE, POTASSIUM CHLORIDE, AND CALCIUM CHLORIDE: .6; .31; .03; .02 INJECTION, SOLUTION INTRAVENOUS at 10:03

## 2025-05-23 RX ADMIN — ATORVASTATIN CALCIUM 10 MG: 20 TABLET, FILM COATED ORAL at 10:03

## 2025-05-23 RX ADMIN — BUPRENORPHINE HYDROCHLORIDE AND NALOXONE HYDROCHLORIDE DIHYDRATE 1 TABLET: 8; 2 TABLET SUBLINGUAL at 10:03

## 2025-05-23 ASSESSMENT — PAIN SCALES - GENERAL
PAINLEVEL_OUTOF10: 0
PAINLEVEL_OUTOF10: 5

## 2025-05-23 ASSESSMENT — PAIN DESCRIPTION - LOCATION: LOCATION: HEAD

## 2025-05-23 ASSESSMENT — PAIN DESCRIPTION - DESCRIPTORS: DESCRIPTORS: ACHING

## 2025-05-23 NOTE — PROGRESS NOTES
[facility]  Hospitalist Group                                                                                          Hospitalist Progress Note  Crystal Gomez MD  Office Phone: [phone]        Date of Service:  2025  NAME:  Kashif Montes De Oca Jr.  :  1950  MRN:  [id number]       Admission Summary:   75 y.o. male who presents with chief complaint of passing black stools.  Patient reports 3 black stools since yesterday along with some generalized weakness, report somewhat lightheaded.  Patient has history of gastric ulcer.  Reports prior history of GI bleed back in 2017.  Patient presented with stable vital signs, noted somewhat hypertensive.  Labs remarkable for mild elevated BUN of 30.  And hemoglobin is borderline low at 11.5.  Otherwise patient remains hemodynamically stable in the ER.  Patient received 40 mg of IV Protonix and hospitalist service requested admit the patient for further evaluation management.        Interval history / Subjective:   Still passing dark stools.  Feels light-headed and short of breath with minimal activity.       Assessment & Plan:     GI bleed  - EGD today showed 3 clean-based antral ulcers  - continue PPI     Acute blood loss anemia  - given persistent s/s of anemia, ongoing melena and degree of Hb drop, will transfuse 1U PRBC. Risks/benefits explained to patient and he provided consent to proceed with PRBC transfusion.    Hypertension  - holding antihypertensives with hypotension     Dyslipidemia  - Continue statin     Opiate dependence  - On Suboxone     Alcohol dependence  - Monitor for withdrawal          Code status: full  Prophylaxis: SCDs  Care Plan discussed with: pt  Anticipated Disposition:   Inpatient  Cardiac monitoring: Telemetry  Central Line:       CRITICAL CARE ATTESTATION:  I had a face to face encounter with the patient, reviewed and interpreted patient data including clinical events, labs, images, vital signs, I/O's,  with this patient and independently examined them on 5/23/2025 as outlined below:          General : alert x 3, awake, no acute distress,   HEENT: PEERL, EOMI, moist mucus membrane, pale conjunctiva  Neck: supple, no JVD, no meningeal signs  Chest: Clear to auscultation bilaterally   CVS: S1 S2 heard, Capillary refill less than 2 seconds  Abd: soft/ non tender, non distended, BS physiological,   Ext: no clubbing, no cyanosis, no edema, brisk 2+ DP pulses  Neuro/Psych:grossly non-focal  Skin: warm pale    Data Review:    Review and/or order of clinical lab test  Review and/or order of tests in the radiology section of CPT  Review and/or order of tests in the medicine section of CPT      I have personally and independently reviewed all pertinent labs, diagnostic studies, imaging, and have provided independent interpretation of the same.     Labs:     Recent Labs     05/22/25  0410 05/23/25  0307   WBC 6.4 5.7   HGB 8.8* 7.5*   HCT 27.1* 22.6*   * 117*     Recent Labs     05/21/25  2358 05/22/25  0410 05/23/25  0307    140 142   K 3.9 4.9 4.3    108 108   CO2 25 28 29   BUN 29* 28* 16   MG  --   --  1.9     Recent Labs     05/21/25  1240 05/21/25  2358   ALT 16 19   GLOB 2.9 2.5     No results for input(s): \"INR\", \"APTT\" in the last 72 hours.    Invalid input(s): \"PTP\"   No results for input(s): \"TIBC\" in the last 72 hours.    Invalid input(s): \"FE\", \"PSAT\", \"FERR\"   No results found for: \"RBCF\"   No results for input(s): \"PH\", \"PCO2\", \"PO2\" in the last 72 hours.  No results for input(s): \"CPK\" in the last 72 hours.    Invalid input(s): \"CPKMB\", \"CKNDX\", \"TROIQ\"  Lab Results   Component Value Date/Time    CHOL 221 02/19/2025 12:00 AM     02/19/2025 12:00 AM    LDL 57 02/19/2025 12:00 AM    LDL 74 10/09/2023 12:00 AM     No results found for: \"GLUCPOC\"  [unfilled]    Notes reviewed from all clinical/nonclinical/nursing services involved in patient's clinical care. Care coordination discussions

## 2025-05-23 NOTE — PROGRESS NOTES
DEMARCUS HAYSE   [address] [address]       GI PROGRESS NOTE  Yola Logan PA-C  [phone] office  NP/PA in-hospital M-F until 4:30PM  After 5PM or on weekends, please call  for physician on call      NAME: Kashif Montes De Oca Jr.   :  1950   MRN:  [id number]       Subjective:     Patient resting comfortably in bed. Reports having a dark stool this morning but improved from hospital presentation. No abdominal pain. Tolerating regular diet although states eating slow. No nausea or vomiting.     Objective:     VITALS:   Last 24hrs VS reviewed since prior progress note. Most recent are:  Vitals:    25 0715   BP: 115/89   Pulse: 76   Resp: 14   Temp: 98.2 °F (36.8 °C)   SpO2: 94%       PHYSICAL EXAM:  General: Cooperative, no acute distress    Neurologic:  Alert and oriented X 3.  HEENT: EOMI, no scleral icterus   Lungs:  CTA bilaterally. No wheezing  Heart:  S1 S2, regular rhythm  Abdomen: Soft, non-distended, no tenderness. +Bowel sounds  Extremities: No edema  Psych:   Good insight. Not anxious or agitated.    Lab Data Reviewed:     Recent Results (from the past 24 hours)   CBC    Collection Time: 25  3:07 AM   Result Value Ref Range    WBC 5.7 4.1 - 11.1 K/uL    RBC 2.22 (L) 4.10 - 5.70 M/uL    Hemoglobin 7.5 (L) 12.1 - 17.0 g/dL    Hematocrit 22.6 (L) 36.6 - 50.3 %    .8 (H) 80.0 - 99.0 FL    MCH 33.8 26.0 - 34.0 PG    MCHC 33.2 30.0 - 36.5 g/dL    RDW 12.5 11.5 - 14.5 %    Platelets 117 (L) 150 - 400 K/uL    MPV 10.5 8.9 - 12.9 FL    Nucleated RBCs 0.0 0  WBC    nRBC 0.00 0.00 - 0.01 K/uL   Basic Metabolic Panel    Collection Time: 25  3:07 AM   Result Value Ref Range    Sodium 142 136 - 145 mmol/L    Potassium 4.3 3.5 - 5.1 mmol/L    Chloride 108 97 - 108 mmol/L    CO2 29 21 - 32 mmol/L    Anion Gap 5 2 - 12 mmol/L    Glucose 113 (H) 65 - 100 mg/dL    BUN 16 6 - 20 MG/DL    Creatinine 0.90 0.70 - 1.30 MG/DL    BUN/Creatinine Ratio

## 2025-05-24 VITALS
TEMPERATURE: 98.4 F | DIASTOLIC BLOOD PRESSURE: 95 MMHG | HEIGHT: 70 IN | RESPIRATION RATE: 16 BRPM | BODY MASS INDEX: 23.13 KG/M2 | OXYGEN SATURATION: 94 % | SYSTOLIC BLOOD PRESSURE: 131 MMHG | WEIGHT: 161.6 LBS | HEART RATE: 83 BPM

## 2025-05-24 LAB
ABO + RH BLD: NORMAL
BASOPHILS # BLD: 0.02 K/UL (ref 0–0.1)
BASOPHILS NFR BLD: 0.3 % (ref 0–1)
BLD PROD TYP BPU: NORMAL
BLD PROD TYP BPU: NORMAL
BLOOD BANK BLOOD PRODUCT EXPIRATION DATE: NORMAL
BLOOD BANK DISPENSE STATUS: NORMAL
BLOOD BANK DISPENSE STATUS: NORMAL
BLOOD BANK ISBT PRODUCT BLOOD TYPE: 5100
BLOOD BANK PRODUCT CODE: NORMAL
BLOOD BANK UNIT TYPE AND RH: NORMAL
BLOOD GROUP ANTIBODIES SERPL: NORMAL
BPU ID: NORMAL
BPU ID: NORMAL
CROSSMATCH RESULT: NORMAL
CROSSMATCH RESULT: NORMAL
DIFFERENTIAL METHOD BLD: ABNORMAL
EOSINOPHIL # BLD: 0.16 K/UL (ref 0–0.4)
EOSINOPHIL NFR BLD: 2.7 % (ref 0–7)
ERYTHROCYTE [DISTWIDTH] IN BLOOD BY AUTOMATED COUNT: 13.1 % (ref 11.5–14.5)
HCT VFR BLD AUTO: 25.6 % (ref 36.6–50.3)
HGB BLD-MCNC: 8.5 G/DL (ref 12.1–17)
IMM GRANULOCYTES # BLD AUTO: 0.02 K/UL (ref 0–0.04)
IMM GRANULOCYTES NFR BLD AUTO: 0.3 % (ref 0–0.5)
LYMPHOCYTES # BLD: 1.59 K/UL (ref 0.8–3.5)
LYMPHOCYTES NFR BLD: 26.8 % (ref 12–49)
MCH RBC QN AUTO: 34.3 PG (ref 26–34)
MCHC RBC AUTO-ENTMCNC: 33.2 G/DL (ref 30–36.5)
MCV RBC AUTO: 103.2 FL (ref 80–99)
MONOCYTES # BLD: 0.7 K/UL (ref 0–1)
MONOCYTES NFR BLD: 11.8 % (ref 5–13)
NEUTS SEG # BLD: 3.45 K/UL (ref 1.8–8)
NEUTS SEG NFR BLD: 58.1 % (ref 32–75)
NRBC # BLD: 0 K/UL (ref 0–0.01)
NRBC BLD-RTO: 0 PER 100 WBC
PLATELET # BLD AUTO: 123 K/UL (ref 150–400)
PMV BLD AUTO: 10.9 FL (ref 8.9–12.9)
RBC # BLD AUTO: 2.48 M/UL (ref 4.1–5.7)
SPECIMEN EXP DATE BLD: NORMAL
UNIT DIVISION: 0
UNIT DIVISION: NORMAL
UNIT ISSUE DATE/TIME: NORMAL
WBC # BLD AUTO: 5.9 K/UL (ref 4.1–11.1)

## 2025-05-24 PROCEDURE — 2580000003 HC RX 258: Performed by: FAMILY MEDICINE

## 2025-05-24 PROCEDURE — 6370000000 HC RX 637 (ALT 250 FOR IP): Performed by: FAMILY MEDICINE

## 2025-05-24 PROCEDURE — 2500000003 HC RX 250 WO HCPCS: Performed by: FAMILY MEDICINE

## 2025-05-24 PROCEDURE — 6360000002 HC RX W HCPCS: Performed by: FAMILY MEDICINE

## 2025-05-24 PROCEDURE — 85025 COMPLETE CBC W/AUTO DIFF WBC: CPT

## 2025-05-24 PROCEDURE — 94760 N-INVAS EAR/PLS OXIMETRY 1: CPT

## 2025-05-24 PROCEDURE — 6370000000 HC RX 637 (ALT 250 FOR IP): Performed by: HOSPITALIST

## 2025-05-24 RX ORDER — PANTOPRAZOLE SODIUM 40 MG/1
TABLET, DELAYED RELEASE ORAL
Qty: 120 TABLET | Refills: 0 | Status: SHIPPED | OUTPATIENT
Start: 2025-05-24 | End: 2025-05-29 | Stop reason: SDUPTHER

## 2025-05-24 RX ORDER — FERROUS SULFATE 325(65) MG
325 TABLET ORAL
Qty: 30 TABLET | Refills: 3 | Status: SHIPPED | OUTPATIENT
Start: 2025-05-25

## 2025-05-24 RX ADMIN — SODIUM CHLORIDE, PRESERVATIVE FREE 10 ML: 5 INJECTION INTRAVENOUS at 08:29

## 2025-05-24 RX ADMIN — SODIUM CHLORIDE 40 MG: 9 INJECTION INTRAMUSCULAR; INTRAVENOUS; SUBCUTANEOUS at 02:43

## 2025-05-24 RX ADMIN — ATORVASTATIN CALCIUM 10 MG: 20 TABLET, FILM COATED ORAL at 08:24

## 2025-05-24 RX ADMIN — METOPROLOL SUCCINATE 100 MG: 25 TABLET, FILM COATED, EXTENDED RELEASE ORAL at 08:25

## 2025-05-24 RX ADMIN — BUPRENORPHINE HYDROCHLORIDE AND NALOXONE HYDROCHLORIDE DIHYDRATE 1 TABLET: 8; 2 TABLET SUBLINGUAL at 08:25

## 2025-05-24 RX ADMIN — FERROUS SULFATE TAB 325 MG (65 MG ELEMENTAL FE) 325 MG: 325 (65 FE) TAB at 08:25

## 2025-05-24 NOTE — DISCHARGE SUMMARY
Discharge Summary       PATIENT ID: Kashif Montes De Oca Jr.  MRN: [id number]   YOB: 1950    DATE OF ADMISSION: 5/21/2025 10:25 AM    DATE OF DISCHARGE: 5/24/2025   PRIMARY CARE PROVIDER: Melchor Briscoe MD     ATTENDING PHYSICIAN: Jason  DISCHARGING PROVIDER: Crystal Gomez MD    To contact this individual call [phone] and ask the  to page.  If unavailable ask to be transferred the Adult Hospitalist Department.    CONSULTATIONS: IP CONSULT TO GI    PROCEDURES/SURGERIES: Procedure(s):  ESOPHAGOGASTRODUODENOSCOPY     ADMITTING DIAGNOSES & HOSPITAL COURSE:   75 y.o. male who presents with chief complaint of passing black stools. Patient reports 3 black stools since yesterday along with some generalized weakness, report somewhat lightheaded. Patient has history of gastric ulcer. Reports prior history of GI bleed back in 2017. Patient presented with stable vital signs, noted somewhat hypertensive. Labs remarkable for mild elevated BUN of 30. And hemoglobin is borderline low at 11.5.    GI bleed  Acute blood loss anemia  - EGD today showed 3 clean-based antral ulcers  - continue PPI  - hb dropped all the way to 7.5, s/p 1U PRBC transfusion on 5/23 due to symptomatic anemia  - avoid NSAIDs  - plan for repeat EGD in 2 months     Hypertension  - resumed metoprolol, other meds stopped. Resume outpatient if BP dictates.     Dyslipidemia  - Continue statin     Opiate dependence  - On Suboxone     Alcohol dependence  -  cessation    DISCHARGE DIAGNOSES / PLAN:        FOLLOW UP APPOINTMENTS:    Follow-up Information       Follow up With Specialties Details Why Contact Info    Melchor Briscoe MD Internal Medicine   [address]  [facility] [address] [phone]      Sancho Palma MD Gastroenterology Schedule an appointment as soon as possible for a visit in 2 month(s)  [address]  [facility] [address] [phone]                DISCHARGE

## 2025-05-24 NOTE — FLOWSHEET NOTE
05/24/25 1220   AVS Reviewed   AVS & discharge instructions reviewed with patient and/or representative? Yes   Reviewed instructions with Patient   Level of Understanding Questions answered;Verbalized understanding     RN removed IV and helped pt pack up belongings. Tech wheeled pt down to main entrance for discharge.

## 2025-05-27 ENCOUNTER — TELEPHONE (OUTPATIENT)
Dept: PRIMARY CARE CLINIC | Facility: CLINIC | Age: 75
End: 2025-05-27

## 2025-05-27 NOTE — TELEPHONE ENCOUNTER
Care Transitions Initial Follow Up Call    Outreach made within 2 business days of discharge: Yes    Patient: Kashif Montes De Oca Jr. Patient : 1950   MRN: [id number]  Reason for Admission: GI Bleed   Discharge Date: 25       Spoke with: patient    Discharge department/facility: [facility] Interactive Patient Contact:  Was patient able to fill all prescriptions: Yes  Was patient instructed to bring all medications to the follow-up visit: Yes  Is patient taking all medications as directed in the discharge summary? Has to pickup one rx today  Does patient understand their discharge instructions: Yes  Does patient have questions or concerns that need addressed prior to 7-14 day follow up office visit: no    Additional needs identified to be addressed with provider  No needs identified             Scheduled appointment with PCP within 7-14 days    Follow Up  No future appointments.    Sofy Logan LPN

## 2025-05-27 NOTE — TELEPHONE ENCOUNTER
[facility] [place] [facility]  Staff (supporting Melchor Briscoe MD)15 hours ago (9:10 PM)     RP  Appointment Request From: Kashif Montes De Oca Jr.     With Provider: Dr. Melchor Briscoe MD [[facility]]     Preferred Date Range: 5/27/2025 - 5/29/2025     Preferred Times: Tuesday Morning, Tuesday Afternoon, Wednesday Morning, Wednesday Afternoon     Reason for visit: Request an Appointment     Health Maintenance Topic:      Comments:  Follow up -Released Spelter (5/23)Uppr GI Bleed & Blood Transfusion.

## 2025-05-27 NOTE — TELEPHONE ENCOUNTER
Patient called in and needs a hospital follow up was Discharged from [facility] on Saturday 5/24, for an upper gi bleed with transfusion.   Where can this patient be worked in at this week.

## 2025-05-28 ENCOUNTER — TELEPHONE (OUTPATIENT)
Dept: PRIMARY CARE CLINIC | Facility: CLINIC | Age: 75
End: 2025-05-28

## 2025-05-28 NOTE — TELEPHONE ENCOUNTER
Patient is calling because the pharmacy will not fill his pantoprazole because of the directions on the medication. The pharmacy needs clarification.      Patient also wants to know when Dr. Briscoe think it ok for him to go back to work.

## 2025-05-28 NOTE — TELEPHONE ENCOUNTER
Left message to call office.    Please schedule appointment per Dr. Briscoe 5/29/2025 at 2:30 pm for hospital follow up.

## 2025-05-29 ENCOUNTER — OFFICE VISIT (OUTPATIENT)
Dept: PRIMARY CARE CLINIC | Facility: CLINIC | Age: 75
End: 2025-05-29

## 2025-05-29 VITALS
WEIGHT: 156.2 LBS | DIASTOLIC BLOOD PRESSURE: 83 MMHG | SYSTOLIC BLOOD PRESSURE: 123 MMHG | OXYGEN SATURATION: 99 % | TEMPERATURE: 97.8 F | RESPIRATION RATE: 12 BRPM | BODY MASS INDEX: 22.36 KG/M2 | HEIGHT: 70 IN | HEART RATE: 62 BPM

## 2025-05-29 DIAGNOSIS — K92.2 UPPER GI BLEED: Primary | ICD-10-CM

## 2025-05-29 DIAGNOSIS — K25.0 ACUTE GASTRIC ULCER WITH HEMORRHAGE: ICD-10-CM

## 2025-05-29 DIAGNOSIS — I10 ESSENTIAL (PRIMARY) HYPERTENSION: ICD-10-CM

## 2025-05-29 RX ORDER — SUCRALFATE 1 G/1
1 TABLET ORAL 4 TIMES DAILY
Qty: 120 TABLET | Refills: 3 | Status: SHIPPED | OUTPATIENT
Start: 2025-05-29

## 2025-05-29 RX ORDER — PANTOPRAZOLE SODIUM 40 MG/1
40 TABLET, DELAYED RELEASE ORAL
Qty: 180 TABLET | Refills: 1 | Status: SHIPPED | OUTPATIENT
Start: 2025-05-29 | End: 2025-11-25

## 2025-05-29 SDOH — ECONOMIC STABILITY: FOOD INSECURITY: WITHIN THE PAST 12 MONTHS, YOU WORRIED THAT YOUR FOOD WOULD RUN OUT BEFORE YOU GOT MONEY TO BUY MORE.: NEVER TRUE

## 2025-05-29 SDOH — ECONOMIC STABILITY: FOOD INSECURITY: WITHIN THE PAST 12 MONTHS, THE FOOD YOU BOUGHT JUST DIDN'T LAST AND YOU DIDN'T HAVE MONEY TO GET MORE.: NEVER TRUE

## 2025-05-29 ASSESSMENT — PATIENT HEALTH QUESTIONNAIRE - PHQ9
2. FEELING DOWN, DEPRESSED OR HOPELESS: NOT AT ALL
SUM OF ALL RESPONSES TO PHQ QUESTIONS 1-9: 0
1. LITTLE INTEREST OR PLEASURE IN DOING THINGS: NOT AT ALL

## 2025-05-29 NOTE — PROGRESS NOTES
\"Have you been to the ER, urgent care clinic since your last visit?  Hospitalized since your last visit?\"    05/21-05/24/2025 pt was admitted to [facility] for GI bleed     “Have you seen or consulted any other health care providers outside of [facility] since your last visit?”    NO          Click Here for Release of Records Request   
tablet Take 1 tablet by mouth daily (with breakfast)    atorvastatin (LIPITOR) 10 MG tablet TAKE 1 TABLET BY MOUTH EVERY DAY    fluticasone (FLONASE) 50 MCG/ACT nasal spray SPRAY 2 SPRAYS BY NASAL ROUTE DAILY. (Patient taking differently: as needed)    metoprolol succinate (TOPROL XL) 100 MG extended release tablet TAKE 1 TABLET BY MOUTH EVERY DAY    Multiple Vitamin (MULTIVITAMIN ADULT PO) Take 1 tablet by mouth daily    buprenorphine-naloxone (SUBOXONE) 8-2 MG SUBL SL tablet Place 1 tablet under the tongue in the morning and at bedtime. Pt states that he takes one tab in the AM and one tab in the evenings     No current facility-administered medications for this visit.     Health Maintenance   Topic Date Due    Hepatitis B vaccine (1 of 3 - Risk 3-dose series) Never done    Respiratory Syncytial Virus (RSV) Pregnant or age 60 yrs+ (1 - Risk 60-74 years 1-dose series) Never done    AAA screen  Never done    COVID-19 Vaccine (7 - 2024-25 season) 09/01/2024    DTaP/Tdap/Td vaccine (2 - Td or Tdap) 05/18/2025    Flu vaccine (Season Ended) 08/01/2025    Lipids  02/19/2026    Depression Screen  02/19/2026    Colorectal Cancer Screen  11/16/2028    Shingles vaccine  Completed    Pneumococcal 50+ years Vaccine  Completed    Hepatitis C screen  Completed    Hepatitis A vaccine  Aged Out    Hib vaccine  Aged Out    Polio vaccine  Aged Out    Meningococcal (ACWY) vaccine  Aged Out    Meningococcal B vaccine  Aged Out    A1C test (Diabetic or Prediabetic)  Discontinued    GFR test (Diabetes, CKD 3-4, OR last GFR 15-59)  Discontinued     Immunization History   Administered Date(s) Administered    COVID-19, MODERNA BLUE border, Primary or Immunocompromised, (age 12y+), IM, 100 mcg/0.5mL 02/20/2021, 03/22/2021, 10/29/2021, 11/17/2021, 05/06/2022, 09/29/2022    Influenza Trivalent 01/11/2013, 11/20/2015    Influenza Virus Vaccine 10/28/2011, 11/14/2014    Influenza, FLUAD, (age 65 y+), IM, Trivalent PF, 0.5mL 03/18/2019

## 2025-05-30 ENCOUNTER — TELEPHONE (OUTPATIENT)
Dept: PRIMARY CARE CLINIC | Facility: CLINIC | Age: 75
End: 2025-05-30

## 2025-05-30 DIAGNOSIS — I10 PRIMARY HYPERTENSION: ICD-10-CM

## 2025-05-30 NOTE — ANESTHESIA POSTPROCEDURE EVALUATION
Department of Anesthesiology  Postprocedure Note    Patient: Kashif Montes De Oca Jr.  MRN: [id number]  YOB: 1950  Date of evaluation: 5/30/2025    Procedure Summary       Date: 05/22/25 Room / Location: [facility] 02 / [facility] ENDOSCOPY    Anesthesia Start: 1128 Anesthesia Stop: 1147    Procedure: ESOPHAGOGASTRODUODENOSCOPY (Upper GI Region) Diagnosis:       Epigastric abdominal pain      (Epigastric abdominal pain [R10.13])    Surgeons: Sancho Palma MD Responsible Provider: Jeffery Berg MD    Anesthesia Type: MAC ASA Status: 3            Anesthesia Type: MAC    Adam Phase I: Adam Score: 10    Adam Phase II: Adam Score: 10    Anesthesia Post Evaluation    Patient location during evaluation: bedside  Nausea & Vomiting: no nausea  Cardiovascular status: blood pressure returned to baseline  Respiratory status: acceptable  Hydration status: euvolemic    No notable events documented.

## 2025-05-30 NOTE — TELEPHONE ENCOUNTER
Approval received from insurance for Pantoprazole.    Outcome  Approved today by [employer] 2017  CaseId:[id number];Status:Approved;Review Type:Qty;Coverage Start Date:05/30/2025;Coverage End Date:08/30/2025;  Effective Date: 5/30/2025  Authorization Expiration Date: 8/30/2025    Pharmacy notified  Patient notified

## 2025-06-02 RX ORDER — METOPROLOL SUCCINATE 100 MG/1
100 TABLET, EXTENDED RELEASE ORAL DAILY
Qty: 90 TABLET | Refills: 1 | Status: SHIPPED | OUTPATIENT
Start: 2025-06-02

## 2025-06-02 NOTE — TELEPHONE ENCOUNTER
PCP: Melchor Briscoe MD    Last Visit 5/29/2025   Future Appointments   Date Time Provider Department Center   8/29/2025  9:00 AM Melchor Briscoe MD [facility]   9/4/2025  8:00 AM [facility]       Requested Prescriptions     Pending Prescriptions Disp Refills    metoprolol succinate (TOPROL XL) 100 MG extended release tablet [Pharmacy Med Name: METOPROLOL SUCC  MG TAB] 90 tablet 1     Sig: TAKE 1 TABLET BY MOUTH EVERY DAY         Other Comments: Last Refill

## 2025-06-23 ENCOUNTER — HOSPITAL ENCOUNTER (EMERGENCY)
Facility: HOSPITAL | Age: 75
Discharge: HOME | End: 2025-06-23
Attending: EMERGENCY MEDICINE
Payer: COMMERCIAL

## 2025-06-23 ENCOUNTER — APPOINTMENT (OUTPATIENT)
Facility: HOSPITAL | Age: 75
End: 2025-06-23
Payer: COMMERCIAL

## 2025-06-23 VITALS
WEIGHT: 158.29 LBS | TEMPERATURE: 97.5 F | BODY MASS INDEX: 22.71 KG/M2 | DIASTOLIC BLOOD PRESSURE: 91 MMHG | OXYGEN SATURATION: 95 % | SYSTOLIC BLOOD PRESSURE: 126 MMHG | HEART RATE: 59 BPM | RESPIRATION RATE: 16 BRPM

## 2025-06-23 DIAGNOSIS — M10.071 ACUTE IDIOPATHIC GOUT INVOLVING TOE OF RIGHT FOOT: Primary | ICD-10-CM

## 2025-06-23 LAB
ALBUMIN SERPL-MCNC: 3.8 G/DL (ref 3.5–5)
ALBUMIN/GLOB SERPL: 1.2 (ref 1.1–2.2)
ALP SERPL-CCNC: 38 U/L (ref 45–117)
ALT SERPL-CCNC: 28 U/L (ref 12–78)
ANION GAP SERPL CALC-SCNC: 3 MMOL/L (ref 2–12)
AST SERPL-CCNC: 30 U/L (ref 15–37)
BASOPHILS # BLD: 0.04 K/UL (ref 0–0.1)
BASOPHILS NFR BLD: 0.7 % (ref 0–1)
BILIRUB SERPL-MCNC: 0.8 MG/DL (ref 0.2–1)
BUN SERPL-MCNC: 9 MG/DL (ref 6–20)
BUN/CREAT SERPL: 8 (ref 12–20)
CALCIUM SERPL-MCNC: 9 MG/DL (ref 8.5–10.1)
CHLORIDE SERPL-SCNC: 104 MMOL/L (ref 97–108)
CO2 SERPL-SCNC: 32 MMOL/L (ref 21–32)
COMMENT:: NORMAL
CREAT SERPL-MCNC: 1.14 MG/DL (ref 0.7–1.3)
DIFFERENTIAL METHOD BLD: ABNORMAL
EOSINOPHIL # BLD: 0.01 K/UL (ref 0–0.4)
EOSINOPHIL NFR BLD: 0.2 % (ref 0–7)
ERYTHROCYTE [DISTWIDTH] IN BLOOD BY AUTOMATED COUNT: 13.1 % (ref 11.5–14.5)
GLOBULIN SER CALC-MCNC: 3.3 G/DL (ref 2–4)
GLUCOSE SERPL-MCNC: 98 MG/DL (ref 65–100)
HCT VFR BLD AUTO: 33.6 % (ref 36.6–50.3)
HGB BLD-MCNC: 10.8 G/DL (ref 12.1–17)
IMM GRANULOCYTES # BLD AUTO: 0.01 K/UL (ref 0–0.04)
IMM GRANULOCYTES NFR BLD AUTO: 0.2 % (ref 0–0.5)
LYMPHOCYTES # BLD: 0.96 K/UL (ref 0.8–3.5)
LYMPHOCYTES NFR BLD: 17.6 % (ref 12–49)
MCH RBC QN AUTO: 33.4 PG (ref 26–34)
MCHC RBC AUTO-ENTMCNC: 32.1 G/DL (ref 30–36.5)
MCV RBC AUTO: 104 FL (ref 80–99)
MONOCYTES # BLD: 0.71 K/UL (ref 0–1)
MONOCYTES NFR BLD: 13 % (ref 5–13)
NEUTS SEG # BLD: 3.72 K/UL (ref 1.8–8)
NEUTS SEG NFR BLD: 68.3 % (ref 32–75)
NRBC # BLD: 0 K/UL (ref 0–0.01)
NRBC BLD-RTO: 0 PER 100 WBC
PLATELET # BLD AUTO: 222 K/UL (ref 150–400)
PMV BLD AUTO: 10 FL (ref 8.9–12.9)
POTASSIUM SERPL-SCNC: 3.7 MMOL/L (ref 3.5–5.1)
PROT SERPL-MCNC: 7.1 G/DL (ref 6.4–8.2)
RBC # BLD AUTO: 3.23 M/UL (ref 4.1–5.7)
SODIUM SERPL-SCNC: 139 MMOL/L (ref 136–145)
SPECIMEN HOLD: NORMAL
URATE SERPL-MCNC: 4.2 MG/DL (ref 3.5–7.2)
WBC # BLD AUTO: 5.5 K/UL (ref 4.1–11.1)

## 2025-06-23 PROCEDURE — 80053 COMPREHEN METABOLIC PANEL: CPT

## 2025-06-23 PROCEDURE — 85025 COMPLETE CBC W/AUTO DIFF WBC: CPT

## 2025-06-23 PROCEDURE — 99284 EMERGENCY DEPT VISIT MOD MDM: CPT

## 2025-06-23 PROCEDURE — 73630 X-RAY EXAM OF FOOT: CPT

## 2025-06-23 PROCEDURE — 84550 ASSAY OF BLOOD/URIC ACID: CPT

## 2025-06-23 PROCEDURE — 6370000000 HC RX 637 (ALT 250 FOR IP): Performed by: STUDENT IN AN ORGANIZED HEALTH CARE EDUCATION/TRAINING PROGRAM

## 2025-06-23 RX ORDER — COLCHICINE 0.6 MG/1
1.2 TABLET ORAL ONCE
Status: COMPLETED | OUTPATIENT
Start: 2025-06-23 | End: 2025-06-23

## 2025-06-23 RX ORDER — COLCHICINE 0.6 MG/1
0.6 TABLET ORAL DAILY
Qty: 15 TABLET | Refills: 0 | Status: SHIPPED | OUTPATIENT
Start: 2025-06-23

## 2025-06-23 RX ADMIN — COLCHICINE 1.2 MG: 0.6 TABLET, FILM COATED ORAL at 09:56

## 2025-06-23 ASSESSMENT — ENCOUNTER SYMPTOMS
ABDOMINAL PAIN: 0
COUGH: 0
VOMITING: 0
DIARRHEA: 0
EYE PAIN: 0
COLOR CHANGE: 1
SHORTNESS OF BREATH: 0
NAUSEA: 0
SORE THROAT: 0

## 2025-06-23 ASSESSMENT — PAIN DESCRIPTION - DESCRIPTORS: DESCRIPTORS: OTHER (COMMENT)

## 2025-06-23 ASSESSMENT — PAIN DESCRIPTION - LOCATION: LOCATION: FOOT

## 2025-06-23 ASSESSMENT — PAIN - FUNCTIONAL ASSESSMENT
PAIN_FUNCTIONAL_ASSESSMENT: PREVENTS OR INTERFERES SOME ACTIVE ACTIVITIES AND ADLS
PAIN_FUNCTIONAL_ASSESSMENT: 0-10

## 2025-06-23 ASSESSMENT — PAIN SCALES - GENERAL: PAINLEVEL_OUTOF10: 9

## 2025-06-23 ASSESSMENT — PAIN DESCRIPTION - ORIENTATION: ORIENTATION: RIGHT

## 2025-06-23 NOTE — ED NOTES
Patient discharged by Connor PA with the understanding to follow up with PCP and pick up1 med and start tonight.

## 2025-06-23 NOTE — ED TRIAGE NOTES
P c/o right foot pain that started on Friday, denies injury, ambulatory with slight limp, +swelling per pt with numbness/tingling, +redness and swelling noted to foot

## 2025-06-23 NOTE — ED PROVIDER NOTES
[facility] EMERGENCY DEPARTMENT  EMERGENCY DEPARTMENT ENCOUNTER      Pt Name: Kashif Montes De Oca Jr.  MRN: [id number]  Birthdate 1950  Date of evaluation: 6/23/2025  Provider: GEOVANNA HAYNES    CHIEF COMPLAINT       Chief Complaint   Patient presents with    Foot Pain         HISTORY OF PRESENT ILLNESS   (Location/Symptom, Timing/Onset, Context/Setting, Quality, Duration, Modifying Factors, Severity)  Note limiting factors.   75-year-old male with history of arthritis, hepatitis C, HTN, PUD, BERT presents ED with foot pain. Patient reports that he is having pain to his R foot since Friday 06/20. He denies any inciting injury or fall that he is aware of. Notes some swelling, numbness, tingling. He has not tried anything for his pain. He has no history of similar symptoms. He denies any history of gout.     The history is provided by the patient.         Review of External Medical Records:     Nursing Notes were reviewed.    REVIEW OF SYSTEMS    (2-9 systems for level 4, 10 or more for level 5)     Review of Systems   Constitutional:  Negative for chills and fever.   HENT:  Negative for congestion, ear pain and sore throat.    Eyes:  Negative for pain.   Respiratory:  Negative for cough and shortness of breath.    Cardiovascular:  Negative for chest pain.   Gastrointestinal:  Negative for abdominal pain, diarrhea, nausea and vomiting.   Genitourinary:  Negative for dysuria and flank pain.   Musculoskeletal:  Positive for joint swelling. Negative for myalgias.   Skin:  Positive for color change. Negative for rash.   Neurological:  Negative for dizziness and headaches.   Hematological:  Negative for adenopathy.       Except as noted above the remainder of the review of systems was reviewed and negative.       PAST MEDICAL HISTORY     Past Medical History:   Diagnosis Date    Arthritis     Hepatitis C     Treated    Hypertension     PUD (peptic ulcer disease)     Seizures (HCC)     as a child-none since

## 2025-08-22 ENCOUNTER — TELEPHONE (OUTPATIENT)
Dept: PRIMARY CARE CLINIC | Facility: CLINIC | Age: 75
End: 2025-08-22

## (undated) DEVICE — NEEDLE SCLERO 23GA L4MM CATH L240CM CNTRST SHTH DIA1.8MM

## (undated) DEVICE — BITE BLK ENDOSCP AD 54FR GRN POLYETH ENDOSCP W STRP SLD

## (undated) DEVICE — SPECIAL PROCEDURE DRAPE 32" X 34": Brand: SPECIAL PROCEDURE DRAPE

## (undated) DEVICE — SET ADMIN 16ML TBNG L100IN 2 Y INJ SITE IV PIGGY BK DISP

## (undated) DEVICE — WASTE KIT - ST MARY: Brand: MEDLINE INDUSTRIES, INC.

## (undated) DEVICE — CUFF BLD PRSS CHILD SZ 9 FOR 15-21CM LIMB VYN SFT W/O TB

## (undated) DEVICE — CATHETER ANGIO 5FR L100CM GRY S STL NYL JR4 3 SEG BRAID L

## (undated) DEVICE — CANN NASAL O2 CAPNOGRAPHY AD -- FILTERLINE

## (undated) DEVICE — CATH 5F 100CM JL40 -- DXTERITY

## (undated) DEVICE — GLIDESHEATH SLENDER STAINLESS STEEL KIT: Brand: GLIDESHEATH SLENDER

## (undated) DEVICE — CATH 5F 100CM JL35 -- DXTERITY

## (undated) DEVICE — SET EXTN TBNG L BOR 4 W STPCOCK ST 32IN PRIMING VOL 6ML

## (undated) DEVICE — NEEDLE HYPO 18GA L1.5IN PNK S STL HUB POLYPR SHLD REG BVL

## (undated) DEVICE — ENDO CARRY-ON PROCEDURE KIT INCLUDES ENZYMATIC SPONGE, GAUZE, BIOHAZARD LABEL, TRAY, LUBRICANT, DIRTY SCOPE LABEL, WATER LABEL, TRAY, DRAWSTRING PAD, AND DEFENDO 4-PIECE KIT.: Brand: ENDO CARRY-ON PROCEDURE KIT

## (undated) DEVICE — SYR 50ML SLIP TIP NSAF LF STRL --

## (undated) DEVICE — ANGIOGRAPHY KIT

## (undated) DEVICE — SYRINGE MED 20ML STD CLR PLAS LUERLOCK TIP N CTRL DISP

## (undated) DEVICE — CLIP HEMO ENDOSCP 235CM BX/10 -- RESOLUTION 360

## (undated) DEVICE — KIT IV STRT W CHLORAPREP PD 1ML

## (undated) DEVICE — KIT MED IMAG CNTRST AGNT W/ IOPAMIDOL REUSE

## (undated) DEVICE — PACK PROCEDURE SURG HRT CATH

## (undated) DEVICE — KIT MFLD ISOLATN NACL CNTRST PRT TBNG SPIK W/ PRSS TRNSDUC

## (undated) DEVICE — Device

## (undated) DEVICE — BW-412T DISP COMBO CLEANING BRUSH: Brand: SINGLE USE COMBINATION CLEANING BRUSH

## (undated) DEVICE — FORCEPS BX L240CM JAW DIA2.8MM L CAP W/ NDL MIC MESH TOOTH

## (undated) DEVICE — ORISE PROKNIFE 3.0 MM ELECTRODE: Brand: ORISE™ PROKNIFE

## (undated) DEVICE — GUIDEWIRE VASC L260CM DIA0.035IN TIP L3MM STD EXCHG PTFE J

## (undated) DEVICE — 3M™ TEGADERM™ TRANSPARENT FILM DRESSING FRAME STYLE, 1626W, 4 IN X 4-3/4 IN (10 CM X 12 CM), 50/CT 4CT/CASE: Brand: 3M™ TEGADERM™

## (undated) DEVICE — SOLIDIFIER FLUID 3000 CC ABSORB

## (undated) DEVICE — KIT HND CTRL 3 W STPCOCK ROT END 54IN PREM HI PRSS TBNG AT

## (undated) DEVICE — KENDALL RADIOLUCENT FOAM MONITORING ELECTRODE -RECTANGULAR SHAPE: Brand: KENDALL

## (undated) DEVICE — 1200 GUARD II KIT W/5MM TUBE W/O VAC TUBE: Brand: GUARDIAN

## (undated) DEVICE — ORISE PROKNIFE 1.5 MM ELECTRODE: Brand: ORISE™ PROKNIFE